# Patient Record
Sex: MALE | Race: WHITE | NOT HISPANIC OR LATINO | Employment: OTHER | URBAN - METROPOLITAN AREA
[De-identification: names, ages, dates, MRNs, and addresses within clinical notes are randomized per-mention and may not be internally consistent; named-entity substitution may affect disease eponyms.]

---

## 2017-06-15 ENCOUNTER — TRANSFERRED RECORDS (OUTPATIENT)
Dept: FAMILY MEDICINE | Facility: CLINIC | Age: 67
End: 2017-06-15

## 2017-06-21 ENCOUNTER — TRANSFERRED RECORDS (OUTPATIENT)
Dept: FAMILY MEDICINE | Facility: CLINIC | Age: 67
End: 2017-06-21

## 2017-06-22 ENCOUNTER — OFFICE VISIT (OUTPATIENT)
Dept: FAMILY MEDICINE | Facility: CLINIC | Age: 67
End: 2017-06-22

## 2017-06-22 VITALS
OXYGEN SATURATION: 95 % | WEIGHT: 258.4 LBS | BODY MASS INDEX: 33.18 KG/M2 | DIASTOLIC BLOOD PRESSURE: 82 MMHG | SYSTOLIC BLOOD PRESSURE: 122 MMHG | HEART RATE: 55 BPM | TEMPERATURE: 97.6 F

## 2017-06-22 DIAGNOSIS — Z12.5 SCREENING FOR PROSTATE CANCER: ICD-10-CM

## 2017-06-22 DIAGNOSIS — M10.00 IDIOPATHIC GOUT, UNSPECIFIED CHRONICITY, UNSPECIFIED SITE: ICD-10-CM

## 2017-06-22 DIAGNOSIS — E78.2 MIXED HYPERLIPIDEMIA: ICD-10-CM

## 2017-06-22 DIAGNOSIS — K21.9 GASTROESOPHAGEAL REFLUX DISEASE WITHOUT ESOPHAGITIS: ICD-10-CM

## 2017-06-22 DIAGNOSIS — G60.9 IDIOPATHIC PERIPHERAL NEUROPATHY: ICD-10-CM

## 2017-06-22 DIAGNOSIS — E03.9 ACQUIRED HYPOTHYROIDISM: ICD-10-CM

## 2017-06-22 DIAGNOSIS — N52.9 IMPOTENCE OF ORGANIC ORIGIN: ICD-10-CM

## 2017-06-22 DIAGNOSIS — J30.9 CHRONIC ALLERGIC RHINITIS, UNSPECIFIED SEASONALITY, UNSPECIFIED TRIGGER: ICD-10-CM

## 2017-06-22 DIAGNOSIS — R73.09 ABNORMAL GLUCOSE: ICD-10-CM

## 2017-06-22 PROCEDURE — 36415 COLL VENOUS BLD VENIPUNCTURE: CPT | Performed by: FAMILY MEDICINE

## 2017-06-22 PROCEDURE — 80061 LIPID PANEL: CPT | Mod: 90 | Performed by: FAMILY MEDICINE

## 2017-06-22 PROCEDURE — 82607 VITAMIN B-12: CPT | Mod: 90 | Performed by: FAMILY MEDICINE

## 2017-06-22 PROCEDURE — 82306 VITAMIN D 25 HYDROXY: CPT | Mod: 90 | Performed by: FAMILY MEDICINE

## 2017-06-22 PROCEDURE — 84439 ASSAY OF FREE THYROXINE: CPT | Mod: 90 | Performed by: FAMILY MEDICINE

## 2017-06-22 PROCEDURE — 84550 ASSAY OF BLOOD/URIC ACID: CPT | Mod: 90 | Performed by: FAMILY MEDICINE

## 2017-06-22 PROCEDURE — 99214 OFFICE O/P EST MOD 30 MIN: CPT | Performed by: FAMILY MEDICINE

## 2017-06-22 PROCEDURE — 80048 BASIC METABOLIC PNL TOTAL CA: CPT | Mod: 90 | Performed by: FAMILY MEDICINE

## 2017-06-22 PROCEDURE — 84443 ASSAY THYROID STIM HORMONE: CPT | Mod: 90 | Performed by: FAMILY MEDICINE

## 2017-06-22 PROCEDURE — 84153 ASSAY OF PSA TOTAL: CPT | Mod: 90 | Performed by: FAMILY MEDICINE

## 2017-06-22 RX ORDER — LEVOTHYROXINE SODIUM 88 UG/1
88 TABLET ORAL DAILY
Qty: 90 TABLET | Refills: 3 | Status: CANCELLED | OUTPATIENT
Start: 2017-06-22

## 2017-06-22 NOTE — PATIENT INSTRUCTIONS
Colonoscopy:      [facility] (Dr Villavicencio, Dr Allen)    [facility]

## 2017-06-22 NOTE — MR AVS SNAPSHOT
After Visit Summary   6/22/2017    Rojelio Mosqueda    MRN: [id number]           Patient Information     Date Of Birth          1950        Visit Information        Provider Department      6/22/2017 8:30 AM Viviane Ann MD [facility]        Today's Diagnoses     Idiopathic peripheral neuropathy        Gastroesophageal reflux disease without esophagitis        Acquired hypothyroidism        Idiopathic gout, unspecified chronicity, unspecified site        Chronic allergic rhinitis, unspecified seasonality, unspecified trigger        Impotence of organic origin        Mixed hyperlipidemia        Screening for prostate cancer        Abnormal glucose          Care Instructions    Colonoscopy:      [facility] (Dr Villavicencio, Dr Allen)    [facility]          Follow-ups after your visit        Who to contact     If you have questions or need follow up information about today's clinic visit or your schedule please contact [facility] directly at [phone].  Normal or non-critical lab and imaging results will be communicated to you by [employer], letter or phone within 4 business days after the clinic has received the results. If you do not hear from us within 7 days, please contact the clinic through [employer] or phone. If you have a critical or abnormal lab result, we will notify you by phone as soon as possible.  Submit refill requests through [employer] or call your pharmacy and they will forward the refill request to us. Please allow 3 business days for your refill to be completed.          Additional Information About Your Visit        MyChart Information     [employer] gives you secure access to your electronic health record. If you see a primary care provider, you can also send messages to your care team and make appointments. If you have questions, please call your primary care clinic.  If you do not have a primary care  provider, please call [phone] and they will assist you.        Care EveryWhere ID     This is your Care EveryWhere ID. This could be used by other organizations to access your [facility] medical records  [id number]        Your Vitals Were     Pulse Temperature Pulse Oximetry BMI (Body Mass Index)          55 97.6  F (36.4  C) (Oral) 95% 33.18 kg/m2         Blood Pressure from Last 3 Encounters:   06/22/17 122/82   09/09/16 128/82   07/06/16 112/74    Weight from Last 3 Encounters:   06/22/17 117.2 kg (258 lb 6.4 oz)   09/09/16 116.7 kg (257 lb 3.2 oz)   07/06/16 116.9 kg (257 lb 12.8 oz)              We Performed the Following     BASIC METABOLIC PANEL ([facility])     HCL PSA, SCREENING ([facility])     Lipid Profile     T4 free ([facility])     TSH ([facility])     URIC ACID ([facility])     VENOUS COLLECTION     VITAMIN B12 ([facility])     Vitamin D Deficiency          Today's Medication Changes          These changes are accurate as of: 6/22/17 11:59 PM.  If you have any questions, ask your nurse or doctor.               These medicines have changed or have updated prescriptions.        Dose/Directions    * levothyroxine 88 MCG tablet   Commonly known as:  SYNTHROID/LEVOTHROID   This may have changed:  Another medication with the same name was added. Make sure you understand how and when to take each.   Used for:  Acquired hypothyroidism   Changed by:  Viviane Ann MD        Dose:  88 mcg   Take 1 tablet (88 mcg) by mouth daily   Quantity:  90 tablet   Refills:  3       * levothyroxine 100 MCG tablet   Commonly known as:  SYNTHROID/LEVOTHROID   This may have changed:  You were already taking a medication with the same name, and this prescription was added. Make sure you understand how and when to take each.   Used for:  Acquired hypothyroidism   Changed by:  Viviane Ann MD        Dose:  100 mcg   Take 1 tablet (100 mcg) by mouth daily   Quantity:  90 tablet   Refills:  1       * Notice:  This list has 2 medication(s)  that are the same as other medications prescribed for you. Read the directions carefully, and ask your doctor or other care provider to review them with you.         Where to get your medicines      These medications were sent to [facility]/pharmacy #[id number] - [place], [place] - [address]  [address], [facility] [address]     Phone:  [phone]     allopurinol 100 MG tablet    levothyroxine 100 MCG tablet    mometasone 50 MCG/ACT spray    omeprazole 20 MG CR capsule    tadalafil 20 MG tablet         Some of these will need a paper prescription and others can be bought over the counter.  Ask your nurse if you have questions.     Bring a paper prescription for each of these medications     pregabalin 75 MG capsule                Primary Care Provider Office Phone # Fax #    Viviane Ann [phone]       [facility] [address]   [facility] [address]        Equal Access to Services     VASHTI PRAKASH : Hadii emily bhandari hadasho Soomaali, waaxda luqadaha, qaybta kaalmada adeegyada, waxay olesyain alberto ramos . So [facility] [phone].    ATENCIÓN: Si habla español, tiene a merida disposición servicios gratuitos de asistencia lingüística. Erika al [phone].    We comply with applicable federal civil rights laws and [place] laws. We do not discriminate on the basis of race, color, national origin, age, disability sex, sexual orientation or gender identity.            Thank you!     Thank you for choosing [facility]  for your care. Our goal is always to provide you with excellent care. Hearing back from our patients is one way we can continue to improve our services. Please take a few minutes to complete the written survey that you may receive in the mail after your visit with us. Thank you!             Your Updated Medication List - Protect others around you: Learn how to safely use, store and throw away your medicines at  www.disposemymeds.org.          This list is accurate as of: 6/22/17 11:59 PM.  Always use your most recent med list.                   Brand Name Dispense Instructions for use Diagnosis    allopurinol 100 MG tablet    ZYLOPRIM    90 tablet    Take 3 tablets (300 mg) by mouth daily    Idiopathic gout, unspecified chronicity, unspecified site       cholecalciferol 43257 UNITS capsule    VITAMIN D3    12 capsule    Take 1 capsule (50,000 Units) by mouth once a week    Vitamin D deficiency       DAILY MULTIVITAMIN PO      1 daily        GLUCOSAMINE CHONDROITIN COMPLX PO      1 daily        * levothyroxine 88 MCG tablet    SYNTHROID/LEVOTHROID    90 tablet    Take 1 tablet (88 mcg) by mouth daily    Acquired hypothyroidism       * levothyroxine 100 MCG tablet    SYNTHROID/LEVOTHROID    90 tablet    Take 1 tablet (100 mcg) by mouth daily    Acquired hypothyroidism       * LYRICA PO      Take 75 mg by mouth daily    Idiopathic peripheral neuropathy       * pregabalin 75 MG capsule    LYRICA    270 capsule    Take 1 capsule (75 mg) by mouth 3 times daily    Idiopathic peripheral neuropathy       MAGNESIUM OXIDE PO       Malaise       mometasone 50 MCG/ACT spray    NASONEX    3 Box    Spray 2 sprays into both nostrils daily    Chronic allergic rhinitis, unspecified seasonality, unspecified trigger       OMEGA 3 PO      1 daily        omeprazole 20 MG CR capsule    priLOSEC    90 capsule    Take 1 capsule (20 mg) by mouth daily    Gastroesophageal reflux disease without esophagitis       tadalafil 20 MG tablet    CIALIS    20 tablet    Take 1 tablet (20 mg) by mouth daily as needed    Impotence of organic origin       VITAMIN B 12 PO       Malaise       VITAMIN B-1 PO       Malaise       * Notice:  This list has 4 medication(s) that are the same as other medications prescribed for you. Read the directions carefully, and ask your doctor or other care provider to review them with you.

## 2017-06-22 NOTE — PROGRESS NOTES
"  SUBJECTIVE:                                                    Rojelio Mosqueda is a 66 year old male who presents to clinic today for the following health issues:    Hypothyroidism Follow-up      Since last visit, patient describes the following symptoms: Weight stable, no hair loss, no skin changes, no constipation, no loose stools   Complains of sweating- better than last year- ongoing problem    Amount of exercise or physical activity: regular activity- hiking, biking    Problems taking medications regularly: No    Medication side effects: none      PROBLEMS TO ADD ON...    Infected olecranon bursa this winter- required drainage- symptoms resolved    Gout: refill allopurinol  No gout flairs this past year.    Idiopathic neuropathy: refill lyrica- manages- symptoms  Recheck vitamin B12  Sees a podiatrist that does  neurogenics- electrodes on legs- - has done two sessions  Night symptoms are worse / feels like a cramping in the toes  Discussed follow up visit with his neurologist    GERD: takes omeprazole 20 mg daily (has been on for years)  No heartburn symptoms- feels \"flem\" in his throat- hiatal hernia- long term treatment advised by GI    taking 10,000 IU vitamin D today- excessive dose: cautioned about fat storage-     Chronic rhinitis- symptoms controlled with nasal steroids    Requests refill of cialis      Problem list and histories reviewed & adjusted, as indicated.  Additional history: as documented    Patient Active Problem List   Diagnosis     ESOPHAGEAL REFLUX/ hiatal hernia     Family history of diabetes mellitus     Depressive disorder, not elsewhere classified     Impotence of organic origin     Neuropathy (H)     Peripheral neuropathy (H)     Elevated blood uric acid level     Hypothyroidism     Health Care Home     ACP (advance care planning)     Past Surgical History:   Procedure Laterality Date     C NONSPECIFIC PROCEDURE  2/2011    upper endos/ mod hiatal hernia/ antireflux tx     " COLONOSCOPY  2007    [place] [facility]     HERNIA REPAIR       lt. foot-surgery       NO HISTORY OF SURGERY       ORTHOPEDIC SURGERY         Social History   Substance Use Topics     Smoking status: Former Smoker     Packs/day: 1.00     Years: 15.00     Types: Cigarettes     Quit date: 1/1/2004     Smokeless tobacco: Never Used      Comment: Smoked off & on     Alcohol use 1.5 oz/week     3 drink(s) per week      Comment: 2-3 days per week 1-2 drinks per time     Family History   Problem Relation Age of Onset     CANCER No family hx of      DIABETES Mother      HEART DISEASE No family hx of          Current Outpatient Prescriptions   Medication Sig Dispense Refill     cholecalciferol (VITAMIN D3) 84103 UNITS capsule Take 1 capsule (50,000 Units) by mouth once a week 12 capsule 0     Cyanocobalamin (VITAMIN B 12 PO)        Thiamine HCl (VITAMIN B-1 PO)        MAGNESIUM OXIDE PO        pregabalin (LYRICA) 75 MG capsule Take 1 capsule (75 mg) by mouth 3 times daily 270 capsule 3     omeprazole (PRILOSEC) 20 MG capsule Take 1 capsule (20 mg) by mouth daily 90 capsule 3     levothyroxine (SYNTHROID, LEVOTHROID) 88 MCG tablet Take 1 tablet (88 mcg) by mouth daily 90 tablet 3     allopurinol (ZYLOPRIM) 100 MG tablet Take 3 tablets (300 mg) by mouth daily 90 tablet 3     mometasone (NASONEX) 50 MCG/ACT nasal spray Spray 2 sprays into both nostrils daily 3 Box 3     tadalafil (CIALIS) 20 MG tablet Take 1 tablet (20 mg) by mouth daily as needed 20 tablet prn     Pregabalin (LYRICA PO) Take 75 mg by mouth daily       GLUCOSAMINE CHONDROITIN COMPLX PO 1 daily       DAILY MULTIVITAMIN OR 1 daily       OMEGA 3 OR 1 daily         Reviewed and updated as needed this visit by clinical staff       Reviewed and updated as needed this visit by Provider         ROS:  Constitutional, HEENT, cardiovascular, pulmonary, gi and gu systems are negative, except as otherwise noted.    OBJECTIVE:     /82 (BP Location: Left arm, Patient  "Position: Chair, Cuff Size: Adult Large)  Pulse 55  Temp 97.6  F (36.4  C) (Oral)  Wt 117.2 kg (258 lb 6.4 oz)  SpO2 95%  BMI 33.18 kg/m2  Body mass index is 33.18 kg/(m^2).   Regular rate and  rhythm. S1 and S2 normal, no murmurs, clicks, gallops or rubs. No edema or JVD. Chest is clear; no wheezes or rales.  No redness, warmth of joints- right olecranon is normal.  Thyroid without palpable masses or enlargement    Diagnostic Test Results:per epic       ASSESSMENT/PLAN:     Problem List Items Addressed This Visit     ESOPHAGEAL REFLUX/ hiatal hernia    Hypothyroidism    Relevant Orders    TSH ([facility]) (Completed)    T4 free ([facility]) (Completed)    VENOUS COLLECTION (Completed)    Impotence of organic origin    Peripheral neuropathy (H)    Relevant Orders    VITAMIN B12 ([facility]) (Completed)    VENOUS COLLECTION (Completed)    Vitamin D Deficiency (Completed)      Other Visit Diagnoses     Screening for prostate cancer    -  Primary    Relevant Orders    HCL PSA, SCREENING ([facility]) (Completed)    Idiopathic gout, unspecified chronicity, unspecified site        Relevant Orders    URIC ACID ([facility]) (Completed)    VENOUS COLLECTION (Completed)    BASIC METABOLIC PANEL ([facility]) (Completed)    Allergic rhinitis due to pollen, unspecified rhinitis seasonality        Mixed hyperlipidemia        Relevant Orders    Lipid Profile (Completed)           BMI:   Estimated body mass index is 33.18 kg/(m^2) as calculated from the following:    Height as of 9/9/16: 1.88 m (6' 2\").    Weight as of this encounter: 117.2 kg (258 lb 6.4 oz).   Weight management plan: Discussed healthy diet and exercise guidelines and patient will follow up in 3 months in clinic to re-evaluate.      MEDICATIONS:        - Increase Levothyroxine to 100 mcg       - Decrease Vitamin D to 1000 IU       - Continue other medications without change  CONSULTATION/REFERRAL to neurology  FUTURE APPOINTMENTS:       - Follow-up visit in 3 months- recheck thyroid and " lipid profile  Work on weight loss  Regular exercise    Viviane Ann MD  [facility]

## 2017-06-22 NOTE — NURSING NOTE
Rojelio is here for a fasting medication recheck.     Pre-Visit Screening :  Immunizations : up to date  Colon Screening : Postponed- Pt is not sure his insurance pays for this- He will be calling them.   Asthma Action Test/Plan : DELFINO  PHQ9/GAD7 :  DELFINO    Pulse - regular  My Chart - accepts    CLASSIFICATION OF OVERWEIGHT AND OBESITY BY BMI                         Obesity Class           BMI(kg/m2)  Underweight                                    < 18.5  Normal                                         18.5-24.9  Overweight                                     25.0-29.9  OBESITY                     I                  30.0-34.9                              II                 35.0-39.9  EXTREME OBESITY             III                >40                             Patient's  BMI Body mass index is 33.18 kg/(m^2).  http://hin.nhlbi.nih.gov/menuplanner/menu.cgi  Questioned patient about current smoking habits.  Pt. Quit smoking sometime ago.     Tomeka.YVAN Palacios ([facility])

## 2017-06-23 LAB
ABBOTT PSA - QUEST: 0.9 NG/ML
BUN SERPL-MCNC: 20 MG/DL (ref 7–25)
BUN/CREATININE RATIO: ABNORMAL (CALC) (ref 6–22)
CALCIUM SERPL-MCNC: 9.9 MG/DL (ref 8.6–10.3)
CHLORIDE SERPLBLD-SCNC: 103 MMOL/L (ref 98–110)
CHOLEST SERPL-MCNC: 210 MG/DL (ref 125–200)
CHOLEST/HDLC SERPL: 6.2 (CALC)
CO2 SERPL-SCNC: 28 MMOL/L (ref 20–31)
CREAT SERPL-MCNC: 0.98 MG/DL (ref 0.7–1.25)
EGFR AFRICAN AMERICAN - QUEST: 94 ML/MIN/1.73M2
GFR SERPL CREATININE-BSD FRML MDRD: 81 ML/MIN/1.73M2
GLUCOSE - QUEST: 103 MG/DL (ref 65–99)
HDLC SERPL-MCNC: 34 MG/DL
LDLC SERPL CALC-MCNC: 134 MG/DL (CALC)
NONHDLC SERPL-MCNC: 176 MG/DL (CALC)
POTASSIUM SERPL-SCNC: 4.9 MMOL/L (ref 3.5–5.3)
SODIUM SERPL-SCNC: 137 MMOL/L (ref 135–146)
T4, FREE, NON-DIALYSIS - QUEST: 1 NG/DL (ref 0.8–1.8)
TRIGL SERPL-MCNC: 211 MG/DL
TSH SERPL-ACNC: 4.14 MIU/L (ref 0.4–4.5)
URATE SERPL-MCNC: 6.8 MG/DL (ref 4–8)
VIT B12 SERPL-MCNC: 608 PG/ML (ref 200–1100)
VITAMIN D, 25-OH, TOTAL - QUEST: 50 NG/ML (ref 30–100)

## 2017-06-26 PROBLEM — R73.09 ABNORMAL GLUCOSE: Status: ACTIVE | Noted: 2017-06-26

## 2017-06-26 RX ORDER — TADALAFIL 20 MG/1
20 TABLET ORAL DAILY PRN
Qty: 20 TABLET | Status: SHIPPED | OUTPATIENT
Start: 2017-06-26 | End: 2020-08-17

## 2017-06-26 RX ORDER — ALLOPURINOL 100 MG/1
300 TABLET ORAL DAILY
Qty: 90 TABLET | Refills: 3 | Status: SHIPPED | OUTPATIENT
Start: 2017-06-26 | End: 2018-07-05

## 2017-06-26 RX ORDER — PREGABALIN 75 MG/1
75 CAPSULE ORAL 3 TIMES DAILY
Qty: 270 CAPSULE | Refills: 3 | Status: SHIPPED | OUTPATIENT
Start: 2017-06-26 | End: 2017-10-18

## 2017-06-26 RX ORDER — LEVOTHYROXINE SODIUM 100 UG/1
100 TABLET ORAL DAILY
Qty: 90 TABLET | Refills: 1 | Status: SHIPPED | OUTPATIENT
Start: 2017-06-26 | End: 2017-10-18

## 2017-06-26 RX ORDER — MOMETASONE FUROATE MONOHYDRATE 50 UG/1
2 SPRAY, METERED NASAL DAILY
Qty: 3 BOX | Refills: 3 | Status: SHIPPED | OUTPATIENT
Start: 2017-06-26 | End: 2020-08-17

## 2017-07-17 ENCOUNTER — TRANSFERRED RECORDS (OUTPATIENT)
Dept: FAMILY MEDICINE | Facility: CLINIC | Age: 67
End: 2017-07-17

## 2017-09-07 ENCOUNTER — MYC MEDICAL ADVICE (OUTPATIENT)
Dept: FAMILY MEDICINE | Facility: CLINIC | Age: 67
End: 2017-09-07

## 2017-09-07 NOTE — TELEPHONE ENCOUNTER
From: Rojelio Mosqueda  To: Viviane Ann MD  Sent: 9/7/2017 9:38 AM CDT  Subject: Do I need an appointment?    Doctor Marissa,  I have been having pain in my elbow area now for 6 months, I guess this is called tennis elbow.  I have been using some ice. And use an elbow band in the area when I do any activity.  Is there anything else I can do? Is any type of lazier useful.  Thanks

## 2017-09-08 ENCOUNTER — MYC MEDICAL ADVICE (OUTPATIENT)
Dept: FAMILY MEDICINE | Facility: CLINIC | Age: 67
End: 2017-09-08

## 2017-09-08 NOTE — TELEPHONE ENCOUNTER
From: Rojelio Mosqueda  To: Viviane Ann MD  Sent: 9/8/2017 7:21 AM CDT  Subject: Do I need an appointment?    Doctor Marissa,  I did speak to my ortho doctor when I was getting my knee injections last June. He indicated he did not  Like to give the injections in the arm because of possible problems, I can't remember exactly what they were.  I was wondering if some kind of lazer therapy helps.  I tried to reply to your last message but could not so I started this one  Thanks for the response.  Miguel

## 2017-10-08 ENCOUNTER — MYC MEDICAL ADVICE (OUTPATIENT)
Dept: FAMILY MEDICINE | Facility: CLINIC | Age: 67
End: 2017-10-08

## 2017-10-08 DIAGNOSIS — G60.9 IDIOPATHIC PERIPHERAL NEUROPATHY: ICD-10-CM

## 2017-10-09 RX ORDER — PREGABALIN 100 MG/1
100 CAPSULE ORAL 3 TIMES DAILY
Qty: 270 CAPSULE | Refills: 2 | Status: SHIPPED | OUTPATIENT
Start: 2017-10-09 | End: 2018-06-19

## 2017-10-09 RX ORDER — PREGABALIN 75 MG/1
75 CAPSULE ORAL 3 TIMES DAILY
Qty: 270 CAPSULE | Status: CANCELLED | OUTPATIENT
Start: 2017-10-09

## 2017-10-09 NOTE — TELEPHONE ENCOUNTER
From: Rojelio Mosqueda  To: Viviane Ann MD  Sent: 10/8/2017 7:34 PM CDT  Subject: Question about medications    Doctor Marissa,  I have been needing at least four tablets of the Lyrica instead of the three that are now in my prescription for my neourophathy. I am not about 13 days short. I can not fill it unless the amount is changed. Could you please call into [facility] in [place] a refill with the four pills a day.  Thanks

## 2017-10-18 ENCOUNTER — OFFICE VISIT (OUTPATIENT)
Dept: FAMILY MEDICINE | Facility: CLINIC | Age: 67
End: 2017-10-18

## 2017-10-18 VITALS
TEMPERATURE: 97.9 F | OXYGEN SATURATION: 96 % | HEART RATE: 57 BPM | BODY MASS INDEX: 33.56 KG/M2 | SYSTOLIC BLOOD PRESSURE: 104 MMHG | DIASTOLIC BLOOD PRESSURE: 70 MMHG | WEIGHT: 261.4 LBS

## 2017-10-18 DIAGNOSIS — N52.9 IMPOTENCE OF ORGANIC ORIGIN: ICD-10-CM

## 2017-10-18 DIAGNOSIS — E79.0 ELEVATED BLOOD URIC ACID LEVEL: ICD-10-CM

## 2017-10-18 DIAGNOSIS — E03.9 ACQUIRED HYPOTHYROIDISM: Primary | ICD-10-CM

## 2017-10-18 PROCEDURE — 36415 COLL VENOUS BLD VENIPUNCTURE: CPT | Performed by: FAMILY MEDICINE

## 2017-10-18 PROCEDURE — 99213 OFFICE O/P EST LOW 20 MIN: CPT | Performed by: FAMILY MEDICINE

## 2017-10-18 PROCEDURE — 84550 ASSAY OF BLOOD/URIC ACID: CPT | Mod: 90 | Performed by: FAMILY MEDICINE

## 2017-10-18 PROCEDURE — 84443 ASSAY THYROID STIM HORMONE: CPT | Mod: 90 | Performed by: FAMILY MEDICINE

## 2017-10-18 PROCEDURE — 84439 ASSAY OF FREE THYROXINE: CPT | Mod: 90 | Performed by: FAMILY MEDICINE

## 2017-10-18 RX ORDER — TADALAFIL 20 MG/1
20 TABLET ORAL DAILY PRN
Qty: 20 TABLET | Status: CANCELLED | OUTPATIENT
Start: 2017-10-18

## 2017-10-18 NOTE — PROGRESS NOTES
SUBJECTIVE:  Rojelio is a 66 year old male here for thyroid check and refills.    Dose was increased to 100 mcg three months ago for TSH above 4- repeat labs.        He denies symptoms such as:  weight changes, appetite changes, sleep alterations, cold/hot intolerances, vision changes, heart symptoms, mental changes, or swelling in legs.  Driving to [place] in about two weeks    Past medical history, family history, medications and social history reviewed today and updated in EPIC.      Exam:    /70 (BP Location: Right arm, Patient Position: Chair, Cuff Size: Adult Large)  Pulse 57  Temp 97.9  F (36.6  C) (Oral)  Wt 118.6 kg (261 lb 6.4 oz)  SpO2 96%  BMI 33.56 kg/m2  General appearance: healthy, alert and no distress  The neck is supple and free of adenopathy or masses, the   thyroid is normal without enlargement or nodules.    Assessment: monitor thyroid function  Discussion of elevated fasting blood sugar- importance of diet and exercise  Taking allopurinol more consistently, wishes recheck of uric acid on 200 mg daily dose    PLAN:  Lab is pending.   Continue current medications if still in good range, and recheck levels in one year.     Takes thyroid with all of his vitamins in the morning- discussion of dosng on empty stomach without other vitamins       More than 50% of visit spent in counseling.

## 2017-10-18 NOTE — NURSING NOTE
Rojelio is here for a fasting medication recheck.     Pre-Visit Screening :  Immunizations : up to date    Colonoscopy : is up to date  Mammogram : NA  Asthma Action Test/Plan : NA  PHQ9/GAD7 :  Pt states he does not have any    Pulse - regular  My Chart - accepts    CLASSIFICATION OF OVERWEIGHT AND OBESITY BY BMI                         Obesity Class           BMI(kg/m2)  Underweight                                    < 18.5  Normal                                         18.5-24.9  Overweight                                     25.0-29.9  OBESITY                     I                  30.0-34.9                              II                 35.0-39.9  EXTREME OBESITY             III                >40                             Patient's  BMI Body mass index is 33.56 kg/(m^2).  http://hin.nhlbi.nih.gov/menuplanner/menu.cgi  Questioned patient about current smoking habits.  Pt.quit sometime ago    Tomeka.YVAN Palacios ([facility])

## 2017-10-18 NOTE — MR AVS SNAPSHOT
After Visit Summary   10/18/2017    Rojelio Mosqueda    MRN: [id number]           Patient Information     Date Of Birth          1950        Visit Information        Provider Department      10/18/2017 7:30 AM Viviane Ann MD [facility]        Today's Diagnoses     Acquired hypothyroidism    -  1    Impotence of organic origin        Elevated blood uric acid level           Follow-ups after your visit        Who to contact     If you have questions or need follow up information about today's clinic visit or your schedule please contact [facility] directly at [phone].  Normal or non-critical lab and imaging results will be communicated to you by Motion Dispatchhart, letter or phone within 4 business days after the clinic has received the results. If you do not hear from us within 7 days, please contact the clinic through Motion Dispatchhart or phone. If you have a critical or abnormal lab result, we will notify you by phone as soon as possible.  Submit refill requests through Smartio or call your pharmacy and they will forward the refill request to us. Please allow 3 business days for your refill to be completed.          Additional Information About Your Visit        MyChart Information     Smartio gives you secure access to your electronic health record. If you see a primary care provider, you can also send messages to your care team and make appointments. If you have questions, please call your primary care clinic.  If you do not have a primary care provider, please call [phone] and they will assist you.        Care EveryWhere ID     This is your Care EveryWhere ID. This could be used by other organizations to access your [facility] medical records  [id number]        Your Vitals Were     Pulse Temperature Pulse Oximetry BMI (Body Mass Index)          57 97.9  F (36.6  C) (Oral) 96% 33.56 kg/m2         Blood Pressure from Last 3 Encounters:   10/18/17  104/70   06/22/17 122/82   09/09/16 128/82    Weight from Last 3 Encounters:   10/18/17 118.6 kg (261 lb 6.4 oz)   06/22/17 117.2 kg (258 lb 6.4 oz)   09/09/16 116.7 kg (257 lb 3.2 oz)              We Performed the Following     T4 free     TSH (QUEST)     URIC ACID (QUEST)     VENOUS COLLECTION          Today's Medication Changes          These changes are accurate as of: 10/18/17 11:59 PM.  If you have any questions, ask your nurse or doctor.               These medicines have changed or have updated prescriptions.        Dose/Directions    levothyroxine 100 MCG tablet   Commonly known as:  SYNTHROID/LEVOTHROID   This may have changed:  Another medication with the same name was removed. Continue taking this medication, and follow the directions you see here.   Used for:  Acquired hypothyroidism   Changed by:  Viviane Ann MD        Dose:  100 mcg   Take 1 tablet (100 mcg) by mouth daily   Quantity:  90 tablet   Refills:  1            Where to get your medicines      These medications were sent to [facility]/pharmacy #[id number] - [place], [place] - [address]  [address] [facility] [address]     Phone:  [phone]     levothyroxine 100 MCG tablet                Primary Care Provider Office Phone # Fax #    Viviane Ann -[phone] [address] [place] [address]        Equal Access to Services     [facility]: Hadii emily bhandari hadasho Sofarhana, waaxda luqadaha, qaybta kaalmada adeashutoshyada, lyly ramos . So [facility] [phone].    ATENCIÓN: Si habla español, tiene a merida disposición servicios gratuitos de asistencia lingüística. Llame al [phone].    We comply with applicable federal civil rights laws and [place] laws. We do not discriminate on the basis of race, color, national origin, age, disability, sex, sexual orientation, or gender identity.            Thank you!     Thank you for choosing [facility]   for your care. Our goal is always to provide you with excellent care. Hearing back from our patients is one way we can continue to improve our services. Please take a few minutes to complete the written survey that you may receive in the mail after your visit with us. Thank you!             Your Updated Medication List - Protect others around you: Learn how to safely use, store and throw away your medicines at www.disposemymeds.org.          This list is accurate as of: 10/18/17 11:59 PM.  Always use your most recent med list.                   Brand Name Dispense Instructions for use Diagnosis    allopurinol 100 MG tablet    ZYLOPRIM    90 tablet    Take 3 tablets (300 mg) by mouth daily    Idiopathic gout, unspecified chronicity, unspecified site       cholecalciferol 52072 UNITS capsule    VITAMIN D3    12 capsule    Take 1 capsule (50,000 Units) by mouth once a week    Vitamin D deficiency       DAILY MULTIVITAMIN PO      1 daily        levothyroxine 100 MCG tablet    SYNTHROID/LEVOTHROID    90 tablet    Take 1 tablet (100 mcg) by mouth daily    Acquired hypothyroidism       MAGNESIUM OXIDE PO       Malaise       mometasone 50 MCG/ACT spray    NASONEX    3 Box    Spray 2 sprays into both nostrils daily    Chronic allergic rhinitis, unspecified seasonality, unspecified trigger       omeprazole 20 MG CR capsule    priLOSEC    90 capsule    Take 1 capsule (20 mg) by mouth daily    Gastroesophageal reflux disease without esophagitis       pregabalin 100 MG capsule    LYRICA    270 capsule    Take 1 capsule (100 mg) by mouth 3 times daily    Idiopathic peripheral neuropathy       tadalafil 20 MG tablet    CIALIS    20 tablet    Take 1 tablet (20 mg) by mouth daily as needed    Impotence of organic origin       VITAMIN B 12 PO       Malaise       VITAMIN B-1 PO       Malaise

## 2017-10-19 LAB
T4, FREE, NON-DIALYSIS - QUEST: 1 NG/DL (ref 0.8–1.8)
TSH SERPL-ACNC: 3.97 MIU/L (ref 0.4–4.5)
URATE SERPL-MCNC: 6.3 MG/DL (ref 4–8)

## 2017-10-20 RX ORDER — LEVOTHYROXINE SODIUM 100 UG/1
100 TABLET ORAL DAILY
Qty: 90 TABLET | Refills: 1 | Status: SHIPPED | OUTPATIENT
Start: 2017-10-20 | End: 2018-06-17

## 2018-01-03 ENCOUNTER — TRANSFERRED RECORDS (OUTPATIENT)
Dept: FAMILY MEDICINE | Facility: CLINIC | Age: 68
End: 2018-01-03

## 2018-06-17 DIAGNOSIS — E03.9 ACQUIRED HYPOTHYROIDISM: ICD-10-CM

## 2018-06-18 ENCOUNTER — MYC REFILL (OUTPATIENT)
Dept: FAMILY MEDICINE | Facility: CLINIC | Age: 68
End: 2018-06-18

## 2018-06-18 DIAGNOSIS — G60.9 IDIOPATHIC PERIPHERAL NEUROPATHY: ICD-10-CM

## 2018-06-18 RX ORDER — LEVOTHYROXINE SODIUM 100 UG/1
TABLET ORAL
Qty: 90 TABLET | Refills: 1 | Status: SHIPPED | OUTPATIENT
Start: 2018-06-18 | End: 2018-11-12

## 2018-06-18 RX ORDER — PREGABALIN 100 MG/1
100 CAPSULE ORAL 3 TIMES DAILY
Qty: 270 CAPSULE | Refills: 2 | Status: CANCELLED | OUTPATIENT
Start: 2018-06-18

## 2018-06-18 NOTE — TELEPHONE ENCOUNTER
Rojelio Mosqueda is requesting a refill of:    Pending Prescriptions:                       Disp   Refills    levothyroxine (SYNTHROID/LEVOTHROID) 100 *90 tab*0            Sig: TAKE 1 TABLET (100 MCG) BY MOUTH DAILY    TSH   Date Value Ref Range Status   10/18/2017 3.97 0.40 - 4.50 mIU/L Final

## 2018-06-19 DIAGNOSIS — G60.9 IDIOPATHIC PERIPHERAL NEUROPATHY: ICD-10-CM

## 2018-06-19 RX ORDER — PREGABALIN 100 MG
CAPSULE ORAL
Qty: 270 CAPSULE | Refills: 0 | Status: ON HOLD | OUTPATIENT
Start: 2018-06-19 | End: 2018-10-08

## 2018-06-19 NOTE — TELEPHONE ENCOUNTER
Pending Prescriptions:                       Disp   Refills    LYRICA 100 MG capsule [Pharmacy Med Name:*270 ca*2            Sig: TAKE 1 CAPSULE BY MOUTH 3 TIMES DAILY    Per my chart notes, from yesterday   You wanted to see pt, he said he would make an appt  No future appt as of now  Fax or deny  Lisa  [phone] (home)

## 2018-06-22 ENCOUNTER — OFFICE VISIT (OUTPATIENT)
Dept: FAMILY MEDICINE | Facility: CLINIC | Age: 68
End: 2018-06-22

## 2018-06-22 VITALS
WEIGHT: 258 LBS | TEMPERATURE: 98.2 F | BODY MASS INDEX: 33.13 KG/M2 | HEART RATE: 56 BPM | SYSTOLIC BLOOD PRESSURE: 112 MMHG | OXYGEN SATURATION: 95 % | DIASTOLIC BLOOD PRESSURE: 78 MMHG

## 2018-06-22 DIAGNOSIS — Z79.899 MEDICATION MANAGEMENT: ICD-10-CM

## 2018-06-22 DIAGNOSIS — G62.9 NEUROPATHY: Primary | ICD-10-CM

## 2018-06-22 DIAGNOSIS — E78.2 MIXED HYPERLIPIDEMIA: ICD-10-CM

## 2018-06-22 PROCEDURE — 99213 OFFICE O/P EST LOW 20 MIN: CPT | Performed by: FAMILY MEDICINE

## 2018-06-22 PROCEDURE — 36415 COLL VENOUS BLD VENIPUNCTURE: CPT | Performed by: FAMILY MEDICINE

## 2018-06-22 RX ORDER — PREGABALIN 150 MG/1
300 CAPSULE ORAL 2 TIMES DAILY
Qty: 360 CAPSULE | Refills: 3 | Status: SHIPPED | OUTPATIENT
Start: 2018-06-22 | End: 2019-06-21

## 2018-06-22 NOTE — NURSING NOTE
Miguel is here for medication check today.    Pre-visit Screening:  Immunizations:  not up to date - due for pneumo  Colonoscopy:  is up to date  Mammogram: is up to date  Asthma Action Test/Plan:  No concerns  PHQ9:  NA  GAD7:  NA  Questioned patient about current smoking habits Pt. quit smoking some time ago.  Ok to leave detailed message on voice mail for today's visit only Yes, phone # [phone]

## 2018-06-22 NOTE — PATIENT INSTRUCTIONS
New Recombinant shingles vaccine (Shingrix): call your insurance for information on cost    Recheck thyroid and other labs in October

## 2018-06-22 NOTE — MR AVS SNAPSHOT
After Visit Summary   6/22/2018    Rojelio Mosqueda    MRN: [id number]           Patient Information     Date Of Birth          1950        Visit Information        Provider Department      6/22/2018 7:20 AM Viviane Ann MD [facility]        Today's Diagnoses     Neuropathy    -  1    Medication management        Mixed hyperlipidemia          Care Instructions    New Recombinant shingles vaccine (Shingrix): call your insurance for information on cost    Recheck thyroid and other labs in October            Follow-ups after your visit        Who to contact     If you have questions or need follow up information about today's clinic visit or your schedule please contact [facility] directly at [phone].  Normal or non-critical lab and imaging results will be communicated to you by 60mohart, letter or phone within 4 business days after the clinic has received the results. If you do not hear from us within 7 days, please contact the clinic through 60mohart or phone. If you have a critical or abnormal lab result, we will notify you by phone as soon as possible.  Submit refill requests through [employer] or call your pharmacy and they will forward the refill request to us. Please allow 3 business days for your refill to be completed.          Additional Information About Your Visit        MyChart Information     [employer] gives you secure access to your electronic health record. If you see a primary care provider, you can also send messages to your care team and make appointments. If you have questions, please call your primary care clinic.  If you do not have a primary care provider, please call [phone] and they will assist you.        Care EveryWhere ID     This is your Care EveryWhere ID. This could be used by other organizations to access your [facility] medical records  [id number]        Your Vitals Were     Pulse Temperature Pulse Oximetry  BMI (Body Mass Index)          56 98.2  F (36.8  C) (Oral) 95% 33.13 kg/m2         Blood Pressure from Last 3 Encounters:   06/22/18 112/78   10/18/17 104/70   06/22/17 122/82    Weight from Last 3 Encounters:   06/22/18 117 kg (258 lb)   10/18/17 118.6 kg (261 lb 6.4 oz)   06/22/17 117.2 kg (258 lb 6.4 oz)              We Performed the Following     BASIC METABOLIC PANEL (QUEST)     Lipid Profile     VENOUS COLLECTION     Vitamin D Deficiency          Today's Medication Changes          These changes are accurate as of 6/22/18  8:05 AM.  If you have any questions, ask your nurse or doctor.               These medicines have changed or have updated prescriptions.        Dose/Directions    * LYRICA 100 MG capsule   This may have changed:  Another medication with the same name was added. Make sure you understand how and when to take each.   Used for:  Idiopathic peripheral neuropathy   Generic drug:  pregabalin   Changed by:  Viviane Ann MD        TAKE 1 CAPSULE BY MOUTH 3 TIMES DAILY   Quantity:  270 capsule   Refills:  0       * pregabalin 150 MG capsule   Commonly known as:  LYRICA   This may have changed:  You were already taking a medication with the same name, and this prescription was added. Make sure you understand how and when to take each.   Used for:  Neuropathy   Changed by:  Viviane Ann MD        Dose:  300 mg   Take 2 capsules (300 mg) by mouth 2 times daily   Quantity:  360 capsule   Refills:  3       * Notice:  This list has 2 medication(s) that are the same as other medications prescribed for you. Read the directions carefully, and ask your doctor or other care provider to review them with you.         Where to get your medicines      Some of these will need a paper prescription and others can be bought over the counter.  Ask your nurse if you have questions.     Bring a paper prescription for each of these medications     pregabalin 150 MG capsule                Primary Care Provider  Office Phone # Fax #    Viviane Ann [phone] [address] [address]        Equal Access to Services     [facility] : Hadii emily bhandari hadlawrenceo Soomaali, waaxda luqadaha, qaybta kaalmada adesujit, lyly donnellypraful mejia. So [facility] [phone].    ATENCIÓN: Si habla español, tiene a merida disposición servicios gratuitos de asistencia lingüística. Llame al [phone].    We comply with applicable federal civil rights laws and [place] laws. We do not discriminate on the basis of race, color, national origin, age, disability, sex, sexual orientation, or gender identity.            Thank you!     Thank you for choosing [facility]  for your care. Our goal is always to provide you with excellent care. Hearing back from our patients is one way we can continue to improve our services. Please take a few minutes to complete the written survey that you may receive in the mail after your visit with us. Thank you!             Your Updated Medication List - Protect others around you: Learn how to safely use, store and throw away your medicines at www.disposemymeds.org.          This list is accurate as of 6/22/18  8:05 AM.  Always use your most recent med list.                   Brand Name Dispense Instructions for use Diagnosis    allopurinol 100 MG tablet    ZYLOPRIM    90 tablet    Take 3 tablets (300 mg) by mouth daily    Idiopathic gout, unspecified chronicity, unspecified site       cholecalciferol 73265 units capsule    VITAMIN D3    12 capsule    Take 1 capsule (50,000 Units) by mouth once a week    Vitamin D deficiency       DAILY MULTIVITAMIN PO      1 daily        levothyroxine 100 MCG tablet    SYNTHROID/LEVOTHROID    90 tablet    TAKE 1 TABLET (100 MCG) BY MOUTH DAILY    Acquired hypothyroidism       * LYRICA 100 MG capsule   Generic drug:  pregabalin     270 capsule    TAKE 1 CAPSULE BY MOUTH 3 TIMES DAILY     Idiopathic peripheral neuropathy       * pregabalin 150 MG capsule    LYRICA    360 capsule    Take 2 capsules (300 mg) by mouth 2 times daily    Neuropathy       MAGNESIUM OXIDE PO       Malaise       mometasone 50 MCG/ACT spray    NASONEX    3 Box    Spray 2 sprays into both nostrils daily    Chronic allergic rhinitis, unspecified seasonality, unspecified trigger       omeprazole 20 MG CR capsule    priLOSEC    90 capsule    Take 1 capsule (20 mg) by mouth daily    Gastroesophageal reflux disease without esophagitis       tadalafil 20 MG tablet    CIALIS    20 tablet    Take 1 tablet (20 mg) by mouth daily as needed    Impotence of organic origin       VITAMIN B 12 PO       Malaise       VITAMIN B-1 PO       Malaise       * Notice:  This list has 2 medication(s) that are the same as other medications prescribed for you. Read the directions carefully, and ask your doctor or other care provider to review them with you.

## 2018-06-22 NOTE — PROGRESS NOTES
"Refill lyrica    Side effects: sometimes feels fuzzy    Weight gain: no  Xerostomia: no  Dizziness: no  Peripheral edema: no    Having more pain particularly at night     Renal clearance- kidney function tests recommended    Biking and pickle ball in [place]  No longer able to hike because of knee pain (regular knee injections)    When working , took 300 mg twice a day  More pain when in shoes  More pain when puts feet up-  Now mostly- in sandals    No claudication symptoms    Other treatments  He has failed cold lazer on feet  He is interested in \"nerve block:    Medication: often takes 400 mg in a single dose  Requests 500 mg daily RX  Advised 300 mg twice a day SPLIT DOSE    Requests Vitamin D : takes 5000 or 10,000 IU daily    PSA in October  Thyroid labs in October  Uric acid in October     Other concerns:  Mixed hyperlipidemia  Good diet  Fasting today, wishes recheck  No exertional symptoms- active lifestyle    OBJECTIVE:  /78 (BP Location: Left arm, Patient Position: Sitting, Cuff Size: Adult Large)  Pulse 56  Temp 98.2  F (36.8  C) (Oral)  Wt 117 kg (258 lb)  SpO2 95%  BMI 33.13 kg/m2   Regular rate and  rhythm.  No edema or JVD. Chest is clear; no wheezes or rales.  Full peripheral pulses  Filament testing deferred- several evaluations previously for neuropathy    Assessment    (G62.9) Neuropathy  (primary encounter diagnosis)  Comment: talked about vitamin D - stored vitamin- can take too high of a dose  Plan: BASIC METABOLIC PANEL (QUEST), VENOUS         COLLECTION, pregabalin (LYRICA) 150 MG capsule,        Vitamin D Deficiency            (Z79.899) Medication management  Comment:   Plan: BASIC METABOLIC PANEL (QUEST), VENOUS         COLLECTION            (E78.2) Mixed hyperlipidemia  Comment:   Plan: Lipid Profile    More than 50% of visit spent in counseling.                  "

## 2018-06-23 LAB
BUN SERPL-MCNC: 21 MG/DL (ref 7–25)
BUN/CREATININE RATIO: NORMAL (CALC) (ref 6–22)
CALCIUM SERPL-MCNC: 9.2 MG/DL (ref 8.6–10.3)
CHLORIDE SERPLBLD-SCNC: 107 MMOL/L (ref 98–110)
CHOLEST SERPL-MCNC: 186 MG/DL
CHOLEST/HDLC SERPL: 6 (CALC)
CO2 SERPL-SCNC: 22 MMOL/L (ref 20–31)
CREAT SERPL-MCNC: 0.94 MG/DL (ref 0.7–1.25)
EGFR AFRICAN AMERICAN - QUEST: 98 ML/MIN/1.73M2
GFR SERPL CREATININE-BSD FRML MDRD: 85 ML/MIN/1.73M2
GLUCOSE - QUEST: 97 MG/DL (ref 65–99)
HDLC SERPL-MCNC: 31 MG/DL
LDLC SERPL CALC-MCNC: 125 MG/DL (CALC)
NONHDLC SERPL-MCNC: 155 MG/DL (CALC)
POTASSIUM SERPL-SCNC: 4.2 MMOL/L (ref 3.5–5.3)
SODIUM SERPL-SCNC: 139 MMOL/L (ref 135–146)
TRIGL SERPL-MCNC: 187 MG/DL
VITAMIN D, 25-OH, TOTAL - QUEST: 56 NG/ML (ref 30–100)

## 2018-07-11 ENCOUNTER — PATIENT OUTREACH (OUTPATIENT)
Dept: CARE COORDINATION | Facility: CLINIC | Age: 68
End: 2018-07-11

## 2018-07-11 DIAGNOSIS — Z76.89 HEALTH CARE HOME: Primary | ICD-10-CM

## 2018-07-11 NOTE — PROGRESS NOTES
Clinic Care Coordination Contact    Clinic Care Coordination Contact  OUTREACH    Referral Information:  Referral Source: PCP         Chief Complaint   Patient presents with     Clinic Care Coordination - Initial        Universal Utilization:   Clinic Utilization  Difficulty keeping appointments:: No  Utilization    Last refreshed: 7/6/2018  4:05 PM:  No Show Count (past year) 0       Last refreshed: 7/6/2018  4:05 PM:  ED visits 0       Last refreshed: 7/6/2018  4:05 PM:  Hospital admissions 0          Current as of: 7/6/2018  4:05 PM         RN CC outreach post PCP visit to review medication doing and discuss patient concerns.      Medication Management:  Patient statues that he had been altering dose of Allopurinol from the dose of 300 to 100 mg as he felt that he did not gout present and was concern about possible side effect of decrease urination while he was on the 300 mg dose.     RN CC reviewed PCP recommendation of maintaining a minimum therapeutic dose of 200 mg  to decrease likelihood of flair up.   Patient agree to dosing at PCP recommendation of 200 mg daily and will follow up with PCP in October for uric acid labs.      Vitamin D   Patient also noted that he would like to review Vitamin D supplementation need and recommendations for dosing at next visit.   He did decrease is intake to 5000 IU daily as discussed at PCP visit but had received some varied recommendations from spouse nutritionist.   Of note patient does spend pa in [place] and is aware of benefits to natural sunlight for Vitamin D levels.       Patient/Caregiver understanding: Patient verbalized understanding, engaged in AIDET communication behavior during encounter.            Plan: Patient will follow recommendations for dosing medications as directed by PCP.   Patient will notify PCP if any concern for side effects noted,and follow up in October with PCP for lab work and review of dosing.   RN CC will be available in future if  needed, contact information given.    Christi Palencia RN  Clinic Care Coordinator  Mobile: [phone]  [email or web address]

## 2018-07-11 NOTE — LETTER
[facility]  [facility] [address]  [place]  [address]      July 11, 2018    Rojelio Mosqueda  [address] [facility] [address]      Dear Rojelio,    I am a clinic care coordinator who works with Viviane Ann MD at [facility]. I wanted to thank you for spending the time to talk with me.  I wanted to introduce myself and provide you with my contact information so that you can call me with questions or concerns about your health care. Below is a description of clinic care coordination and how I can further assist you.     The clinic care coordinator is a registered nurse and/or  who understand the health care system. The goal of clinic care coordination is to help you manage your health and improve access to the [facility] system in the most efficient manner. The registered nurse can assist you in meeting your health care goals by providing education, coordinating services, and strengthening the communication among your providers. The  can assist you with financial, behavioral, psychosocial, chemical dependency, counseling, and/or psychiatric resources.    Please feel free to contact me at [phone], with any questions or concerns. We at [facility] are focused on providing you with the highest-quality healthcare experience possible and that all starts with you.     Sincerely,     Christi Palencia    Enclosed: I have enclosed a copy of a 24 Hour Access Plan. This has helpful phone numbers for you to call when needed. Please keep this in an easy to access place to use as needed.

## 2018-07-11 NOTE — LETTER
Health Care Home - Access Care Plan    About Me  Patient Name:  Rojelio Mosqueda    YOB: 1950  Age:                             67 year old   [place] MRN:            [id number] Telephone Information:     Home Phone [phone]   Mobile [phone]       Address:    [address]  [facility] [address] Email address:  [email or web address]      Emergency Contact(s)  Name Relationship Lgl Grd Work Phone Home Phone Mobile Phone   1. PREETI,PAIGE* Spouse   [phone]             Health Maintenance: Routine Health maintenance Reviewed: Up to date    My Access Plan  Medical Emergency 911   Questions or concerns during clinic hours Primary Clinic Line, I will call the clinic directly:   - [phone]   24 Hour Appointment Line [phone] or  [phone]) (toll free)   24 Hour Nurse Line [phone] (toll free)   Questions or concerns outside clinic hours 24 Hour Appointment Line, I will call the after-hours on-call line:   [facility] [phone]   Preferred Urgent Care [facility], [phone]   Preferred Hospital [facility]  [phone]   Preferred Pharmacy [id number] - [place], [place] - [address]     Behavioral Health Crisis Line The National Suicide Prevention Lifeline at 1-635.795.3675 or 911     My Care Team Members  Patient Care Team       Relationship Specialty Notifications Start End    Viviane Ann MD PCP - General   5/31/01     Phone: [phone] Fax: [phone] [address] [address]           My Medical and Care Information  Problem List   Patient Active Problem List   Diagnosis     ESOPHAGEAL REFLUX/ hiatal hernia     Family history of diabetes mellitus     Depressive disorder, not elsewhere classified     Impotence of organic origin     Neuropathy     Peripheral neuropathy     Elevated blood uric acid level      Hypothyroidism     Health Care Home     ACP (advance care planning)     Abnormal glucose      Current Medications and Allergies:  See printed Medication Report

## 2018-07-12 ENCOUNTER — TELEPHONE (OUTPATIENT)
Dept: FAMILY MEDICINE | Facility: CLINIC | Age: 68
End: 2018-07-12

## 2018-07-12 ENCOUNTER — TRANSFERRED RECORDS (OUTPATIENT)
Dept: FAMILY MEDICINE | Facility: CLINIC | Age: 68
End: 2018-07-12

## 2018-09-19 ENCOUNTER — TRANSFERRED RECORDS (OUTPATIENT)
Dept: FAMILY MEDICINE | Facility: CLINIC | Age: 68
End: 2018-09-19

## 2018-09-27 ENCOUNTER — HOSPITAL ENCOUNTER (OUTPATIENT)
Dept: LAB | Facility: CLINIC | Age: 68
Discharge: HOME | End: 2018-09-27
Attending: ORTHOPAEDIC SURGERY | Admitting: ORTHOPAEDIC SURGERY
Payer: MEDICARE

## 2018-09-27 DIAGNOSIS — Z01.812 PRE-OPERATIVE LABORATORY EXAMINATION: ICD-10-CM

## 2018-09-27 LAB
MRSA DNA SPEC QL NAA+PROBE: NEGATIVE
SPECIMEN SOURCE: NORMAL

## 2018-09-27 PROCEDURE — 40000829 ZZHCL STATISTIC STAPH AUREUS SUSCEPT SCREEN PCR: Performed by: ORTHOPAEDIC SURGERY

## 2018-09-27 PROCEDURE — 40000830 ZZHCL STATISTIC STAPH AUREUS METH RESIST SCREEN PCR: Performed by: ORTHOPAEDIC SURGERY

## 2018-09-27 PROCEDURE — 87641 MR-STAPH DNA AMP PROBE: CPT | Performed by: ORTHOPAEDIC SURGERY

## 2018-09-27 PROCEDURE — 87640 STAPH A DNA AMP PROBE: CPT | Performed by: ORTHOPAEDIC SURGERY

## 2018-09-28 NOTE — PLAN OF CARE
Patient and surgeon notified of MSSA positive nasal screen. Discussed instructions for mupirocin ointment and showering with CHG, answered questions, patient verbalized understanding.

## 2018-10-01 ENCOUNTER — OFFICE VISIT (OUTPATIENT)
Dept: FAMILY MEDICINE | Facility: CLINIC | Age: 68
End: 2018-10-01

## 2018-10-01 VITALS
TEMPERATURE: 98.2 F | DIASTOLIC BLOOD PRESSURE: 68 MMHG | OXYGEN SATURATION: 98 % | WEIGHT: 259 LBS | BODY MASS INDEX: 32.2 KG/M2 | HEART RATE: 62 BPM | SYSTOLIC BLOOD PRESSURE: 114 MMHG | HEIGHT: 75 IN

## 2018-10-01 DIAGNOSIS — H60.391 INFECTIVE OTITIS EXTERNA, RIGHT: ICD-10-CM

## 2018-10-01 DIAGNOSIS — E03.9 ACQUIRED HYPOTHYROIDISM: ICD-10-CM

## 2018-10-01 DIAGNOSIS — M17.10 ARTHRITIS OF KNEE: ICD-10-CM

## 2018-10-01 DIAGNOSIS — Z01.818 PREOPERATIVE EXAMINATION: Primary | ICD-10-CM

## 2018-10-01 PROBLEM — R73.09 ABNORMAL GLUCOSE: Status: RESOLVED | Noted: 2017-06-26 | Resolved: 2018-10-01

## 2018-10-01 LAB
ERYTHROCYTE [DISTWIDTH] IN BLOOD BY AUTOMATED COUNT: 13.6 %
HCT VFR BLD AUTO: 43 % (ref 40–53)
HEMOGLOBIN: 14.2 G/DL (ref 13.3–17.7)
MCH RBC QN AUTO: 31.5 PG (ref 26–33)
MCHC RBC AUTO-ENTMCNC: 33 G/DL (ref 31–36)
MCV RBC AUTO: 95.3 FL (ref 78–100)
PLATELET COUNT - QUEST: 180 10^9/L (ref 150–375)
RBC # BLD AUTO: 4.51 10*12/L (ref 4.4–5.9)
WBC # BLD AUTO: 3.9 10*9/L (ref 4–11)

## 2018-10-01 PROCEDURE — 85027 COMPLETE CBC AUTOMATED: CPT | Performed by: FAMILY MEDICINE

## 2018-10-01 PROCEDURE — 36415 COLL VENOUS BLD VENIPUNCTURE: CPT | Performed by: FAMILY MEDICINE

## 2018-10-01 PROCEDURE — 99214 OFFICE O/P EST MOD 30 MIN: CPT | Performed by: FAMILY MEDICINE

## 2018-10-01 PROCEDURE — 93000 ELECTROCARDIOGRAM COMPLETE: CPT | Performed by: FAMILY MEDICINE

## 2018-10-01 RX ORDER — HYDROCORTISONE AND ACETIC ACID 20.75; 10.375 MG/ML; MG/ML
4 SOLUTION AURICULAR (OTIC) 2 TIMES DAILY
Qty: 10 ML | Refills: 0 | Status: SHIPPED | OUTPATIENT
Start: 2018-10-01 | End: 2019-08-01

## 2018-10-01 RX ORDER — HYDROCORTISONE AND ACETIC ACID 20.75; 10.375 MG/ML; MG/ML
4 SOLUTION AURICULAR (OTIC) 2 TIMES DAILY
Qty: 10 ML | Refills: 0 | Status: SHIPPED | OUTPATIENT
Start: 2018-10-01 | End: 2018-10-01

## 2018-10-01 NOTE — MR AVS SNAPSHOT
After Visit Summary   10/1/2018    Rojelio Mosqueda    MRN: [id number]           Patient Information     Date Of Birth          1950        Visit Information        Provider Department      10/1/2018 3:30 PM Viviane Ann MD [facility]        Today's Diagnoses     Preoperative examination    -  1    Arthritis of knee        Acquired hypothyroidism        Infective otitis externa, right          Care Instructions    Stop vitamins and nutrition supplements a week before surgery    No ibuprofen starting   Can take tylenol for pain   No aspirin    OK to take thyroid morning of surgery  Omeprazole before dinner the night before          Follow-ups after your visit        Your next 10 appointments already scheduled     Oct 08, 2018   Procedure with Javi Torres MD   [facility] (--)    [address] [facility] [address] [phone]              Who to contact     If you have questions or need follow up information about today's clinic visit or your schedule please contact [facility] directly at [phone].  Normal or non-critical lab and imaging results will be communicated to you by Rosumhart, letter or phone within 4 business days after the clinic has received the results. If you do not hear from us within 7 days, please contact the clinic through Rosumhart or phone. If you have a critical or abnormal lab result, we will notify you by phone as soon as possible.  Submit refill requests through Asurvest or call your pharmacy and they will forward the refill request to us. Please allow 3 business days for your refill to be completed.          Additional Information About Your Visit        Rosumhart Information     Asurvest gives you secure access to your electronic health record. If you see a primary care provider, you can also send messages to your care team and make appointments. If you have questions, please  "call your primary care clinic.  If you do not have a primary care provider, please call [phone] and they will assist you.        Care EveryWhere ID     This is your Care EveryWhere ID. This could be used by other organizations to access your [place] medical records  [id number]        Your Vitals Were     Pulse Temperature Height Pulse Oximetry BMI (Body Mass Index)       62 98.2  F (36.8  C) (Oral) 1.892 m (6' 2.5\") 98% 32.81 kg/m2        Blood Pressure from Last 3 Encounters:   10/01/18 114/68   06/22/18 112/78   10/18/17 104/70    Weight from Last 3 Encounters:   10/01/18 117.5 kg (259 lb)   06/22/18 117 kg (258 lb)   10/18/17 118.6 kg (261 lb 6.4 oz)              We Performed the Following     BASIC METABOLIC PANEL (QUEST)     EKG 12-lead complete w/read - Clinics     FERRITIN (QUEST)     HEMOGRAM/PLATELET (BFP)     Iron and iron binding capacity (QUEST)     VENOUS COLLECTION          Today's Medication Changes          These changes are accurate as of 10/1/18  4:15 PM.  If you have any questions, ask your nurse or doctor.               Start taking these medicines.        Dose/Directions    acetic acid-hydrocortisone otic solution   Commonly known as:  VOSOL-HC   Used for:  Infective otitis externa, right   Started by:  Viviane Ann MD        Dose:  4 drop   Place 4 drops into both ears 2 times daily   Quantity:  10 mL   Refills:  0            Where to get your medicines      These medications were sent to [facility]/pharmacy #[id number] - [facility] [address]  [address] [facility] [address] [address]     Phone:  [phone]     acetic acid-hydrocortisone otic solution                Primary Care Provider Office Phone # Fax #    Viviane Ann [phone] [address] [address]        Equal Access to Services     CATRINA PRAKASH AH: Hadii emily bhandari hadasho Soomaali, waaxda luqadaha, qaybta kaalmada adeegyada, lyly ramos " ah. So [facility] [phone].    ATENCIÓN: Si byron moreno, tiene a merida disposición servicios gratuitos de asistencia lingüística. Erika al [phone].    We comply with applicable federal civil rights laws and [place] laws. We do not discriminate on the basis of race, color, national origin, age, disability, sex, sexual orientation, or gender identity.            Thank you!     Thank you for choosing [facility]  for your care. Our goal is always to provide you with excellent care. Hearing back from our patients is one way we can continue to improve our services. Please take a few minutes to complete the written survey that you may receive in the mail after your visit with us. Thank you!             Your Updated Medication List - Protect others around you: Learn how to safely use, store and throw away your medicines at www.disposemymeds.org.          This list is accurate as of 10/1/18  4:15 PM.  Always use your most recent med list.                   Brand Name Dispense Instructions for use Diagnosis    acetic acid-hydrocortisone otic solution    VOSOL-HC    10 mL    Place 4 drops into both ears 2 times daily    Infective otitis externa, right       ACIDOPHILUS LACTOBACILLUS PO      Take by mouth daily Takes two tabs        ADULT NUTRITIONAL SUPPLEMENT OR           allopurinol 100 MG tablet    ZYLOPRIM    180 tablet    Take 2 tablets (200 mg) by mouth daily    Idiopathic gout, unspecified chronicity, unspecified site       cholecalciferol 28117 units capsule    VITAMIN D3    12 capsule    Take 1 capsule (50,000 Units) by mouth once a week    Vitamin D deficiency       levothyroxine 100 MCG tablet    SYNTHROID/LEVOTHROID    90 tablet    TAKE 1 TABLET (100 MCG) BY MOUTH DAILY    Acquired hypothyroidism       * LYRICA 100 MG capsule   Generic drug:  pregabalin     270 capsule    TAKE 1 CAPSULE BY MOUTH 3 TIMES DAILY    Idiopathic peripheral neuropathy       * pregabalin 150 MG capsule    LYRICA     360 capsule    Take 2 capsules (300 mg) by mouth 2 times daily    Neuropathy       mometasone 50 MCG/ACT spray    NASONEX    3 Box    Spray 2 sprays into both nostrils daily    Chronic allergic rhinitis, unspecified seasonality, unspecified trigger       mupirocin 2 % nasal ointment    BACTROBAN     Apply 1 g into each nare 2 times daily BID for 7 days prior to surgery        NONFORMULARY      Yang-remidy one daily        NONFORMULARY      A-reds2 one daily        NONFORMULARY      L-Glutamine 1 tab at bedtime        omeprazole 20 MG CR capsule    priLOSEC    90 capsule    Take 1 capsule (20 mg) by mouth daily    Gastroesophageal reflux disease without esophagitis       tadalafil 20 MG tablet    CIALIS    20 tablet    Take 1 tablet (20 mg) by mouth daily as needed    Impotence of organic origin       * Notice:  This list has 2 medication(s) that are the same as other medications prescribed for you. Read the directions carefully, and ask your doctor or other care provider to review them with you.

## 2018-10-01 NOTE — PATIENT INSTRUCTIONS
Stop vitamins and nutrition supplements a week before surgery    No ibuprofen starting   Can take tylenol for pain   No aspirin    OK to take thyroid morning of surgery  Omeprazole before dinner the night before

## 2018-10-02 LAB
% SATURATION - QUEST: 30 % (CALC) (ref 15–60)
BUN SERPL-MCNC: 24 MG/DL (ref 7–25)
BUN/CREATININE RATIO: ABNORMAL (CALC) (ref 6–22)
CALCIUM SERPL-MCNC: 9.6 MG/DL (ref 8.6–10.3)
CHLORIDE SERPLBLD-SCNC: 104 MMOL/L (ref 98–110)
CO2 SERPL-SCNC: 25 MMOL/L (ref 20–32)
CREAT SERPL-MCNC: 0.96 MG/DL (ref 0.7–1.25)
EGFR AFRICAN AMERICAN - QUEST: 96 ML/MIN/1.73M2
FERRITIN SERPL-MCNC: 66 NG/ML (ref 20–380)
GFR SERPL CREATININE-BSD FRML MDRD: 83 ML/MIN/1.73M2
GLUCOSE - QUEST: 103 MG/DL (ref 65–99)
IRON: 96 MCG/DL (ref 50–180)
POTASSIUM SERPL-SCNC: 4.2 MMOL/L (ref 3.5–5.3)
SODIUM SERPL-SCNC: 138 MMOL/L (ref 135–146)
TIBC - QUEST: 324 MCG/DL (CALC) (ref 250–425)

## 2018-10-02 NOTE — PHARMACY-ADMISSION MEDICATION HISTORY
Admission medication history interview status for this patient is complete. See [facility] admission navigator for allergy information, prior to admission medications and immunization status.     Med rec completed by pre admitting Neris Petit, RN u Sep 20, 2018  1:50 PM         Prior to Admission medications    Medication Sig Last Dose Taking? Auth Provider   ACIDOPHILUS LACTOBACILLUS PO Take by mouth daily Takes two tabs  Yes Reported, Patient   allopurinol (ZYLOPRIM) 100 MG tablet Take 2 tablets (200 mg) by mouth daily  Yes Viviane Ann MD   cholecalciferol (VITAMIN D3) 50695 UNITS capsule Take 1 capsule (50,000 Units) by mouth once a week  Yes Viviane Ann MD   levothyroxine (SYNTHROID/LEVOTHROID) 100 MCG tablet TAKE 1 TABLET (100 MCG) BY MOUTH DAILY  Yes Viviane Ann MD   LYRICA 100 MG capsule TAKE 1 CAPSULE BY MOUTH 3 TIMES DAILY  Yes Viviane Ann MD   mometasone (NASONEX) 50 MCG/ACT spray Spray 2 sprays into both nostrils daily  Yes Viviane Ann MD   mupirocin (BACTROBAN) 2 % nasal ointment Apply 1 g into each nare 2 times daily BID for 7 days prior to surgery  Yes Reported, Patient   NONFORMULARY Yang-remidy one daily  Yes Reported, Patient   NONFORMULARY A-reds2 one daily  Yes Reported, Patient   NONFORMULARY L-Glutamine 1 tab at bedtime  Yes Reported, Patient   omeprazole (PRILOSEC) 20 MG CR capsule Take 1 capsule (20 mg) by mouth daily  Yes Viviane Ann MD   pregabalin (LYRICA) 150 MG capsule Take 2 capsules (300 mg) by mouth 2 times daily  Yes Viviane Ann MD   tadalafil (CIALIS) 20 MG tablet Take 1 tablet (20 mg) by mouth daily as needed  Yes Viviane Ann MD   acetic acid-hydrocortisone (VOSOL-HC) otic solution Place 4 drops into both ears 2 times daily   Viviane Ann MD   Nutritional Supplements (ADULT NUTRITIONAL SUPPLEMENT OR)    Reported, Patient

## 2018-10-03 NOTE — H&P (VIEW-ONLY)
[facility]  [address]  [address]  [facility] [address]  [phone]  Dept: [phone]    PRE-OP EVALUATION:  Today's date: 10/1/2018    Rojelio Mosqueda (: 1950) presents for pre-operative evaluation assessment as requested by Dr. Torres.  He requires evaluation and anesthesia risk assessment prior to undergoing surgery/procedure for treatment of knee cartilage detioration .    Proposed Surgery/ Procedure: Right knee replacement  Date of Surgery/ Procedure: 10/8/18  Time of Surgery/ Procedure: 7:20 am  Hospital/Surgical Facility: [facility]  Can see in [facility]   Primary Physician: Viviane Ann  Type of Anesthesia Anticipated: Spinal    Patient has a Health Care Directive or Living Will:  NO    1. NO - Do you have a history of heart attack, stroke, stent, bypass or surgery on an artery in the head, neck, heart or legs?  2. NO - Do you ever have any pain or discomfort in your chest?  3. NO - Do you have a history of  Heart Failure?  4. NO - Are you troubled by shortness of breath when: walking on the level, up a slight hill or at night?  5. NO - Do you currently have a cold, bronchitis or other respiratory infection?  6. YES - DO YOU HAVE A COUGH, SHORTNESS OF BREATH OR WHEEZING? Has a cough, that he relates to allergies  7. NO - Do you sometimes get pains in the calves of your legs when you walk?  8. NO - Do you or anyone in your family have previous history of blood clots?  9. NO - Do you or does anyone in your family have a serious bleeding problem such as prolonged bleeding following surgeries or cuts?  10. NO - Have you ever had problems with anemia or been told to take iron pills?  11. NO - Have you had any abnormal blood loss such as black, tarry or bloody stools, or abnormal vaginal bleeding?  12. NO - Have you ever had a blood transfusion?  13. NO - Have you or any of your relatives ever had problems with anesthesia?  14. NO - Do you have sleep  apnea, excessive snoring or daytime drowsiness?  15. NO - Do you have any prosthetic heart valves?  16. NO - Do you have prosthetic joints?  17. NO - Is there any chance that you may be pregnant?      HPI:     HPI related to upcoming procedure: right knee pain- worse in the last year-       MEDICAL HISTORY:     Patient Active Problem List    Diagnosis Date Noted     ACP (advance care planning) 07/06/2016     Priority: Medium     Advance Care Planning 7/6/2016: ACP Review of Chart / Resources Provided:  Reviewed chart for advance care plan.  Rojelio Mosqueda has no plan or code status on file. Discussed available resources and provided with information. Confirmed code status reflects current choices pending further ACP discussions.  Confirmed/documented legally designated decision makers.  Added by Babita Turcios             Hypothyroidism 05/04/2012     Priority: Medium     Problem list name updated by automated process. Provider to review       Elevated blood uric acid level 10/03/2011     Priority: Medium     (Problem list name updated by automated process. Provider to review and confirm.)       Peripheral neuropathy      Priority: Medium     Impotence of organic origin 01/15/2004     Priority: Medium     ESOPHAGEAL REFLUX/ hiatal hernia      Priority: Medium     Family history of diabetes mellitus      Priority: Medium     Health Care Home 11/29/2012     Priority: Low     State Tier Level:  Tier 1  Status:  NA  Care Coordinator:    See Letters for [facility] Care Plan            Past Medical History:   Diagnosis Date     Arthritis      Depressive disorder, not elsewhere classified 7/21/2002     Esophageal reflux      Family history of diabetes mellitus      GERD (gastroesophageal reflux disease)      Idiopathic neuropathy      Peripheral neuropathy      Thyroid disease      Past Surgical History:   Procedure Laterality Date     C NONSPECIFIC PROCEDURE  2/2011    upper endos/ mod hiatal hernia/ antireflux tx      COLONOSCOPY  2007    [facility]     HERNIA REPAIR      under the age of [age]     lt. foot-surgery       Current Outpatient Prescriptions   Medication Sig Dispense Refill     ACIDOPHILUS LACTOBACILLUS PO Take by mouth daily Takes two tabs       allopurinol (ZYLOPRIM) 100 MG tablet Take 2 tablets (200 mg) by mouth daily 180 tablet 3     cholecalciferol (VITAMIN D3) 85527 UNITS capsule Take 1 capsule (50,000 Units) by mouth once a week 12 capsule 0     levothyroxine (SYNTHROID/LEVOTHROID) 100 MCG tablet TAKE 1 TABLET (100 MCG) BY MOUTH DAILY 90 tablet 1     LYRICA 100 MG capsule TAKE 1 CAPSULE BY MOUTH 3 TIMES DAILY 270 capsule 0     mometasone (NASONEX) 50 MCG/ACT spray Spray 2 sprays into both nostrils daily 3 Box 3     mupirocin (BACTROBAN) 2 % nasal ointment Apply 1 g into each nare 2 times daily BID for 7 days prior to surgery       NONFORMULARY Yang-remidy one daily       NONFORMULARY A-reds2 one daily       Nutritional Supplements (ADULT NUTRITIONAL SUPPLEMENT OR)        omeprazole (PRILOSEC) 20 MG CR capsule Take 1 capsule (20 mg) by mouth daily 90 capsule 3     pregabalin (LYRICA) 150 MG capsule Take 2 capsules (300 mg) by mouth 2 times daily 360 capsule 3     tadalafil (CIALIS) 20 MG tablet Take 1 tablet (20 mg) by mouth daily as needed 20 tablet prn     NONFORMULARY L-Glutamine 1 tab at bedtime       OTC products: no recent use of OTC ASA, NSAIDS or Steroids    Allergies   Allergen Reactions     Cat Hair Extract Shortness Of Breath     Dust Mites Itching     No Known Drug Allergies       Latex Allergy: NO    Social History   Substance Use Topics     Smoking status: Former Smoker     Packs/day: 1.00     Years: 15.00     Types: Cigarettes     Quit date: 1/1/2004     Smokeless tobacco: Never Used      Comment: Smoked off & on     Alcohol use 1.5 oz/week     3 Standard drinks or equivalent per week      Comment: 2-3 days per week 1-2 drinks per time     History   Drug Use No       REVIEW OF SYSTEMS:  "  CONSTITUTIONAL: NEGATIVE for fever, chills, change in weight  ENT/MOUTH: NEGATIVE for ear, mouth and throat problems  RESP: NEGATIVE for significant cough or SOB  CV: NEGATIVE for chest pain, palpitations or peripheral edema  GI: NEGATIVE for nausea, abdominal pain, heartburn, or change in bowel habits (looser stools- more stools)    EXAM:   /68 (BP Location: Left arm, Patient Position: Sitting, Cuff Size: Adult Large)  Pulse 62  Temp 98.2  F (36.8  C) (Oral)  Ht 1.892 m (6' 2.5\")  Wt 117.5 kg (259 lb)  SpO2 98%  BMI 32.81 kg/m2  GENERAL APPEARANCE: healthy, alert and no distress  HENT: ear canals and TM's normal and nose and mouth without ulcers or lesions  RESP: lungs clear to auscultation - no rales, rhonchi or wheezes  CV: regular rate and rhythm, normal S1 S2, no S3 or S4 and no murmur, click or rub   ABDOMEN: soft, nontender, no HSM or masses and bowel sounds normal  NEURO: Normal strength and tone, sensory exam grossly normal, mentation intact and speech normal    DIAGNOSTICS:   EKG: Normal Sinus Rhythm, nonspecific ST-T changes    Hemoglobin   Date Value Ref Range Status   10/01/2018 14.2 13.3 - 17.7 g/dL Final   ]    Potassium   Date Value Ref Range Status   10/01/2018 4.2 3.5 - 5.3 mmol/L Final     Lab Results   Component Value Date    CR 0.96 10/01/2018         Recent Labs   Lab Test  06/22/18   0805  06/22/17   0921  09/09/16   1200   08/20/10   1201   HGB   --    --   14.6   --   13.7*   PLT   --    --   208   --   328   NA  139  137   --    < >   --    POTASSIUM  4.2  4.9   --    < >   --    CR  0.94  0.98   --    < >   --     < > = values in this interval not displayed.        IMPRESSION:   Reason for surgery/procedure: total knee replacement    The proposed surgical procedure is considered INTERMEDIATE risk.    REVISED CARDIAC RISK INDEX  The patient has the following serious cardiovascular risks for perioperative complications such as (MI, PE, VFib and 3  AV Block):  No serious cardiac " risks  INTERPRETATION: 0 risks: Class I (very low risk - 0.4% complication rate)    The patient has the following additional risks for perioperative complications:  No identified additional risks      ICD-10-CM    1. Preoperative examination Z01.818 BASIC METABOLIC PANEL (QUEST)     HEMOGRAM/PLATELET (BFP)     Iron and iron binding capacity (QUEST)     FERRITIN (QUEST)     EKG 12-lead complete w/read - Clinics   2. Arthritis of knee M17.10 BASIC METABOLIC PANEL (QUEST)     HEMOGRAM/PLATELET (BFP)     Iron and iron binding capacity (QUEST)     FERRITIN (QUEST)     EKG 12-lead complete w/read - Clinics   3. Acquired hypothyroidism E03.9 EKG 12-lead complete w/read - Clinics       RECOMMENDATIONS:     Using nasal bactroban- MRSA culture positive     --Patient is to take all scheduled medications on the day of surgery -  No vitamins or nutritional supplements    APPROVAL GIVEN to proceed with proposed procedure, without further diagnostic evaluation       Signed Electronically by: Viviane Ann MD    Copy of this evaluation report is provided to requesting physician.    [facility] Preop Guidelines    Revised Cardiac Risk Index

## 2018-10-06 ENCOUNTER — MYC REFILL (OUTPATIENT)
Dept: FAMILY MEDICINE | Facility: CLINIC | Age: 68
End: 2018-10-06

## 2018-10-06 DIAGNOSIS — K21.9 GASTROESOPHAGEAL REFLUX DISEASE WITHOUT ESOPHAGITIS: ICD-10-CM

## 2018-10-08 ENCOUNTER — APPOINTMENT (OUTPATIENT)
Dept: GENERAL RADIOLOGY | Facility: CLINIC | Age: 68
DRG: 470 | End: 2018-10-08
Attending: ORTHOPAEDIC SURGERY
Payer: MEDICARE

## 2018-10-08 ENCOUNTER — ANESTHESIA EVENT (OUTPATIENT)
Dept: SURGERY | Facility: CLINIC | Age: 68
DRG: 470 | End: 2018-10-08
Payer: MEDICARE

## 2018-10-08 ENCOUNTER — APPOINTMENT (OUTPATIENT)
Dept: PHYSICAL THERAPY | Facility: CLINIC | Age: 68
DRG: 470 | End: 2018-10-08
Attending: ORTHOPAEDIC SURGERY
Payer: MEDICARE

## 2018-10-08 ENCOUNTER — HOSPITAL ENCOUNTER (INPATIENT)
Facility: CLINIC | Age: 68
LOS: 2 days | Discharge: HOME | DRG: 470 | End: 2018-10-10
Attending: ORTHOPAEDIC SURGERY | Admitting: ORTHOPAEDIC SURGERY
Payer: MEDICARE

## 2018-10-08 ENCOUNTER — ANESTHESIA (OUTPATIENT)
Dept: SURGERY | Facility: CLINIC | Age: 68
DRG: 470 | End: 2018-10-08
Payer: MEDICARE

## 2018-10-08 DIAGNOSIS — Z96.651 STATUS POST TOTAL RIGHT KNEE REPLACEMENT: Primary | ICD-10-CM

## 2018-10-08 LAB — GLUCOSE BLDC GLUCOMTR-MCNC: 123 MG/DL (ref 70–99)

## 2018-10-08 PROCEDURE — 40000986 XR KNEE PORT RT 1/2 VW: Mod: RT

## 2018-10-08 PROCEDURE — 25000132 ZZH RX MED GY IP 250 OP 250 PS 637: Mod: GY | Performed by: ORTHOPAEDIC SURGERY

## 2018-10-08 PROCEDURE — 97110 THERAPEUTIC EXERCISES: CPT | Mod: GP

## 2018-10-08 PROCEDURE — 00000146 ZZHCL STATISTIC GLUCOSE BY METER IP

## 2018-10-08 PROCEDURE — 71000012 ZZH RECOVERY PHASE 1 LEVEL 1 FIRST HR: Performed by: ORTHOPAEDIC SURGERY

## 2018-10-08 PROCEDURE — 25000125 ZZHC RX 250: Performed by: PHYSICIAN ASSISTANT

## 2018-10-08 PROCEDURE — A9270 NON-COVERED ITEM OR SERVICE: HCPCS | Mod: GY | Performed by: PHYSICIAN ASSISTANT

## 2018-10-08 PROCEDURE — 25800025 ZZH RX 258: Performed by: ORTHOPAEDIC SURGERY

## 2018-10-08 PROCEDURE — C1713 ANCHOR/SCREW BN/BN,TIS/BN: HCPCS | Performed by: ORTHOPAEDIC SURGERY

## 2018-10-08 PROCEDURE — 25000132 ZZH RX MED GY IP 250 OP 250 PS 637: Mod: GY | Performed by: PHYSICIAN ASSISTANT

## 2018-10-08 PROCEDURE — 37000008 ZZH ANESTHESIA TECHNICAL FEE, 1ST 30 MIN: Performed by: ORTHOPAEDIC SURGERY

## 2018-10-08 PROCEDURE — 25000125 ZZHC RX 250: Performed by: ANESTHESIOLOGY

## 2018-10-08 PROCEDURE — 36000093 ZZH SURGERY LEVEL 4 1ST 30 MIN: Performed by: ORTHOPAEDIC SURGERY

## 2018-10-08 PROCEDURE — 40000306 ZZH STATISTIC PRE PROC ASSESS II: Performed by: ORTHOPAEDIC SURGERY

## 2018-10-08 PROCEDURE — 40000193 ZZH STATISTIC PT WARD VISIT

## 2018-10-08 PROCEDURE — 37000009 ZZH ANESTHESIA TECHNICAL FEE, EACH ADDTL 15 MIN: Performed by: ORTHOPAEDIC SURGERY

## 2018-10-08 PROCEDURE — 25000128 H RX IP 250 OP 636: Performed by: PHYSICIAN ASSISTANT

## 2018-10-08 PROCEDURE — 12000007 ZZH R&B INTERMEDIATE

## 2018-10-08 PROCEDURE — 97161 PT EVAL LOW COMPLEX 20 MIN: CPT | Mod: GP

## 2018-10-08 PROCEDURE — 25000125 ZZHC RX 250: Performed by: ORTHOPAEDIC SURGERY

## 2018-10-08 PROCEDURE — 25000566 ZZH SEVOFLURANE, EA 15 MIN: Performed by: ORTHOPAEDIC SURGERY

## 2018-10-08 PROCEDURE — 25000128 H RX IP 250 OP 636: Performed by: ORTHOPAEDIC SURGERY

## 2018-10-08 PROCEDURE — 97116 GAIT TRAINING THERAPY: CPT | Mod: GP

## 2018-10-08 PROCEDURE — A9270 NON-COVERED ITEM OR SERVICE: HCPCS | Mod: GY | Performed by: ORTHOPAEDIC SURGERY

## 2018-10-08 PROCEDURE — 97530 THERAPEUTIC ACTIVITIES: CPT | Mod: GP

## 2018-10-08 PROCEDURE — 25000128 H RX IP 250 OP 636: Performed by: ANESTHESIOLOGY

## 2018-10-08 PROCEDURE — 27810169 ZZH OR IMPLANT GENERAL: Performed by: ORTHOPAEDIC SURGERY

## 2018-10-08 PROCEDURE — 27210794 ZZH OR GENERAL SUPPLY STERILE: Performed by: ORTHOPAEDIC SURGERY

## 2018-10-08 PROCEDURE — 3E0U33Z INTRODUCTION OF ANTI-INFLAMMATORY INTO JOINTS, PERCUTANEOUS APPROACH: ICD-10-PCS | Performed by: ORTHOPAEDIC SURGERY

## 2018-10-08 PROCEDURE — 25000128 H RX IP 250 OP 636: Performed by: NURSE ANESTHETIST, CERTIFIED REGISTERED

## 2018-10-08 PROCEDURE — 25000125 ZZHC RX 250: Performed by: NURSE ANESTHETIST, CERTIFIED REGISTERED

## 2018-10-08 PROCEDURE — 36000063 ZZH SURGERY LEVEL 4 EA 15 ADDTL MIN: Performed by: ORTHOPAEDIC SURGERY

## 2018-10-08 PROCEDURE — 0SRC0J9 REPLACEMENT OF RIGHT KNEE JOINT WITH SYNTHETIC SUBSTITUTE, CEMENTED, OPEN APPROACH: ICD-10-PCS | Performed by: ORTHOPAEDIC SURGERY

## 2018-10-08 DEVICE — BONE CEMENT SIMPLEX FULL DOSE 6191-1-001: Type: IMPLANTABLE DEVICE | Site: KNEE | Status: FUNCTIONAL

## 2018-10-08 DEVICE — IMPLANTABLE DEVICE: Type: IMPLANTABLE DEVICE | Site: KNEE | Status: FUNCTIONAL

## 2018-10-08 RX ORDER — ONDANSETRON 4 MG/1
4 TABLET, ORALLY DISINTEGRATING ORAL EVERY 6 HOURS PRN
Status: DISCONTINUED | OUTPATIENT
Start: 2018-10-08 | End: 2018-10-10 | Stop reason: HOSPADM

## 2018-10-08 RX ORDER — ONDANSETRON 2 MG/ML
INJECTION INTRAMUSCULAR; INTRAVENOUS PRN
Status: DISCONTINUED | OUTPATIENT
Start: 2018-10-08 | End: 2018-10-08

## 2018-10-08 RX ORDER — FENTANYL CITRATE 50 UG/ML
25-50 INJECTION, SOLUTION INTRAMUSCULAR; INTRAVENOUS
Status: DISCONTINUED | OUTPATIENT
Start: 2018-10-08 | End: 2018-10-08 | Stop reason: HOSPADM

## 2018-10-08 RX ORDER — LIDOCAINE HYDROCHLORIDE 10 MG/ML
INJECTION, SOLUTION INFILTRATION; PERINEURAL PRN
Status: DISCONTINUED | OUTPATIENT
Start: 2018-10-08 | End: 2018-10-08 | Stop reason: HOSPADM

## 2018-10-08 RX ORDER — OXYCODONE HYDROCHLORIDE 5 MG/1
5-10 TABLET ORAL EVERY 4 HOURS PRN
Status: DISCONTINUED | OUTPATIENT
Start: 2018-10-08 | End: 2018-10-10 | Stop reason: HOSPADM

## 2018-10-08 RX ORDER — CELECOXIB 100 MG/1
100 CAPSULE ORAL 2 TIMES DAILY
Status: COMPLETED | OUTPATIENT
Start: 2018-10-08 | End: 2018-10-10

## 2018-10-08 RX ORDER — SODIUM CHLORIDE, SODIUM LACTATE, POTASSIUM CHLORIDE, CALCIUM CHLORIDE 600; 310; 30; 20 MG/100ML; MG/100ML; MG/100ML; MG/100ML
INJECTION, SOLUTION INTRAVENOUS CONTINUOUS
Status: DISCONTINUED | OUTPATIENT
Start: 2018-10-08 | End: 2018-10-08 | Stop reason: CLARIF

## 2018-10-08 RX ORDER — LANOLIN ALCOHOL/MO/W.PET/CERES
3-6 CREAM (GRAM) TOPICAL
Status: DISCONTINUED | OUTPATIENT
Start: 2018-10-09 | End: 2018-10-10 | Stop reason: HOSPADM

## 2018-10-08 RX ORDER — LIDOCAINE 40 MG/G
CREAM TOPICAL
Status: DISCONTINUED | OUTPATIENT
Start: 2018-10-08 | End: 2018-10-08

## 2018-10-08 RX ORDER — HYDROMORPHONE HYDROCHLORIDE 1 MG/ML
.3-.5 INJECTION, SOLUTION INTRAMUSCULAR; INTRAVENOUS; SUBCUTANEOUS
Status: DISCONTINUED | OUTPATIENT
Start: 2018-10-08 | End: 2018-10-10 | Stop reason: HOSPADM

## 2018-10-08 RX ORDER — OXYCODONE HCL 10 MG/1
10 TABLET, FILM COATED, EXTENDED RELEASE ORAL ONCE
Status: COMPLETED | OUTPATIENT
Start: 2018-10-08 | End: 2018-10-08

## 2018-10-08 RX ORDER — CEFAZOLIN SODIUM 2 G/100ML
2 INJECTION, SOLUTION INTRAVENOUS
Status: COMPLETED | OUTPATIENT
Start: 2018-10-08 | End: 2018-10-08

## 2018-10-08 RX ORDER — GLYCOPYRROLATE 0.2 MG/ML
INJECTION, SOLUTION INTRAMUSCULAR; INTRAVENOUS PRN
Status: DISCONTINUED | OUTPATIENT
Start: 2018-10-08 | End: 2018-10-08

## 2018-10-08 RX ORDER — DIPHENHYDRAMINE HYDROCHLORIDE 50 MG/ML
12.5 INJECTION INTRAMUSCULAR; INTRAVENOUS EVERY 6 HOURS PRN
Status: DISCONTINUED | OUTPATIENT
Start: 2018-10-08 | End: 2018-10-10 | Stop reason: HOSPADM

## 2018-10-08 RX ORDER — AMOXICILLIN 250 MG
2 CAPSULE ORAL 2 TIMES DAILY
Status: DISCONTINUED | OUTPATIENT
Start: 2018-10-08 | End: 2018-10-10 | Stop reason: HOSPADM

## 2018-10-08 RX ORDER — NALOXONE HYDROCHLORIDE 0.4 MG/ML
.1-.4 INJECTION, SOLUTION INTRAMUSCULAR; INTRAVENOUS; SUBCUTANEOUS
Status: DISCONTINUED | OUTPATIENT
Start: 2018-10-08 | End: 2018-10-10 | Stop reason: HOSPADM

## 2018-10-08 RX ORDER — FERROUS GLUCONATE 324(38)MG
324 TABLET ORAL DAILY
Status: DISCONTINUED | OUTPATIENT
Start: 2018-10-08 | End: 2018-10-10 | Stop reason: HOSPADM

## 2018-10-08 RX ORDER — CELECOXIB 200 MG/1
400 CAPSULE ORAL ONCE
Status: COMPLETED | OUTPATIENT
Start: 2018-10-08 | End: 2018-10-08

## 2018-10-08 RX ORDER — TRIAMCINOLONE ACETONIDE 40 MG/ML
INJECTION, SUSPENSION INTRA-ARTICULAR; INTRAMUSCULAR PRN
Status: DISCONTINUED | OUTPATIENT
Start: 2018-10-08 | End: 2018-10-08 | Stop reason: HOSPADM

## 2018-10-08 RX ORDER — ONDANSETRON 2 MG/ML
4 INJECTION INTRAMUSCULAR; INTRAVENOUS EVERY 30 MIN PRN
Status: DISCONTINUED | OUTPATIENT
Start: 2018-10-08 | End: 2018-10-08 | Stop reason: HOSPADM

## 2018-10-08 RX ORDER — NALOXONE HYDROCHLORIDE 0.4 MG/ML
.1-.4 INJECTION, SOLUTION INTRAMUSCULAR; INTRAVENOUS; SUBCUTANEOUS
Status: DISCONTINUED | OUTPATIENT
Start: 2018-10-08 | End: 2018-10-08

## 2018-10-08 RX ORDER — HYDROMORPHONE HYDROCHLORIDE 1 MG/ML
.3-.5 INJECTION, SOLUTION INTRAMUSCULAR; INTRAVENOUS; SUBCUTANEOUS EVERY 5 MIN PRN
Status: DISCONTINUED | OUTPATIENT
Start: 2018-10-08 | End: 2018-10-08 | Stop reason: HOSPADM

## 2018-10-08 RX ORDER — PREGABALIN 100 MG/1
300 CAPSULE ORAL 2 TIMES DAILY
Status: DISCONTINUED | OUTPATIENT
Start: 2018-10-08 | End: 2018-10-10 | Stop reason: HOSPADM

## 2018-10-08 RX ORDER — LABETALOL HYDROCHLORIDE 5 MG/ML
10 INJECTION, SOLUTION INTRAVENOUS
Status: DISCONTINUED | OUTPATIENT
Start: 2018-10-08 | End: 2018-10-08 | Stop reason: HOSPADM

## 2018-10-08 RX ORDER — SODIUM CHLORIDE, SODIUM LACTATE, POTASSIUM CHLORIDE, CALCIUM CHLORIDE 600; 310; 30; 20 MG/100ML; MG/100ML; MG/100ML; MG/100ML
INJECTION, SOLUTION INTRAVENOUS CONTINUOUS
Status: DISCONTINUED | OUTPATIENT
Start: 2018-10-08 | End: 2018-10-08 | Stop reason: HOSPADM

## 2018-10-08 RX ORDER — CEFAZOLIN SODIUM 2 G/100ML
2 INJECTION, SOLUTION INTRAVENOUS EVERY 8 HOURS
Status: COMPLETED | OUTPATIENT
Start: 2018-10-08 | End: 2018-10-08

## 2018-10-08 RX ORDER — DIPHENHYDRAMINE HCL 12.5MG/5ML
12.5 LIQUID (ML) ORAL EVERY 6 HOURS PRN
Status: DISCONTINUED | OUTPATIENT
Start: 2018-10-08 | End: 2018-10-10 | Stop reason: HOSPADM

## 2018-10-08 RX ORDER — LIDOCAINE 40 MG/G
CREAM TOPICAL
Status: DISCONTINUED | OUTPATIENT
Start: 2018-10-08 | End: 2018-10-08 | Stop reason: HOSPADM

## 2018-10-08 RX ORDER — PROPOFOL 10 MG/ML
INJECTION, EMULSION INTRAVENOUS PRN
Status: DISCONTINUED | OUTPATIENT
Start: 2018-10-08 | End: 2018-10-08

## 2018-10-08 RX ORDER — AMOXICILLIN 250 MG
1 CAPSULE ORAL 2 TIMES DAILY
Status: DISCONTINUED | OUTPATIENT
Start: 2018-10-08 | End: 2018-10-10 | Stop reason: HOSPADM

## 2018-10-08 RX ORDER — ACETAMINOPHEN 325 MG/1
975 TABLET ORAL EVERY 8 HOURS
Status: DISCONTINUED | OUTPATIENT
Start: 2018-10-08 | End: 2018-10-10 | Stop reason: HOSPADM

## 2018-10-08 RX ORDER — CEFAZOLIN SODIUM 1 G/3ML
1 INJECTION, POWDER, FOR SOLUTION INTRAMUSCULAR; INTRAVENOUS SEE ADMIN INSTRUCTIONS
Status: DISCONTINUED | OUTPATIENT
Start: 2018-10-08 | End: 2018-10-08 | Stop reason: HOSPADM

## 2018-10-08 RX ORDER — DEXAMETHASONE SODIUM PHOSPHATE 4 MG/ML
INJECTION, SOLUTION INTRA-ARTICULAR; INTRALESIONAL; INTRAMUSCULAR; INTRAVENOUS; SOFT TISSUE PRN
Status: DISCONTINUED | OUTPATIENT
Start: 2018-10-08 | End: 2018-10-08

## 2018-10-08 RX ORDER — FENTANYL CITRATE 50 UG/ML
INJECTION, SOLUTION INTRAMUSCULAR; INTRAVENOUS PRN
Status: DISCONTINUED | OUTPATIENT
Start: 2018-10-08 | End: 2018-10-08

## 2018-10-08 RX ORDER — ACETAMINOPHEN 325 MG/1
650 TABLET ORAL EVERY 4 HOURS PRN
Status: DISCONTINUED | OUTPATIENT
Start: 2018-10-11 | End: 2018-10-10 | Stop reason: HOSPADM

## 2018-10-08 RX ORDER — PROCHLORPERAZINE MALEATE 5 MG
5 TABLET ORAL EVERY 6 HOURS PRN
Status: DISCONTINUED | OUTPATIENT
Start: 2018-10-08 | End: 2018-10-10 | Stop reason: HOSPADM

## 2018-10-08 RX ORDER — ONDANSETRON 2 MG/ML
4 INJECTION INTRAMUSCULAR; INTRAVENOUS EVERY 6 HOURS PRN
Status: DISCONTINUED | OUTPATIENT
Start: 2018-10-08 | End: 2018-10-10 | Stop reason: HOSPADM

## 2018-10-08 RX ORDER — ONDANSETRON 4 MG/1
4 TABLET, ORALLY DISINTEGRATING ORAL EVERY 30 MIN PRN
Status: DISCONTINUED | OUTPATIENT
Start: 2018-10-08 | End: 2018-10-08 | Stop reason: HOSPADM

## 2018-10-08 RX ORDER — BUPIVACAINE HYDROCHLORIDE AND EPINEPHRINE 5; 5 MG/ML; UG/ML
INJECTION, SOLUTION PERINEURAL PRN
Status: DISCONTINUED | OUTPATIENT
Start: 2018-10-08 | End: 2018-10-08

## 2018-10-08 RX ADMIN — CEFAZOLIN SODIUM 2 G: 2 INJECTION, SOLUTION INTRAVENOUS at 22:44

## 2018-10-08 RX ADMIN — FENTANYL CITRATE 100 MCG: 50 INJECTION, SOLUTION INTRAMUSCULAR; INTRAVENOUS at 07:27

## 2018-10-08 RX ADMIN — SODIUM CHLORIDE, POTASSIUM CHLORIDE, SODIUM LACTATE AND CALCIUM CHLORIDE: 600; 310; 30; 20 INJECTION, SOLUTION INTRAVENOUS at 07:21

## 2018-10-08 RX ADMIN — CELECOXIB 400 MG: 200 CAPSULE ORAL at 06:40

## 2018-10-08 RX ADMIN — SODIUM CHLORIDE, POTASSIUM CHLORIDE, SODIUM LACTATE AND CALCIUM CHLORIDE: 600; 310; 30; 20 INJECTION, SOLUTION INTRAVENOUS at 08:26

## 2018-10-08 RX ADMIN — ONDANSETRON 4 MG: 2 INJECTION INTRAMUSCULAR; INTRAVENOUS at 08:41

## 2018-10-08 RX ADMIN — CEFAZOLIN SODIUM 2 G: 2 INJECTION, SOLUTION INTRAVENOUS at 14:48

## 2018-10-08 RX ADMIN — ACETAMINOPHEN 975 MG: 325 TABLET, FILM COATED ORAL at 14:38

## 2018-10-08 RX ADMIN — OXYCODONE HYDROCHLORIDE 5 MG: 5 TABLET ORAL at 11:26

## 2018-10-08 RX ADMIN — FENTANYL CITRATE 50 MCG: 50 INJECTION INTRAMUSCULAR; INTRAVENOUS at 09:07

## 2018-10-08 RX ADMIN — FERROUS GLUCONATE 324 MG: 324 TABLET ORAL at 11:21

## 2018-10-08 RX ADMIN — MIDAZOLAM 2 MG: 1 INJECTION INTRAMUSCULAR; INTRAVENOUS at 07:10

## 2018-10-08 RX ADMIN — PROPOFOL 200 MG: 10 INJECTION, EMULSION INTRAVENOUS at 07:27

## 2018-10-08 RX ADMIN — ASPIRIN 325 MG: 325 TABLET, DELAYED RELEASE ORAL at 19:21

## 2018-10-08 RX ADMIN — SODIUM CHLORIDE, POTASSIUM CHLORIDE, SODIUM LACTATE AND CALCIUM CHLORIDE: 600; 310; 30; 20 INJECTION, SOLUTION INTRAVENOUS at 11:23

## 2018-10-08 RX ADMIN — OXYCODONE HYDROCHLORIDE 5 MG: 5 TABLET ORAL at 15:59

## 2018-10-08 RX ADMIN — LIDOCAINE HYDROCHLORIDE 50 MG: 10 INJECTION, SOLUTION EPIDURAL; INFILTRATION; INTRACAUDAL; PERINEURAL at 07:27

## 2018-10-08 RX ADMIN — HYDROMORPHONE HYDROCHLORIDE 1 MG: 1 INJECTION, SOLUTION INTRAMUSCULAR; INTRAVENOUS; SUBCUTANEOUS at 07:42

## 2018-10-08 RX ADMIN — OMEPRAZOLE 20 MG: 20 CAPSULE, DELAYED RELEASE ORAL at 19:21

## 2018-10-08 RX ADMIN — FENTANYL CITRATE 100 MCG: 50 INJECTION, SOLUTION INTRAMUSCULAR; INTRAVENOUS at 07:10

## 2018-10-08 RX ADMIN — BUPIVACAINE HYDROCHLORIDE AND EPINEPHRINE BITARTRATE 30 ML: 5; .005 INJECTION, SOLUTION EPIDURAL; INTRACAUDAL; PERINEURAL at 07:15

## 2018-10-08 RX ADMIN — ASPIRIN 325 MG: 325 TABLET, DELAYED RELEASE ORAL at 11:21

## 2018-10-08 RX ADMIN — CELECOXIB 100 MG: 100 CAPSULE ORAL at 19:21

## 2018-10-08 RX ADMIN — OXYCODONE HYDROCHLORIDE 10 MG: 10 TABLET, FILM COATED, EXTENDED RELEASE ORAL at 06:40

## 2018-10-08 RX ADMIN — SENNOSIDES AND DOCUSATE SODIUM 1 TABLET: 8.6; 5 TABLET ORAL at 11:21

## 2018-10-08 RX ADMIN — GLYCOPYRROLATE 0.2 MG: 0.2 INJECTION, SOLUTION INTRAMUSCULAR; INTRAVENOUS at 07:27

## 2018-10-08 RX ADMIN — FENTANYL CITRATE 50 MCG: 50 INJECTION INTRAMUSCULAR; INTRAVENOUS at 09:46

## 2018-10-08 RX ADMIN — CEFAZOLIN SODIUM 2 G: 2 INJECTION, SOLUTION INTRAVENOUS at 07:21

## 2018-10-08 RX ADMIN — Medication 1 G: at 07:32

## 2018-10-08 RX ADMIN — OXYCODONE HYDROCHLORIDE 5 MG: 5 TABLET ORAL at 20:17

## 2018-10-08 RX ADMIN — DEXAMETHASONE SODIUM PHOSPHATE 4 MG: 4 INJECTION, SOLUTION INTRA-ARTICULAR; INTRALESIONAL; INTRAMUSCULAR; INTRAVENOUS; SOFT TISSUE at 07:27

## 2018-10-08 RX ADMIN — SENNOSIDES AND DOCUSATE SODIUM 1 TABLET: 8.6; 5 TABLET ORAL at 19:21

## 2018-10-08 RX ADMIN — ACETAMINOPHEN 975 MG: 325 TABLET, FILM COATED ORAL at 22:43

## 2018-10-08 ASSESSMENT — ENCOUNTER SYMPTOMS: DYSRHYTHMIAS: 0

## 2018-10-08 ASSESSMENT — ACTIVITIES OF DAILY LIVING (ADL)
ADLS_ACUITY_SCORE: 10

## 2018-10-08 NOTE — IP AVS SNAPSHOT
MRN:[id number]                      After Visit Summary   10/8/2018    Rojelio Mosqueda    MRN: [id number]           Thank you!     Thank you for choosing [facility] for your care. Our goal is always to provide you with excellent care. Hearing back from our patients is one way we can continue to improve our services. Please take a few minutes to complete the written survey that you may receive in the mail after you visit. If you would like to speak to someone directly about your visit please contact Patient Relations at [phone]. Thank you!          Patient Information     Date Of Birth          1950        Designated Caregiver       Most Recent Value    Caregiver    Will someone help with your care after discharge? yes    Name of designated caregiver Eunice ARRIETA (spouse)    Phone number of caregiver [phone]    Caregiver address same as pt      About your hospital stay     You were admitted on:  October 8, 2018 You last received care in the:  [facility]    You were discharged on:  October 10, 2018        Reason for your hospital stay       Diagnosis: Right DJD knee  Procedure: Right Cemented total knee replacement  Surgeon: Javi Torres MD  Patient underwent total knee replacement without complication. Patient had typical transient post-op anemia. Patient's hospital stay included physical therapy and DVT prophylaxis. After discussion with patient regarding no history of DVT and current high mobility, patient is discharged with ASA for home DVT pphx. Patient will follow -up in the office 10-14days post-op for wound check. Please refer to chart for any other specifics of this hospital stay.  Lynda Patel PA-C                  Who to Call     For medical emergencies, please call [phone].  For non-urgent questions about your medical care, please call your primary care provider or clinic, [phone]  For questions related to your surgery, please call  "your surgery clinic        Attending Provider     Provider Specialty    Javi Torres MD Orthopedics       Primary Care Provider Office Phone # Fax #    Viviane Ann [phone]      After Care Instructions     Activity       Your activity upon discharge: activity as tolerated            Diet       Follow this diet upon discharge: Orders Placed This Encounter      Advance Diet as Tolerated: Regular Diet Adult            Supplies       List the supplies the pt needs to go home: Sharan stockings measured and applied for home use. May remove QHS but should be word during day.            Wound care and dressings       Instructions to care for your wound at home: Keep waterproof dressing in place until post-op visit with Dr Torres. (10-14 days from surgery)                  Follow-up Appointments     Follow-up and recommended labs and tests        Follow-up with Dr Torres team for wound check in 10-14 days. Call for appointment [phone].                  Additional Services     Physical Therapy Referral       *This therapy referral will be filtered to a centralized scheduling office at [facility] and the patient will receive a call to schedule an appointment at a [place] location most convenient for them. *     For [facility] scheduling, Call [phone]  Treatment: Evaluation & Treatment  Special Instructions/Modalities: Physical therapy to be done at [facility]  Call to set up appointment if not already scheduled; [phone]  Special Programs:     Please be aware that coverage of these services is subject to the terms and limitations of your health insurance plan.  Call member services at your health plan with any benefit or coverage questions.      **Note to Provider:  If you are referring outside of [place] for the therapy appointment, please list the name of the location in the \"special instructions\" above, print the referral and " give to the patient to schedule the appointment.                  Further instructions from your care team       Return to clinic in 10-14 days.  Call [phone] to schedule or if you experience any problems before your scheduled appointment.      See general discharge instruction sheet for knees  1. Do exercises at home as instructed by therapist twice a day. Continue outpatient therapy as ordered by your doctor.  2. Ice knee after exercises and therapy.  3. Examine dressing daily for problems.  4. May shower, can get dressing wet, no soaking (no bathtubs, pools or hot tubs)  5. Notify your dentist or physician of your implant so you can get antibiotics before any dental work or before any invasive procedure (ie: colonoscopy)  6. Remove anti-embolism stockings (Sharan hose) for 30 minutes twice daily.      Aquacel dressing:  DO NOT CHANGE DRESSING DAILY.  Leave this dressing on until follow-up appointment with Orthopedic Surgeon.  Dressing is waterproof, can shower with it on, pat dry when done.  No soaking such as in tub baths, pools or hot tubs        While on narcotic pain medication, to prevent constipation:  1. Drink plenty of water to keep well hydrated   2. May take over the counter Colace or Senna (follow instructions on label)        Call your physician if: ([phone])   1. Persistent fever greater than 100 degrees with body chills or excessive sweating.  2. Increased/persistent redness, localized warmth, tenderness, drainage or swelling at incision site. Greater than 50% drainage on dressing.   3. Persistent pain not controlled with oral pain medications, ice and rest.   4. No bowel movement in 3 days (may use Milk of Magnesia or other over the counter remedy).  5. Chest pain, shortness of breath, and/or calf pain with excessive swelling.  6. Generalized feeling of illness.  7. Any other questions or concerns related to your surgery/recovery.    Thank you for allowing [facility] to  "participate in your cares!!          Pending Results     No orders found from 10/6/2018 to 10/9/2018.            Statement of Approval     Ordered          10/10/18 0617  I have reviewed and agree with all the recommendations and orders detailed in this document.  EFFECTIVE NOW     Approved and electronically signed by:  Lynda Patel PA-C             Admission Information     Date & Time Provider Department Dept. Phone    10/8/2018 Javi Torres MD [facility] [phone]      Your Vitals Were     Blood Pressure Pulse Temperature Respirations Height Weight    119/72 (BP Location: Left arm) 54 98.1  F (36.7  C) (Oral) 16 1.88 m (6' 2\") 116.6 kg (257 lb)    Pulse Oximetry BMI (Body Mass Index)                94% 33 kg/m2          MyChart Information     [email or web address] gives you secure access to your electronic health record. If you see a primary care provider, you can also send messages to your care team and make appointments. If you have questions, please call your primary care clinic.  If you do not have a primary care provider, please call [phone] and they will assist you.        Care EveryWhere ID     This is your Care EveryWhere ID. This could be used by other organizations to access your [facility] medical records  [id number]        Equal Access to Services     CATRINA PRAKASH : Renee sanchezo Sojaydaali, waaxda luqadaha, qaybta kaalmada adeegyada, lyly mejia. So [facility] [phone].    ATENCIÓN: Si habla español, tiene a merida disposición servicios gratuitos de asistencia lingüística. Llame al [phone].    We comply with applicable federal civil rights laws and [place] laws. We do not discriminate on the basis of race, color, national origin, age, disability, sex, sexual orientation, or gender identity.               Review of your medicines      UNREVIEWED medicines. Ask your doctor about these medicines        Dose / Directions    acetic " acid-hydrocortisone otic solution   Commonly known as:  VOSOL-HC   Used for:  Infective otitis externa, right        Dose:  4 drop   Place 4 drops into both ears 2 times daily   Quantity:  10 mL   Refills:  0       ACIDOPHILUS LACTOBACILLUS PO        Take by mouth daily Takes two tabs   Refills:  0       allopurinol 100 MG tablet   Commonly known as:  ZYLOPRIM   Used for:  Idiopathic gout, unspecified chronicity, unspecified site        Dose:  200 mg   Take 2 tablets (200 mg) by mouth daily   Quantity:  180 tablet   Refills:  3       dextromethorphan 15 MG/5ML syrup        Dose:  10 mL   Take 10 mLs by mouth as needed for cough   Refills:  0       levothyroxine 100 MCG tablet   Commonly known as:  SYNTHROID/LEVOTHROID   Used for:  Acquired hypothyroidism        TAKE 1 TABLET (100 MCG) BY MOUTH DAILY   Quantity:  90 tablet   Refills:  1       mometasone 50 MCG/ACT spray   Commonly known as:  NASONEX   Used for:  Chronic allergic rhinitis, unspecified seasonality, unspecified trigger        Dose:  2 spray   Spray 2 sprays into both nostrils daily   Quantity:  3 Box   Refills:  3       mupirocin 2 % nasal ointment   Commonly known as:  BACTROBAN        Dose:  1 g   Apply 1 g into each nare 2 times daily BID for 7 days prior to surgery   Refills:  0       NONFORMULARY        Yang-remidy one daily   Refills:  0       NONFORMULARY        A-reds2 one daily   Refills:  0       NONFORMULARY        L-Glutamine 1 tab at bedtime   Refills:  0       NONFORMULARY   Indication:  alpha lipoid acid        Dose:  2 tablet   Take 2 tablets by mouth daily   Refills:  0       NONFORMULARY   Indication:  benvotiamine        Dose:  2 tablet   Take 2 tablets by mouth daily   Refills:  0       omeprazole 20 MG CR capsule   Commonly known as:  priLOSEC   This may have changed:  how much to take   Used for:  Gastroesophageal reflux disease without esophagitis        Dose:  20 mg   Take 1 capsule (20 mg) by mouth daily   Quantity:  90 capsule    Refills:  3       pregabalin 150 MG capsule   Commonly known as:  LYRICA   Used for:  Neuropathy        Dose:  300 mg   Take 2 capsules (300 mg) by mouth 2 times daily   Quantity:  360 capsule   Refills:  3       tadalafil 20 MG tablet   Commonly known as:  CIALIS   Used for:  Impotence of organic origin        Dose:  20 mg   Take 1 tablet (20 mg) by mouth daily as needed   Quantity:  20 tablet   Refills:  prn       Vitamin D-3 5000 units Tabs        Dose:  01735 Units   Take 10,000 Units by mouth 2 times daily   Refills:  0         START taking        Dose / Directions    acetaminophen 325 MG tablet   Commonly known as:  TYLENOL        Dose:  975 mg   Take 3 tablets (975 mg) by mouth every 8 hours   Quantity:  200 tablet   Refills:  0       aspirin 325 MG EC tablet        Dose:  325 mg   Take 1 tablet (325 mg) by mouth 2 times daily   Quantity:  60 tablet   Refills:  0       order for DME        Equipment being ordered: Walker Wheels () and Walker () Treatment Diagnosis: Impaired gait   Quantity:  1 each   Refills:  0       oxyCODONE IR 5 MG tablet   Commonly known as:  ROXICODONE        1 tab po q 4 hrs prn pain scale 3-6,  2 tabs po q 4hrs prn pain scale 7-10   Quantity:  55 tablet   Refills:  0       senna-docusate 8.6-50 MG per tablet   Commonly known as:  SENOKOT-S;PERICOLACE        Dose:  1 tablet   Take 1 tablet by mouth 2 times daily as needed for constipation   Quantity:  100 tablet   Refills:  0            Where to get your medicines      These medications were sent to [facility]/pharmacy [facility][id number] - [place], [place] [facility] [address]  [address] [address]     Phone:  [phone]     omeprazole 20 MG CR capsule         These medications were sent to [place], [place] - [address] [facility]  [address] [address] [address]     Phone:  [phone]     acetaminophen 325 MG tablet    aspirin 325 MG EC tablet    senna-docusate 8.6-50 MG per  tablet         Some of these will need a paper prescription and others can be bought over the counter. Ask your nurse if you have questions.     Bring a paper prescription for each of these medications     order for DME    oxyCODONE IR 5 MG tablet                Protect others around you: Learn how to safely use, store and throw away your medicines at www.disposemymeds.org.        Information about OPIOIDS     PRESCRIPTION OPIOIDS: WHAT YOU NEED TO KNOW   We gave you an opioid (narcotic) pain medicine. It is important to manage your pain, but opioids are not always the best choice. You should first try all the other options your care team gave you. Take this medicine for as short a time (and as few doses) as possible.    Some activities can increase your pain, such as bandage changes or therapy sessions. It may help to take your pain medicine 30 to 60 minutes before these activities. Reduce your stress by getting enough sleep, working on hobbies you enjoy and practicing relaxation or meditation. Talk to your care team about ways to manage your pain beyond prescription opioids.    These medicines have risks:    DO NOT drive when on new or higher doses of pain medicine. These medicines can affect your alertness and reaction times, and you could be arrested for driving under the influence (DUI). If you need to use opioids long-term, talk to your care team about driving.    DO NOT operate heavy machinery    DO NOT do any other dangerous activities while taking these medicines.    DO NOT drink any alcohol while taking these medicines.     If the opioid prescribed includes acetaminophen, DO NOT take with any other medicines that contain acetaminophen. Read all labels carefully. Look for the word  acetaminophen  or  Tylenol.  Ask your pharmacist if you have questions or are unsure.    You can get addicted to pain medicines, especially if you have a history of addiction (chemical, alcohol or substance dependence). Talk to  your care team about ways to reduce this risk.    All opioids tend to cause constipation. Drink plenty of water and eat foods that have a lot of fiber, such as fruits, vegetables, prune juice, apple juice and high-fiber cereal. Take a laxative (Miralax, milk of magnesia, Colace, Senna) if you don t move your bowels at least every other day. Other side effects include upset stomach, sleepiness, dizziness, throwing up, tolerance (needing more of the medicine to have the same effect), physical dependence and slowed breathing.    Store your pills in a secure place, locked if possible. We will not replace any lost or stolen medicine. If you don t finish your medicine, please throw away (dispose) as directed by your pharmacist. The [place] Pollution Control Agency has more information about safe disposal: https://www.pca.[facility].[place].us/living-green/managing-unwanted-medications             Medication List: This is a list of all your medications and when to take them. Check marks below indicate your daily home schedule. Keep this list as a reference.      Medications           Morning Afternoon Evening Bedtime As Needed    acetaminophen 325 MG tablet   Commonly known as:  TYLENOL   Take 3 tablets (975 mg) by mouth every 8 hours   Last time this was given:  975 mg on 10/10/2018  5:18 AM                                acetic acid-hydrocortisone otic solution   Commonly known as:  VOSOL-HC   Place 4 drops into both ears 2 times daily                                ACIDOPHILUS LACTOBACILLUS PO   Take by mouth daily Takes two tabs                                allopurinol 100 MG tablet   Commonly known as:  ZYLOPRIM   Take 2 tablets (200 mg) by mouth daily   Last time this was given:  200 mg on 10/10/2018  8:11 AM                                aspirin 325 MG EC tablet   Take 1 tablet (325 mg) by mouth 2 times daily   Last time this was given:  325 mg on 10/10/2018  8:11 AM                                dextromethorphan 15  MG/5ML syrup   Take 10 mLs by mouth as needed for cough                                levothyroxine 100 MCG tablet   Commonly known as:  SYNTHROID/LEVOTHROID   TAKE 1 TABLET (100 MCG) BY MOUTH DAILY   Last time this was given:  100 mcg on 10/10/2018  8:12 AM                                mometasone 50 MCG/ACT spray   Commonly known as:  NASONEX   Spray 2 sprays into both nostrils daily                                mupirocin 2 % nasal ointment   Commonly known as:  BACTROBAN   Apply 1 g into each nare 2 times daily BID for 7 days prior to surgery                                NONFORMULARY   Yang-remidy one daily                                NONFORMULARY   A-reds2 one daily                                NONFORMULARY   L-Glutamine 1 tab at bedtime                                NONFORMULARY   Take 2 tablets by mouth daily                                NONFORMULARY   Take 2 tablets by mouth daily                                omeprazole 20 MG CR capsule   Commonly known as:  priLOSEC   Take 1 capsule (20 mg) by mouth daily   Last time this was given:  20 mg on 10/10/2018  8:11 AM                                order for DME   Equipment being ordered: Walker Wheels () and Walker () Treatment Diagnosis: Impaired gait                                oxyCODONE IR 5 MG tablet   Commonly known as:  ROXICODONE   1 tab po q 4 hrs prn pain scale 3-6,  2 tabs po q 4hrs prn pain scale 7-10   Last time this was given:  10 mg on 10/10/2018  9:11 AM                                pregabalin 150 MG capsule   Commonly known as:  LYRICA   Take 2 capsules (300 mg) by mouth 2 times daily   Last time this was given:  300 mg on 10/10/2018  8:10 AM                                senna-docusate 8.6-50 MG per tablet   Commonly known as:  SENOKOT-S;PERICOLACE   Take 1 tablet by mouth 2 times daily as needed for constipation   Last time this was given:  2 tablets on 10/10/2018  8:12 AM                                tadalafil  20 MG tablet   Commonly known as:  CIALIS   Take 1 tablet (20 mg) by mouth daily as needed                                Vitamin D-3 5000 units Tabs   Take 10,000 Units by mouth 2 times daily

## 2018-10-08 NOTE — ANESTHESIA PREPROCEDURE EVALUATION
PAC NOTE:       ANESTHESIA PRE EVALUATION:  Anesthesia Evaluation     .             ROS/MED HX    ENT/Pulmonary:      (-) asthma, sleep apnea and Other pulmonary disease   Neurologic:     (+)neuropathy - Bilateral Lower Ext Stable,    (-) Other neuro hx and Dementia   Cardiovascular:        (-) hypertension, CAD, CHF, arrhythmias, pulmonary hypertension and dyslipidemia   METS/Exercise Tolerance:     Hematologic:        (-) anemia   Musculoskeletal:   (+) arthritis, , , -       GI/Hepatic:     (+) GERD       Renal/Genitourinary:  - ROS Renal section negative    (-) renal disease   Endo:     (+) thyroid problem .   (-) Type I DM, Type II DM, chronic steroid usage, other endocrine disorder and obesity   Psychiatric:     (+) psychiatric history depression      Infectious Disease:  - neg infectious disease ROS       Malignancy:      - no malignancy   Other:                     Physical Exam      Airway   Mallampati: II  TM distance: >3 FB  Neck ROM: full    Dental     Cardiovascular   Rhythm and rate: regular and normal  (-) no murmur    Pulmonary    breath sounds clear to auscultation    Other findings: Lab Test        10/01/18     09/09/16     08/20/10                       1552          1200          1201          WBC          3.9*         4.9          7.6           HGB          14.2         14.6         13.7*         MCV          95.3         98.8         91.7          PLT          180          208          328            Lab Test        10/01/18     06/22/18     06/22/17                       1623          0805          0921          NA           138          139          137           POTASSIUM    4.2          4.2          4.9           CHLORIDE     104          107          103           CO2          25           22           28            BUN          24  NOT AP* 21  NOT AP* 20  NOT AP*  CR           0.96         0.94         0.98          ABEBE          9.6          9.2          9.9           GLC          103*          97           103*                 Anesthesia Plan      History & Physical Review  History and physical reviewed and following examination; no interval change.    ASA Status:  2 .    NPO Status:  > 8 hours    Plan for General and Periph. Nerve Block for postop pain with Propofol induction. Maintenance will be Balanced.    PONV prophylaxis:  Ondansetron (or other 5HT-3) and Dexamethasone or Solumedrol       Postoperative Care  Postoperative pain management:  IV analgesics, Oral pain medications and Peripheral nerve block (Single Shot).      Consents  Anesthetic plan, risks, benefits and alternatives discussed with:  Patient.  Use of blood products discussed: Yes.   Use of blood products discussed with Patient.  Consented to blood products.  .                            .

## 2018-10-08 NOTE — ANESTHESIA PROCEDURE NOTES
Peripheral nerve/Neuraxial procedure note : Saphenous  Pre-Procedure  Performed by LULU YARBROUGH  Referred by SULAIMAN  Location: pre-op      Pre-Anesthestic Checklist: patient identified, IV checked, site marked, risks and benefits discussed, informed consent, monitors and equipment checked, pre-op evaluation, at physician/surgeon's request and post-op pain management    Timeout  Correct Patient: Yes   Correct Procedure: Yes   Correct Site: Yes   Correct Laterality: Yes   Correct Position: Yes   Site Marked: Yes   .   Procedure Documentation    .    Procedure:  right  Saphenous.     Ultrasound used to identify targeted nerve, plexus, or vascular marker and placed a needle adjacent to it., Ultrasound was used to visualize the spread of the anesthetic in close proximity to the above stated nerve.   Patient Prep;mask, sterile gloves, povidone-iodine 7.5% surgical scrub.  .  Needle: short bevel, insulated Needle Gauge: 20.    Needle Length (Inches) 2  Insertion Method: Single Shot.       Assessment/Narrative  Paresthesias: No.  Injection made incrementally with aspirations every 5 mL..  The placement was negative for: blood aspirated, painful injection and site bleeding.  Bolus given via needle..   Secured via.   Complications: none. Test dose of mL at. Test dose negative for signs of intravascular, subdural or intrathecal injection. Comments:  The surgeon has given a verbal order transferring care of this patient to me for the performance of a regional analgesia block for post-op pain control. It is requested of me because I am uniquely trained and qualified to perform this block and the surgeon is neither trained nor qualified to perform this procedure.

## 2018-10-08 NOTE — TELEPHONE ENCOUNTER
Message from [employer]:  Miguel YAYO Mosqueda would like a refill of the following medications:  omeprazole (PRILOSEC) 20 MG CR capsule [Viviane Ann MD]    Preferred pharmacy: [facility]/PHARMACY #[id number] - [place], [place] - [address]    Comment:  Please fill my omeprazole prescription, I am having surgery Monday at [facility], and my wife is going to stay in the [place] area until Tuesday so could you please fill this at the [facility] in [place] , on [address] ASAP Thank you Miguel

## 2018-10-08 NOTE — PLAN OF CARE
Problem: Patient Care Overview  Goal: Plan of Care/Patient Progress Review  Pt arrived to the floor at 10:00.  Pt is alert and oriented.  Pt denies pain.  Pt has a abraham and hemovac.  Pt was settled ice applied to knee . Spouse is at bedside.  Pt is taking po meds for pain and he is tolerating reg diet.  pts abraham cath was removed at 14:30. Pt is due to void.

## 2018-10-08 NOTE — TELEPHONE ENCOUNTER
Pending Prescriptions:                       Disp   Refills    omeprazole (PRILOSEC) 20 MG CR capsule    90 cap*             Sig: Take 1 capsule (20 mg) by mouth daily    BJS please review my chart message below    Change qty, Fax and send my chart message back to pt.  Last refill for this med was 6-  Eves  [phone] (home) none (work)

## 2018-10-08 NOTE — PROVIDER NOTIFICATION
Web based paged: Hospitalist not yet assigned. Pt. would like Lyrica and Omeprazole restarted for tonight and then AM. Thanks!

## 2018-10-08 NOTE — ANESTHESIA POSTPROCEDURE EVALUATION
Patient: Rojelio Mosqueda    Procedure(s):  Right total knee arthroplasty  Spinal with adductor - Wound Class: I-Clean    Diagnosis:DJD  Diagnosis Additional Information: PREOPERATIVE DIAGNOSIS:  Osteoarthritis, bilateral knees.       POSTOPERATIVE DIAGNOSIS:  Osteoarthritis, bilateral knees.       PROCEDURES:   1.  Right total knee arthroplasty.   2.  Intra-articular corticosteroid injection, left knee.         Anesthesia Type:  General, Periph. Nerve Block for postop pain    Note:  Anesthesia Post Evaluation    Patient location during evaluation: Phase 2  Patient participation: Able to fully participate in evaluation  Level of consciousness: awake  Pain management: adequate  Airway patency: patent  Cardiovascular status: acceptable  Respiratory status: acceptable  Hydration status: euvolemic  PONV: controlled     Anesthetic complications: None          Last vitals:  Vitals:    10/08/18 0930 10/08/18 1019 10/08/18 1042   BP:  120/83 135/79   Pulse:      Resp:  18 18   Temp: 97.1  F (36.2  C) 96.1  F (35.6  C)    SpO2: 98%           Electronically Signed By: Elliott Patterson MD  October 8, 2018  2:12 PM

## 2018-10-08 NOTE — PROGRESS NOTES
10/08/18 1500   Quick Adds   Type of Visit Initial PT Evaluation   Living Environment   Lives With spouse   Living Arrangements condominium   Home Accessibility (Has a tub shower and walk in shower)   Number of Stairs to Enter Home 0   Number of Stairs Within Home 0   Transportation Available car;family or friend will provide   Living Environment Comment [place] in good health and planning to assist as needed on discharge.    Self-Care   Usual Activity Tolerance good   Current Activity Tolerance fair   Equipment Currently Used at Home shower chair   Functional Level Prior   Ambulation 0-->independent   Transferring 0-->independent   Toileting 0-->independent   Bathing 0-->independent   Fall history within last six months no   General Information   Onset of Illness/Injury or Date of Surgery - Date 10/08/18   Referring Physician Brian KUMARI   Patient/Family Goals Statement Return home with OP PT   Pertinent History of Current Problem (include personal factors and/or comorbidities that impact the POC) 67 year old male s/p R TKA.    Precautions/Limitations fall precautions   Weight-Bearing Status - RLE weight-bearing as tolerated   General Info Comments KI until SLR   Cognitive Status Examination   Orientation orientation to person, place and time   Level of Consciousness alert   Follows Commands and Answers Questions 100% of the time   Pain Assessment   Patient Currently in Pain Yes, see Vital Sign flowsheet  (4/10 R knee)   Integumentary/Edema   Integumentary/Edema Comments Dressing to R LE clean, dry and intact   Range of Motion (ROM)   ROM Comment Decreased R knee AROM secondary to pain, weakness.    Strength   Strength Comments Able to progress to mod I with SLR in the R LE.    Bed Mobility   Bed Mobility Comments Supine to sit with SBA   Transfer Skills   Transfer Comments Sit to/from stand with CGA   Gait   Gait Comments Ambulates 5' with FWW and CGA   Balance   Balance Comments Requires bilat UE support on FWW  "  Sensory Examination   Sensory Perception Comments Reports baseline bilat foot burning (due to neuropathy).    Modality Interventions   Planned Modality Interventions Cryotherapy   Planned Modality Interventions Comments PRN   General Therapy Interventions   Planned Therapy Interventions bed mobility training;gait training;ROM;strengthening;transfer training;home program guidelines;progressive activity/exercise   Clinical Impression   Criteria for Skilled Therapeutic Intervention yes, treatment indicated   PT Diagnosis Impaired gait   Influenced by the following impairments Pain, decreased R LE AROM/strength, impaired balance   Functional limitations due to impairments Decreased IND with functional mobility   Clinical Presentation Stable/Uncomplicated   Clinical Presentation Rationale Stable   Clinical Decision Making (Complexity) Low complexity   Therapy Frequency` 2 times/day   Predicted Duration of Therapy Intervention (days/wks) 3 days   Anticipated Equipment Needs at Discharge front wheeled walker  (DME order entered)   Anticipated Discharge Disposition Home with Outpatient Therapy   Risk & Benefits of therapy have been explained Yes   Patient, Family & other staff in agreement with plan of care Yes   [facility] TM \"6 Clicks\"   2016, Trustees of [employer], under license to [employer].  All rights reserved.   6 Clicks Short Forms Basic Mobility Inpatient Short Form   [facility]  \"6 Clicks\" V.2 Basic Mobility Inpatient Short Form   1. Turning from your back to your side while in a flat bed without using bedrails? 4 - None   2. Moving from lying on your back to sitting on the side of a flat bed without using bedrails? 3 - A Little   3. Moving to and from a bed to a chair (including a wheelchair)? 3 - A Little   4. Standing up from a chair using your arms (e.g., wheelchair, or bedside chair)? 3 - A Little   5. To walk in hospital room? 3 - A Little   6. Climbing 3-5 steps with " a railing? 2 - A Lot   Basic Mobility Raw Score (Score out of 24.Lower scores equate to lower levels of function) 18   Total Evaluation Time   Total Evaluation Time (Minutes) 9

## 2018-10-08 NOTE — PLAN OF CARE
Problem: Patient Care Overview  Goal: Plan of Care/Patient Progress Review  PT: Orders received, evaluation completed and treatment initiated. 67 year old male s/p R TKA. Patient reports IND with mobility at baseline. Lives in a condo with no stairs to manage, but he does perform typically. Spouse in good health and can assist. DME order entered for FWW.     Discharge Planner PT   Patient plan for discharge: Per BPCI plan, home with OP PT  Current status: Supine to sit with SBA. Sit to/from stand with CGA. Ambulates 20' with FWW and CGA. Min A for first R LE SLR, able to progress to IND. R knee AROM ~10-60 degrees.   Barriers to return to prior living situation: None anticipated  Recommendations for discharge: Agree with BPCI plan. Anticipate home with OP PT.   Rationale for recommendations: Patient moving well. Anticipate continued improvement. Will continue IP PT to address R LE AROM/strength deficits to maximize independence with mobility prior to discharge.        Entered by: Heriberto Terry 10/08/2018 4:45 PM

## 2018-10-08 NOTE — OP NOTE
Procedure Date: 10/08/2018      DATE OF SERVICE: 10/08/2018.      PREOPERATIVE DIAGNOSIS:  Osteoarthritis, bilateral knees.      POSTOPERATIVE DIAGNOSIS:  Osteoarthritis, bilateral knees.      PROCEDURES:   1.  Right total knee arthroplasty.   2.  Intra-articular corticosteroid injection, left knee.      SURGEON:  Javi Torres MD      ASSISTANT:  Lynda Patel PA-C      ANESTHESIA:  General with adductor canal block.      ESTIMATED BLOOD LOSS:  Was 25 mL.      TOURNIQUET TIME:  Approximately 60 minutes.      COMPLICATIONS:  None.      DESCRIPTION OF PROCEDURE:  The patient was taken to the operating room where after administration of antibiotic prophylaxis, tranexamic acid, sterile prep and drape, an intraarticular injection was performed to the left knee and the right knee was prepped and draped in sterile fashion.  An anterior incision was made followed by a medial arthrotomy with a moderate posteromedial soft tissue release and direct medial release.  An intramedullary 5 degree guide was used with anterior referencing at 3 degrees of external rotation which did appear to match the epicondylar axis.  A box cut was made for PCL substitution.  Retractors were carefully placed about the proximal tibia and a cut was made perpendicular to the mechanical axis of the tibia, removing 2-3 mm of medial bone.  Tibial rotation was matched to the junction of the medial and middle thirds of the tubercle.  Under direct vision and protecting the neurovascular structures, posterior osteophytes were removed and a capsular injection was performed.  10 mm was then resected from the undersurface of a 28 mm patella and sized appropriately.      Flexion and extension gaps were rectangular and symmetric.  After copious lavage and drying, Simplex cementing was performed for Hardeep Persona size 10 femur, size G tibia, 12 mm polyethylene insert and a 38 mm patellar button.  Range of motion, balance and tracking were all excellent.   Copious antibiotic and Betadine irrigation was performed followed by layered closure over a deep drain.  There were no complications.         NAVNEET HILARIO MD             D: 10/08/2018   T: 10/08/2018   MT: TONG      Name:     ESTEPHANIA ÁLVAREZ   MRN:      [id number]        Account:        [id number]   :      1950           Procedure Date: 10/08/2018      Document: [id number]

## 2018-10-08 NOTE — IP AVS SNAPSHOT
[facility]    [address]    [facility] [address]    Phone:  [phone]    Fax:  [phone]                                       After Visit Summary   10/8/2018    Rojelio Mosqueda    MRN: [id number]           After Visit Summary Signature Page     I have received my discharge instructions, and my questions have been answered. I have discussed any challenges I see with this plan with the nurse or doctor.    ..........................................................................................................................................  Patient/Patient Representative Signature      ..........................................................................................................................................  Patient Representative Print Name and Relationship to Patient    ..................................................               ................................................  Date                                   Time    ..........................................................................................................................................  Reviewed by Signature/Title    ...................................................              ..............................................  Date                                               Time          22EPIC Rev 08/18

## 2018-10-08 NOTE — ANESTHESIA CARE TRANSFER NOTE
Patient: Rojelio Mosqueda    Procedure(s):  Right total knee arthroplasty  Spinal with adductor - Wound Class: I-Clean    Diagnosis: DJD  Diagnosis Additional Information: No value filed.    Anesthesia Type:   General, Periph. Nerve Block for postop pain     Note:  Airway :Face Mask  Patient transferred to:PACU  Handoff Report: Identifed the Patient, Identified the Reponsible Provider, Reviewed the pertinent medical history, Discussed the surgical course, Reviewed Intra-OP anesthesia mangement and issues during anesthesia, Set expectations for post-procedure period and Allowed opportunity for questions and acknowledgement of understanding      Vitals: (Last set prior to Anesthesia Care Transfer)    CRNA VITALS  10/8/2018 0823 - 10/8/2018 0857      10/8/2018             NIBP: 107/64    Pulse: 89    NIBP Mean: 80    SpO2: 98 %    Resp Rate (observed): 12                Electronically Signed By: KEN Maharaj CRNA  October 8, 2018  8:57 AM

## 2018-10-09 ENCOUNTER — APPOINTMENT (OUTPATIENT)
Dept: PHYSICAL THERAPY | Facility: CLINIC | Age: 68
DRG: 470 | End: 2018-10-09
Attending: ORTHOPAEDIC SURGERY
Payer: MEDICARE

## 2018-10-09 ENCOUNTER — APPOINTMENT (OUTPATIENT)
Dept: OCCUPATIONAL THERAPY | Facility: CLINIC | Age: 68
DRG: 470 | End: 2018-10-09
Attending: ORTHOPAEDIC SURGERY
Payer: MEDICARE

## 2018-10-09 LAB
GLUCOSE SERPL-MCNC: 127 MG/DL (ref 70–99)
HGB BLD-MCNC: 12.2 G/DL (ref 13.3–17.7)

## 2018-10-09 PROCEDURE — 40000133 ZZH STATISTIC OT WARD VISIT

## 2018-10-09 PROCEDURE — A9270 NON-COVERED ITEM OR SERVICE: HCPCS | Mod: GY | Performed by: INTERNAL MEDICINE

## 2018-10-09 PROCEDURE — 25000132 ZZH RX MED GY IP 250 OP 250 PS 637: Mod: GY | Performed by: ORTHOPAEDIC SURGERY

## 2018-10-09 PROCEDURE — 85018 HEMOGLOBIN: CPT | Performed by: ORTHOPAEDIC SURGERY

## 2018-10-09 PROCEDURE — 36415 COLL VENOUS BLD VENIPUNCTURE: CPT | Performed by: ORTHOPAEDIC SURGERY

## 2018-10-09 PROCEDURE — 25000132 ZZH RX MED GY IP 250 OP 250 PS 637: Mod: GY | Performed by: INTERNAL MEDICINE

## 2018-10-09 PROCEDURE — 97165 OT EVAL LOW COMPLEX 30 MIN: CPT | Mod: GO

## 2018-10-09 PROCEDURE — 40000193 ZZH STATISTIC PT WARD VISIT: Performed by: PHYSICAL THERAPY ASSISTANT

## 2018-10-09 PROCEDURE — 12000000 ZZH R&B MED SURG/OB

## 2018-10-09 PROCEDURE — 99207 ZZC CONSULT E&M CHANGED TO INITIAL LEVEL: CPT | Performed by: INTERNAL MEDICINE

## 2018-10-09 PROCEDURE — 97116 GAIT TRAINING THERAPY: CPT | Mod: GP | Performed by: PHYSICAL THERAPY ASSISTANT

## 2018-10-09 PROCEDURE — 99221 1ST HOSP IP/OBS SF/LOW 40: CPT | Performed by: INTERNAL MEDICINE

## 2018-10-09 PROCEDURE — A9270 NON-COVERED ITEM OR SERVICE: HCPCS | Mod: GY | Performed by: PHYSICIAN ASSISTANT

## 2018-10-09 PROCEDURE — 97110 THERAPEUTIC EXERCISES: CPT | Mod: GP | Performed by: PHYSICAL THERAPY ASSISTANT

## 2018-10-09 PROCEDURE — 25000132 ZZH RX MED GY IP 250 OP 250 PS 637: Mod: GY | Performed by: PHYSICIAN ASSISTANT

## 2018-10-09 PROCEDURE — 97530 THERAPEUTIC ACTIVITIES: CPT | Mod: GP | Performed by: PHYSICAL THERAPY ASSISTANT

## 2018-10-09 PROCEDURE — 97530 THERAPEUTIC ACTIVITIES: CPT | Mod: GO

## 2018-10-09 PROCEDURE — A9270 NON-COVERED ITEM OR SERVICE: HCPCS | Mod: GY | Performed by: ORTHOPAEDIC SURGERY

## 2018-10-09 PROCEDURE — 82947 ASSAY GLUCOSE BLOOD QUANT: CPT | Performed by: ORTHOPAEDIC SURGERY

## 2018-10-09 PROCEDURE — 97535 SELF CARE MNGMENT TRAINING: CPT | Mod: GO

## 2018-10-09 RX ORDER — OXYCODONE HYDROCHLORIDE 5 MG/1
TABLET ORAL
Qty: 55 TABLET | Refills: 0 | Status: SHIPPED | OUTPATIENT
Start: 2018-10-09 | End: 2018-11-12

## 2018-10-09 RX ORDER — AMOXICILLIN 250 MG
1 CAPSULE ORAL 2 TIMES DAILY PRN
Qty: 100 TABLET | Refills: 0 | Status: SHIPPED | OUTPATIENT
Start: 2018-10-09 | End: 2018-11-12

## 2018-10-09 RX ORDER — FLUTICASONE PROPIONATE 50 MCG
2 SPRAY, SUSPENSION (ML) NASAL DAILY
Status: DISCONTINUED | OUTPATIENT
Start: 2018-10-09 | End: 2018-10-10 | Stop reason: HOSPADM

## 2018-10-09 RX ORDER — ASPIRIN 325 MG
325 TABLET, DELAYED RELEASE (ENTERIC COATED) ORAL 2 TIMES DAILY
Qty: 60 TABLET | Refills: 0 | Status: SHIPPED | OUTPATIENT
Start: 2018-10-10 | End: 2018-11-09

## 2018-10-09 RX ORDER — ALLOPURINOL 100 MG/1
200 TABLET ORAL DAILY
Status: DISCONTINUED | OUTPATIENT
Start: 2018-10-09 | End: 2018-10-10 | Stop reason: HOSPADM

## 2018-10-09 RX ORDER — LEVOTHYROXINE SODIUM 100 UG/1
100 TABLET ORAL DAILY
Status: DISCONTINUED | OUTPATIENT
Start: 2018-10-09 | End: 2018-10-10 | Stop reason: HOSPADM

## 2018-10-09 RX ORDER — ACETAMINOPHEN 325 MG/1
975 TABLET ORAL EVERY 8 HOURS
Qty: 200 TABLET | Refills: 0 | Status: SHIPPED | OUTPATIENT
Start: 2018-10-09 | End: 2019-08-20

## 2018-10-09 RX ADMIN — ACETAMINOPHEN 975 MG: 325 TABLET, FILM COATED ORAL at 06:38

## 2018-10-09 RX ADMIN — ACETAMINOPHEN 975 MG: 325 TABLET, FILM COATED ORAL at 14:43

## 2018-10-09 RX ADMIN — LEVOTHYROXINE SODIUM 100 MCG: 100 TABLET ORAL at 08:44

## 2018-10-09 RX ADMIN — ACETAMINOPHEN 975 MG: 325 TABLET, FILM COATED ORAL at 21:32

## 2018-10-09 RX ADMIN — ASPIRIN 325 MG: 325 TABLET, DELAYED RELEASE ORAL at 20:09

## 2018-10-09 RX ADMIN — OXYCODONE HYDROCHLORIDE 5 MG: 5 TABLET ORAL at 14:44

## 2018-10-09 RX ADMIN — OXYCODONE HYDROCHLORIDE 5 MG: 5 TABLET ORAL at 10:51

## 2018-10-09 RX ADMIN — OXYCODONE HYDROCHLORIDE 5 MG: 5 TABLET ORAL at 00:20

## 2018-10-09 RX ADMIN — SENNOSIDES AND DOCUSATE SODIUM 2 TABLET: 8.6; 5 TABLET ORAL at 20:09

## 2018-10-09 RX ADMIN — OXYCODONE HYDROCHLORIDE 5 MG: 5 TABLET ORAL at 06:38

## 2018-10-09 RX ADMIN — FERROUS GLUCONATE 324 MG: 324 TABLET ORAL at 08:44

## 2018-10-09 RX ADMIN — PREGABALIN 300 MG: 100 CAPSULE ORAL at 21:32

## 2018-10-09 RX ADMIN — OMEPRAZOLE 20 MG: 20 CAPSULE, DELAYED RELEASE ORAL at 08:44

## 2018-10-09 RX ADMIN — OXYCODONE HYDROCHLORIDE 10 MG: 5 TABLET ORAL at 18:33

## 2018-10-09 RX ADMIN — CELECOXIB 100 MG: 100 CAPSULE ORAL at 20:09

## 2018-10-09 RX ADMIN — ASPIRIN 325 MG: 325 TABLET, DELAYED RELEASE ORAL at 08:44

## 2018-10-09 RX ADMIN — CELECOXIB 100 MG: 100 CAPSULE ORAL at 08:45

## 2018-10-09 RX ADMIN — SENNOSIDES AND DOCUSATE SODIUM 1 TABLET: 8.6; 5 TABLET ORAL at 08:45

## 2018-10-09 ASSESSMENT — ACTIVITIES OF DAILY LIVING (ADL)
ADLS_ACUITY_SCORE: 10
PREVIOUS_RESPONSIBILITIES: HOUSEKEEPING;LAUNDRY;SHOPPING;MEDICATION MANAGEMENT;FINANCES;DRIVING
ADLS_ACUITY_SCORE: 10

## 2018-10-09 NOTE — CONSULTS
Consult Date:  10/09/2018      REQUESTING PHYSICIAN:  Javi Torres MD, for postop medical management from the hospitalist team.      HISTORY OF PRESENT ILLNESS:  The patient is postop day #1 status post right total knee arthroplasty and left knee intraarticular corticosteroid injection under general with adductor canal block with 25 mL estimated blood loss.  Postoperatively, the patient is doing quite well with tolerating an oral diet, off IV fluids.  His indwelling Perez catheter was discontinued yesterday and is voiding without any problems.      PAST MEDICAL HISTORY:   1.  Hypothyroid.   2.  Peripheral neuropathy, idiopathic.   3.  Gastroesophageal reflux disease.   4.  Depression.   5.  Osteoarthritis.      PAST SURGERIES:     1.  Left foot reconstructive surgery.   2.  Hernia repair.      SOCIAL HISTORY:  He is , is retired.  Does not smoke, but drinks 1 alcoholic drink 4 times a week.  He is a FULL CODE.      HOME MEDICATIONS:  Include levothyroxine 100 mcg daily, allopurinol 200 mg daily, acidophilus daily, vitamin D3, Nasonex, Lyrica 300 mg twice a day, Cialis and omeprazole 20 mg daily.      ALLERGIES:  HE IS ALLERGIC TO NO KNOWN MEDICATIONS.      MEDICATIONS:  Here in the hospital include aspirin 325 mg 2 tablets daily, iron gluconate 324 mg daily, Senokot Yadira-Colace 1 tablet 2 times a day.  The rest as in past home medications.      PHYSICAL EXAMINATION:   GENERAL:  Well-built, well-nourished pleasant gentleman in no acute respiratory distress, sitting up in bed having just finished eating his breakfast this morning.   VITAL SIGNS:  Temperature 96.9, pulse 65, respirations 18, blood pressure 104/57, O2 saturation on room air 98%.  He is awake, alert, oriented x 3, in no acute respiratory distress.   LUNGS:  Clear to auscultation with no rhonchi, rales or wheezing heard.   HEART:  Regular S1, S2, with no murmurs, gallops or rubs.   ABDOMEN:  Nondistended, nontender, soft.  Bowel sounds present  with no organomegaly or masses felt.  Bowel sounds present.   EXTREMITIES:  His right leg is wrapped in an Ace to the foot above the knee.  Left has PCDs on.  No pitting edema noted.        DIAGNOSTIC:  Blood sugar 127.  Hemoglobin 12.2.      IMPRESSION AND PLAN:  Mr. Rojelio Álvarez is a 67-year-old gentleman who is postoperative day #1 status post right total knee arthroplasty, left intra-articular steroid injection by Dr. Javi Torres.  Postop care for pain management, activity, DVT prophylaxis as per the primary team.  The patient is on regular full-strength aspirin b.i.d. for DVT prophylaxis.  He is on Colace to prevent constipation from narcotics.  He is encouraged incentive spirometry.   1.  Acute blood loss anemia due to knee surgery:  Hemoglobin preop was 14.2, down to 12.2 this morning with no other obvious bleeding.  He has been started on oral iron, which I agree and will continue to monitor hemoglobin.     2.  Hypothyroid:  Is on levothyroxine, continue prior to admission 100 mcg levothyroxine per day.   3.  Gastroesophageal reflux disease, stable symptoms:  Continue PPI.   4.  History of peripheral neuropathy:  Stable symptoms on Lyrica, continue.   5.  Osteoarthritis:  Status post right total knee arthroplasty and left knee intraarticular steroid injection.   6.  For DVT prophylaxis, the patient is on a regular strength aspirin 325 mg twice a day.      CODE STATUS:  FULL CODE.      DISPOSITION:  Per primary team.      Thank you for the kind consult.  The hospitalist team will continue following the patient in the hospital.         LEILA WHITE MD             D: 10/09/2018   T: 10/09/2018   MT: NTS      Name:     ROJELIO ÁLVAREZ   MRN:      [id number]        Account:       [id number]   :      1950           Consult Date:  10/09/2018      Document: [id number]       cc: Javi Torres MD

## 2018-10-09 NOTE — PROGRESS NOTES
"Orthopedic Surgery  10/9/2018  POD 1    S: Patient voices no unexpected ortho complaints today. Denies chest pain or shortness of breath. Did well overnight.    O: Blood pressure 104/57, pulse 65, temperature 96.9  F (36.1  C), temperature source Oral, resp. rate 18, height 1.88 m (6' 2\"), weight 116.6 kg (257 lb), SpO2 98 %.  Lab Results   Component Value Date    HGB 14.2 10/01/2018     No results found for: INR  I/O last 3 completed shifts:  In: 2150 [P.O.:550; I.V.:1600]  Out: 950 [Urine:875; Drains:50; Blood:25]  Distal extremity CMSI bilaterally.  Calves are negative bilaterally, both soft and nontender.  The dressing is C/D/I.      A: Mr. Mosqueda is doing well status post Procedure(s):  ARTHROPLASTY KNEE.    P: Continue physical therapy. Continue DVT pphx with ASA due to high mobility and low risk factors for DVT. Anticipate discharge to home on POD 2 afternoon/miguel.    Lynda Patel PA-C  [phone]  "

## 2018-10-09 NOTE — PLAN OF CARE
Problem: Patient Care Overview  Goal: Plan of Care/Patient Progress Review  Outcome: Improving  A&O x4. Up w/Ax1 w/walker. RA, sats around 96%. BS+, denies passing flatus. Tolerating regular diet well. Voiding well. CMS: baseline neuropathy both feet. SL. Hemovac removed this morning. Right knee ace wrapped, C/D/I. Pain managed w/PRN oxycodone and scheduled Tylenol.

## 2018-10-09 NOTE — PLAN OF CARE
Problem: Patient Care Overview  Goal: Plan of Care/Patient Progress Review  OT: Order received, chart reviewed, treatment initiated. Pt is a 67 year old male s/p R TKA. Pt lives with spouse in condo, no stairs, elevator access, walk in shower with shower chair, standard height toilet (purchasing a RTS). Pt reports indep in all ADLs, IADLs and mobility tasks with no AD at baseline.     Discharge Planner OT   Patient plan for discharge: Per BPCI plan, home with spouse assist  Current status: Pt completed sit > stand from w/c to FWW with CGA, ambulated to/from BR FWW level with CGA/SBA. Pt educated on safe toilet transfer technique, able to transfer on/off RTS with SBA. Pt completed grooming/hygiene tasks of oral cares in stance with SBA, no LOB noted. Pt educated on compensatory technique for LB dressing, required mod A for donning R sock. Pt and spouse educated on home safety modifications/recommendations, DME/AE (raised toilet seat), safe activities post TKA.   Barriers to return to prior living situation: None anticipated  Recommendations for discharge: Agree with BPCI plan. Anticipate home with spouse assist.  Rationale for recommendations: Anticipate pt will progress to meet goals with skilled OT and return to indep PLOF. Pt has strong support of spouse as needed upon return home.        Entered by: Anne Escobar 10/09/2018 2:43 PM

## 2018-10-09 NOTE — PLAN OF CARE
Problem: Patient Care Overview  Goal: Plan of Care/Patient Progress Review  Outcome: Improving  Pt. up with assist of one using gait belt and walker. Pain managed with PRN oxy 5, scheduled meds, and ice. Hemovac draining. Voiding appropriately. Ace wrap is clean, dry, and intact. Tolerating a regular diet.

## 2018-10-09 NOTE — PLAN OF CARE
Problem: Patient Care Overview  Goal: Plan of Care/Patient Progress Review  Discharge Planner PT   Patient plan for discharge: home  Current status: 1 assist for gait to 75 feet and basic tranfers   Barriers to return to prior living situation: none anticiapted  Recommendations for discharge: Agree with BPCI plan. Anticipate home with OP PT  Rationale for recommendations: anticipate will met goals and return home per PA notes tomorrow       Entered by: Olesya Diaz 10/09/2018 10:36 AM

## 2018-10-09 NOTE — PROGRESS NOTES
10/09/18 1333   Quick Adds   Type of Visit Initial Occupational Therapy Evaluation   Living Environment   Lives With spouse   Living Arrangements condominium   Number of Stairs to Enter Home 0   Number of Stairs Within Home 0   Transportation Available car;family or friend will provide   Living Environment Comment Pt lives with spouse in condo, no stairs, elevator access, walk in shower with shower chair, standard height toilet (purchasing a RTS).    Self-Care   Usual Activity Tolerance good   Current Activity Tolerance moderate   Regular Exercise no   Equipment Currently Used at Home shower chair   Functional Level Prior   Ambulation 0-->independent   Transferring 0-->independent   Toileting 0-->independent   Bathing 0-->independent   Dressing 0-->independent   Eating 0-->independent   Communication 0-->understands/communicates without difficulty   Swallowing 0-->swallows foods/liquids without difficulty   Cognition 0 - no cognition issues reported   Fall history within last six months no   Which of the above functional risks had a recent onset or change? transferring;toileting;bathing;dressing   Prior Functional Level Comment Pt reports indep in all ADLs, IADLs and mobility tasks with no AD at baseline.    General Information   Onset of Illness/Injury or Date of Surgery - Date 10/08/18   Referring Physician Javi Torres MD   Patient/Family Goals Statement Pt's goal is to d/c home   Additional Occupational Profile Info/Pertinent History of Current Problem Per chart: Pt is a 67 year old male s/p R TKA   Precautions/Limitations fall precautions   Weight-Bearing Status - RLE weight-bearing as tolerated   Cognitive Status Examination   Orientation orientation to person, place and time   Level of Consciousness alert   Able to Follow Commands WNL/WFL   Personal Safety (Cognitive) WNL/WFL   Memory intact   Visual Perception   Visual Perception Wears glasses  (driving)   Sensory Examination   Sensory Comments Pt  denies numbness/tingling in BUEs   Pain Assessment   Patient Currently in Pain Yes, see Vital Sign flowsheet  (5/10 in R knee)   Range of Motion (ROM)   ROM Comment WFL   Strength   Strength Comments WFL   Hand Strength   Hand Strength Comments WFL   Coordination   Upper Extremity Coordination No deficits were identified   Bed Mobility Skill: Sit to Supine   Level of [place]: Sit/Supine stand-by assist   Physical Assist/Nonphysical Assist: Sit/Supine 1 person assist   Bed Mobility Skill: Supine to Sit   Level of [place]: Supine/Sit stand-by assist   Physical Assist/Nonphysical Assist: Supine/Sit 1 person assist   Transfer Skills   Transfer Comments Initiated - refer to OT daily notes for details   Transfer Skill: Bed to Chair/Chair to Bed   Level of [place]: Bed to Chair contact guard   Physical Assist/Nonphysical Assist: Bed to Chair 1 person assist   Weight-Bearing Restrictions weight-bearing as tolerated   Assistive Device - Transfer Skill Bed to Chair Chair to Bed Rehab Eval rolling walker   Transfer Skill: Sit to Stand   Level of [place]: Sit/Stand contact guard   Physical Assist/Nonphysical Assist: Sit/Stand 1 person assist   Transfer Skill: Sit to Stand weight-bearing as tolerated   Assistive Device for Transfer: Sit/Stand rolling walker   Toilet Transfer   Toilet Transfer Comments Initiated - refer to OT daily note for details   Transfer Skill: Toilet Transfer   Level of [place]: Toilet stand-by assist   Physical Assist/Nonphysical Assist: Toilet 1 person assist   Weight-Bearing Restrictions: Toilet weight-bearing as tolerated   Assistive Device rolling walker   Balance   Balance Comments CGA/SBA while in stance FWW level   Upper Body Dressing   Level of [place]: Dress Upper Body stand-by assist   Physical Assist/Nonphysical Assist: Dress Upper Body 1 person assist   Lower Body Dressing   Level of [place]: Dress Lower Body minimum assist (75% patients effort)   Physical  "Assist/Nonphysical Assist: Dress Lower Body 1 person assist   Toileting   Level of [place]: Toilet contact guard   Physical Assist/Nonphysical Assist: Toilet 1 person assist   Grooming   Level of [place]: Grooming stand-by assist   Physical Assist/Nonphysical Assist: Grooming 1 person assist   Instrumental Activities of Daily Living (IADL)   Previous Responsibilities housekeeping;laundry;shopping;medication management;finances;driving   IADL Comments Pt has support from spouse as needed upon return home   Activities of Daily Living Analysis   Impairments Contributing to Impaired Activities of Daily Living balance impaired;pain;post surgical precautions;strength decreased   ADL Comments Pt presents to OT below baseline level of functioning in all areas of ADLs, IADLs and mobility tasks   General Therapy Interventions   Planned Therapy Interventions ADL retraining;IADL retraining;transfer training   Clinical Impression   Criteria for Skilled Therapeutic Interventions Met yes, treatment indicated   OT Diagnosis Impaired ADLs, IADLs and mobility tasks   Influenced by the following impairments Decreased activity tolerance, pain, impaired balance, post-surgical precautions   Assessment of Occupational Performance 5 or more Performance Deficits   Identified Performance Deficits Bathing, dressing, grooming, toileting, transfers   Clinical Decision Making (Complexity) Low complexity   Therapy Frequency daily   Predicted Duration of Therapy Intervention (days/wks) 2 days   Anticipated Discharge Disposition Home with Assist   Risks and Benefits of Treatment have been explained. Yes   Patient, Family & other staff in agreement with plan of care Yes   Clinical Impression Comments Pt would benefit from skilled OT to maximize safety and indep in all ADLs, IADLs and mobility tasks due to current deficits impacting function.    [facility] AM-PAC  \"6 Clicks\" Daily Activity Inpatient Short Form   1. Putting on and " taking off regular lower body clothing? 3 - A Little   2. Bathing (including washing, rinsing, drying)? 3 - A Little   3. Toileting, which includes using toilet, bedpan or urinal? 3 - A Little   4. Putting on and taking off regular upper body clothing? 3 - A Little   5. Taking care of personal grooming such as brushing teeth? 3 - A Little   6. Eating meals? 4 - None   Daily Activity Raw Score (Score out of 24.Lower scores equate to lower levels of function) 19   Total Evaluation Time   Total Evaluation Time (Minutes) 10

## 2018-10-10 ENCOUNTER — APPOINTMENT (OUTPATIENT)
Dept: PHYSICAL THERAPY | Facility: CLINIC | Age: 68
DRG: 470 | End: 2018-10-10
Attending: ORTHOPAEDIC SURGERY
Payer: MEDICARE

## 2018-10-10 ENCOUNTER — APPOINTMENT (OUTPATIENT)
Dept: OCCUPATIONAL THERAPY | Facility: CLINIC | Age: 68
DRG: 470 | End: 2018-10-10
Attending: ORTHOPAEDIC SURGERY
Payer: MEDICARE

## 2018-10-10 VITALS
SYSTOLIC BLOOD PRESSURE: 119 MMHG | HEART RATE: 54 BPM | BODY MASS INDEX: 32.98 KG/M2 | RESPIRATION RATE: 16 BRPM | TEMPERATURE: 98.1 F | WEIGHT: 257 LBS | DIASTOLIC BLOOD PRESSURE: 72 MMHG | OXYGEN SATURATION: 94 % | HEIGHT: 74 IN

## 2018-10-10 LAB
GLUCOSE SERPL-MCNC: 104 MG/DL (ref 70–99)
HGB BLD-MCNC: 11.8 G/DL (ref 13.3–17.7)

## 2018-10-10 PROCEDURE — 25000132 ZZH RX MED GY IP 250 OP 250 PS 637: Mod: GY | Performed by: ORTHOPAEDIC SURGERY

## 2018-10-10 PROCEDURE — A9270 NON-COVERED ITEM OR SERVICE: HCPCS | Mod: GY | Performed by: INTERNAL MEDICINE

## 2018-10-10 PROCEDURE — 85018 HEMOGLOBIN: CPT | Performed by: ORTHOPAEDIC SURGERY

## 2018-10-10 PROCEDURE — 40000133 ZZH STATISTIC OT WARD VISIT

## 2018-10-10 PROCEDURE — 40000193 ZZH STATISTIC PT WARD VISIT: Performed by: PHYSICAL THERAPY ASSISTANT

## 2018-10-10 PROCEDURE — A9270 NON-COVERED ITEM OR SERVICE: HCPCS | Mod: GY | Performed by: PHYSICIAN ASSISTANT

## 2018-10-10 PROCEDURE — 25000132 ZZH RX MED GY IP 250 OP 250 PS 637: Mod: GY | Performed by: INTERNAL MEDICINE

## 2018-10-10 PROCEDURE — 82947 ASSAY GLUCOSE BLOOD QUANT: CPT | Performed by: ORTHOPAEDIC SURGERY

## 2018-10-10 PROCEDURE — 97116 GAIT TRAINING THERAPY: CPT | Mod: GP | Performed by: PHYSICAL THERAPY ASSISTANT

## 2018-10-10 PROCEDURE — 97110 THERAPEUTIC EXERCISES: CPT | Mod: GP | Performed by: PHYSICAL THERAPY ASSISTANT

## 2018-10-10 PROCEDURE — A9270 NON-COVERED ITEM OR SERVICE: HCPCS | Mod: GY | Performed by: ORTHOPAEDIC SURGERY

## 2018-10-10 PROCEDURE — 97535 SELF CARE MNGMENT TRAINING: CPT | Mod: GO

## 2018-10-10 PROCEDURE — 97530 THERAPEUTIC ACTIVITIES: CPT | Mod: GO

## 2018-10-10 PROCEDURE — 36415 COLL VENOUS BLD VENIPUNCTURE: CPT | Performed by: ORTHOPAEDIC SURGERY

## 2018-10-10 PROCEDURE — 25000132 ZZH RX MED GY IP 250 OP 250 PS 637: Mod: GY | Performed by: PHYSICIAN ASSISTANT

## 2018-10-10 RX ADMIN — ACETAMINOPHEN 975 MG: 325 TABLET, FILM COATED ORAL at 13:36

## 2018-10-10 RX ADMIN — OMEPRAZOLE 20 MG: 20 CAPSULE, DELAYED RELEASE ORAL at 08:11

## 2018-10-10 RX ADMIN — OXYCODONE HYDROCHLORIDE 10 MG: 5 TABLET ORAL at 13:37

## 2018-10-10 RX ADMIN — SENNOSIDES AND DOCUSATE SODIUM 2 TABLET: 8.6; 5 TABLET ORAL at 08:12

## 2018-10-10 RX ADMIN — ASPIRIN 325 MG: 325 TABLET, DELAYED RELEASE ORAL at 08:11

## 2018-10-10 RX ADMIN — OXYCODONE HYDROCHLORIDE 10 MG: 5 TABLET ORAL at 09:11

## 2018-10-10 RX ADMIN — ACETAMINOPHEN 975 MG: 325 TABLET, FILM COATED ORAL at 05:18

## 2018-10-10 RX ADMIN — FERROUS GLUCONATE 324 MG: 324 TABLET ORAL at 08:12

## 2018-10-10 RX ADMIN — OXYCODONE HYDROCHLORIDE 5 MG: 5 TABLET ORAL at 05:05

## 2018-10-10 RX ADMIN — OXYCODONE HYDROCHLORIDE 10 MG: 5 TABLET ORAL at 00:13

## 2018-10-10 RX ADMIN — CELECOXIB 100 MG: 100 CAPSULE ORAL at 08:11

## 2018-10-10 RX ADMIN — ALLOPURINOL 200 MG: 100 TABLET ORAL at 08:11

## 2018-10-10 RX ADMIN — PREGABALIN 300 MG: 100 CAPSULE ORAL at 08:10

## 2018-10-10 RX ADMIN — LEVOTHYROXINE SODIUM 100 MCG: 100 TABLET ORAL at 08:12

## 2018-10-10 ASSESSMENT — ACTIVITIES OF DAILY LIVING (ADL)
ADLS_ACUITY_SCORE: 10

## 2018-10-10 NOTE — PLAN OF CARE
Problem: Patient Care Overview  Goal: Plan of Care/Patient Progress Review  Discharge Planner PT   Patient plan for discharge: home in about an hour  Current status: ROM 6-94 Mod I bed mobility and basic transfers gait with RW ( issued for home) up and down 4 stairs  Barriers to return to prior living situation: none  Recommendations for discharge:  Agree with BPCI plan. Anticipate home with OP PT per plan established by the PT.    Rationale for recommendations: goals are met recommend to PT for discharge to home later today OP PT starting tomorrow per Miguel.  Issued RW for home use       Entered by: Olesya Diaz 10/10/2018 10:13 AM

## 2018-10-10 NOTE — PLAN OF CARE
Problem: Patient Care Overview  Goal: Plan of Care/Patient Progress Review  Outcome: Improving  A&O x4. Up w/Ax1 w/walker. BS/flatus+. Tolerating regular diet well. Voiding well. CMS: baseline neuropathy burning sensation in both feet. SL. Right knee ace wrapped, C/D/I. Pain managed w/PRN oxycodone and scheduled Tylenol. Plans to discharge home today.

## 2018-10-10 NOTE — PROGRESS NOTES
Reviewed discharge instructions and meds with pt and spouse, no further questions. Pt will discharge  at  11:30.

## 2018-10-10 NOTE — PROGRESS NOTES
"Orthopedic Surgery  10/10/2018  POD 2    S: Patient voices no unexpected ortho complaints today. Denies chest pain or shortness of breath. Did well overnight. Wants to go home by 3pm.    O: Blood pressure 125/75, pulse 54, temperature 96.8  F (36  C), temperature source Oral, resp. rate 16, height 1.88 m (6' 2\"), weight 116.6 kg (257 lb), SpO2 95 %.  Lab Results   Component Value Date    HGB 12.2 10/09/2018     No results found for: INR  I/O last 3 completed shifts:  In: 1475 [P.O.:1475]  Out: 1925 [Urine:1525; Drains:400]  Distal extremity CMSI bilaterally.  Calves are negative bilaterally, both soft and nontender.  The dressing is C/D/I.      A: Mr. Mosqueda is doing well status post Procedure(s):  ARTHROPLASTY KNEE  INJECT STEROID (LOCATION).    P: Continue physical therapy. Continue DVT pphx with ASA due to high mobility and low risk factors for DVT. Anticipate discharge to home today mid afternoon.    Lynda Patel PA-C  [phone]  "

## 2018-10-10 NOTE — PLAN OF CARE
Problem: Patient Care Overview  Goal: Plan of Care/Patient Progress Review  Discharge Planner OT   Patient plan for discharge: Per BPCI plan, home with spouse assist  Current status: Pt completed bed mobility supine > sit EOB with mod I. Pt completed sit > stand from EOB to FWW with SBA, ambulated to/from BR FWW level with SBA. Pt completed grooming/hygiene tasks including oral cares, face washing and light bathing in stance with SBA, no LOB with intermittent UE support. Pt able to thread and don LB dressing items with set up/supervision while seated/standing, independent UB dressing. Ongoing education provided regarding DME/AE recommendations as pt with questions regarding RTS, no further questions/concerns.   Barriers to return to prior living situation: None anticipated  Recommendations for discharge: Agree with [facility] plan - home with spouse assist.  Rationale for recommendations: Pt has progressed to meet all OT goals. Pt with no further questions/concerns from OT standpoint. Planning to discharge home later today with spouse.        Entered by: Anne Escobar 10/10/2018 9:49 AM       Occupational Therapy Discharge Summary    Reason for therapy discharge:    All goals and outcomes met, no further needs identified.    Progress towards therapy goal(s). See goals on Care Plan in [facility] electronic health record for goal details.  Goals met    Therapy recommendation(s):    No further skilled OT needs

## 2018-10-10 NOTE — DISCHARGE INSTRUCTIONS
Return to clinic in 10-14 days.  Call [phone] to schedule or if you experience any problems before your scheduled appointment.      See general discharge instruction sheet for knees  1. Do exercises at home as instructed by therapist twice a day. Continue outpatient therapy as ordered by your doctor.  2. Ice knee after exercises and therapy.  3. Examine dressing daily for problems.  4. May shower, can get dressing wet, no soaking (no bathtubs, pools or hot tubs)  5. Notify your dentist or physician of your implant so you can get antibiotics before any dental work or before any invasive procedure (ie: colonoscopy)  6. Remove anti-embolism stockings (Sharan hose) for 30 minutes twice daily.      Aquacel dressing:  DO NOT CHANGE DRESSING DAILY.  Leave this dressing on until follow-up appointment with Orthopedic Surgeon.  Dressing is waterproof, can shower with it on, pat dry when done.  No soaking such as in tub baths, pools or hot tubs        While on narcotic pain medication, to prevent constipation:  1. Drink plenty of water to keep well hydrated   2. May take over the counter Colace or Senna (follow instructions on label)        Call your physician if: ([phone])   1. Persistent fever greater than 100 degrees with body chills or excessive sweating.  2. Increased/persistent redness, localized warmth, tenderness, drainage or swelling at incision site. Greater than 50% drainage on dressing.   3. Persistent pain not controlled with oral pain medications, ice and rest.   4. No bowel movement in 3 days (may use Milk of Magnesia or other over the counter remedy).  5. Chest pain, shortness of breath, and/or calf pain with excessive swelling.  6. Generalized feeling of illness.  7. Any other questions or concerns related to your surgery/recovery.    Thank you for allowing [facility] to participate in your cares!!

## 2018-10-10 NOTE — PLAN OF CARE
Problem: Patient Care Overview  Goal: Plan of Care/Patient Progress Review  Outcome: Improving  Vital signs stable.  Lungs clear, encouraged inspirometer use.  Bowel sounds hypoactive, voiding.  Dressing intact.  CMS, baseline neuropathy in feet.  Hgb 12.2.  Pain controlled with ice pack application, tylenol and oxycodone.  Ambulated in hallway using walker and sba of 1.  Plan of care reviewed with pt and spouse.

## 2018-10-11 ENCOUNTER — MYC MEDICAL ADVICE (OUTPATIENT)
Dept: FAMILY MEDICINE | Facility: CLINIC | Age: 68
End: 2018-10-11

## 2018-10-11 ENCOUNTER — CARE COORDINATION (OUTPATIENT)
Dept: FAMILY MEDICINE | Facility: CLINIC | Age: 68
End: 2018-10-11

## 2018-10-11 NOTE — PROGRESS NOTES
Care Coordination Assessment    PCP: Viviane Ann    Referral Source:  ED/IP List      Clinical Data: Patient discharged from inpatient at [facility] Total knee replacement 10/10/2018.  Patient discharged home with instructions to follow up with surgeon in 10-12 days    Plan: I will forward to Marissa in clinical support to assess and assist with appropriate follow up

## 2018-10-11 NOTE — TELEPHONE ENCOUNTER
From: Rojelio Mosqueda  To: Viviane Ann MD  Sent: 10/11/2018 8:31 AM CDT  Subject: Question about a normal test result    Should I be concerned about my WBC being a little out of range?

## 2018-10-18 ENCOUNTER — TRANSFERRED RECORDS (OUTPATIENT)
Dept: FAMILY MEDICINE | Facility: CLINIC | Age: 68
End: 2018-10-18

## 2018-10-25 NOTE — DISCHARGE SUMMARY
Diagnosis: right DJD knee  Procedure: right Cemented total knee replacement  Surgeon: Javi Torres MD  Patient underwent total knee replacement without complication. Patient had typical transient post-op anemia. Patient's hospital stay included physical therapy and DVT prophylaxis. After discussion with patient regarding no history of DVT and current high mobility, patient is discharged with ASA for home DVT pphx. Patient will follow -up in the office 10-14days post-op for wound check. Please refer to chart for any other specifics of this hospital stay.  Lynda Patel PA-C

## 2018-10-31 ENCOUNTER — MYC MEDICAL ADVICE (OUTPATIENT)
Dept: FAMILY MEDICINE | Facility: CLINIC | Age: 68
End: 2018-10-31

## 2018-10-31 NOTE — TELEPHONE ENCOUNTER
From: Rojelio Mosqueda  To: Viviane Ann MD  Sent: 10/31/2018 11:49 AM CDT  Subject: Do I need an appointment?    Doctor Marissa,  I am having issues with my back. I know it s probably do to being inactive because of my knee operation.  The knee is progressing better than I had hoped and is not giving me much trouble. I am almost off the pain medication taking one at night. But I am feeling tired all the time and having trouble finding a comfortable position to sit or lay. I have gone to a chiropractor four times, the first time having my back out and she fixed that. She says she thinks the problem is my Psoas muscle being tight but I stretch etc and nothing is permanent. Is there anything that you can think of to help? I think I am tired because of lack of sleep and healing.

## 2018-11-12 ENCOUNTER — OFFICE VISIT (OUTPATIENT)
Dept: FAMILY MEDICINE | Facility: CLINIC | Age: 68
End: 2018-11-12

## 2018-11-12 VITALS
OXYGEN SATURATION: 96 % | WEIGHT: 253 LBS | HEART RATE: 57 BPM | SYSTOLIC BLOOD PRESSURE: 137 MMHG | DIASTOLIC BLOOD PRESSURE: 82 MMHG | BODY MASS INDEX: 32.48 KG/M2 | TEMPERATURE: 97.4 F

## 2018-11-12 DIAGNOSIS — M54.50 ACUTE RIGHT-SIDED LOW BACK PAIN WITHOUT SCIATICA: ICD-10-CM

## 2018-11-12 DIAGNOSIS — E03.9 ACQUIRED HYPOTHYROIDISM: ICD-10-CM

## 2018-11-12 DIAGNOSIS — K21.9 GASTROESOPHAGEAL REFLUX DISEASE, ESOPHAGITIS PRESENCE NOT SPECIFIED: ICD-10-CM

## 2018-11-12 DIAGNOSIS — M1A.9XX0 CHRONIC GOUT WITHOUT TOPHUS, UNSPECIFIED CAUSE, UNSPECIFIED SITE: ICD-10-CM

## 2018-11-12 PROCEDURE — 99214 OFFICE O/P EST MOD 30 MIN: CPT | Performed by: FAMILY MEDICINE

## 2018-11-12 PROCEDURE — 36415 COLL VENOUS BLD VENIPUNCTURE: CPT | Performed by: FAMILY MEDICINE

## 2018-11-12 RX ORDER — CYCLOBENZAPRINE HCL 10 MG
5-10 TABLET ORAL AT BEDTIME
Qty: 15 TABLET | Refills: 1 | Status: SHIPPED | OUTPATIENT
Start: 2018-11-12 | End: 2019-08-01

## 2018-11-12 RX ORDER — OMEPRAZOLE 40 MG/1
40 CAPSULE, DELAYED RELEASE ORAL DAILY
Qty: 90 CAPSULE | Refills: 3 | Status: SHIPPED | OUTPATIENT
Start: 2018-11-12 | End: 2019-10-03

## 2018-11-12 NOTE — PROGRESS NOTES
SUBJECTIVE:   Rojelio Mosqueda is a 67 year old male who presents to clinic today for the following health issues:    Bothered with right sided low back pain since his right knee replacement four weeks ago  He finds that exercise helps his back pain- rides recumbant bike-   Applying heat and ice- (gets in hot tub- feels like it helps)   Seeing PT for knee rehab- she has advised some stretching-    He has been seeing chiro for back adjustment-  Pain is positional- Standing does not hurt- sitting is the worse  History of peripheral neuropathy       Hypothyroidism Follow-up      Since last visit, patient describes the following symptoms: Weight stable, no hair loss, no skin changes, no constipation, no loose stools    Tired before knee surgery      Amount of exercise or physical activity: active since knee surgery    Problems taking medications regularly: No pain medication- taking medication regularly    Medication side effects: none      PROBLEMS TO ADD ON...  Omeprazole- breakthrough symptoms on 20 mg- change back to forty  New RX sent    Problem list and histories reviewed & adjusted, as indicated.  Additional history: as documented    Patient Active Problem List   Diagnosis     ESOPHAGEAL REFLUX/ hiatal hernia     Family history of diabetes mellitus     Impotence of organic origin     Peripheral neuropathy     Elevated blood uric acid level     Hypothyroidism     Health Care Home     Arthritis of knee     S/P total knee arthroplasty     Past Surgical History:   Procedure Laterality Date     ARTHROPLASTY KNEE Right 10/8/2018    Procedure: ARTHROPLASTY KNEE;  1.  Right total knee arthroplasty.   2.  Intra-articular corticosteroid injection, left knee. ;  Surgeon: Javi Torres MD;  Location: [facility] OR     C NONSPECIFIC PROCEDURE  2/2011    upper endos/ mod hiatal hernia/ antireflux tx     COLONOSCOPY  2007    [place] office     HERNIA REPAIR      under the age of [age]     INJECT STEROID (LOCATION) Left  10/8/2018    Procedure: INJECT STEROID (LOCATION);;  Surgeon: Javi Torres MD;  Location: [facility] OR     lt. foot-surgery         Social History   Substance Use Topics     Smoking status: Former Smoker     Packs/day: 1.00     Years: 15.00     Types: Cigarettes     Quit date: 1/1/2004     Smokeless tobacco: Never Used      Comment: Smoked off & on     Alcohol use 1.5 oz/week     3 Standard drinks or equivalent per week      Comment: 2-3 days per week 1-2 drinks per time     Family History   Problem Relation Age of Onset     Diabetes Mother      Cancer No family hx of      HEART DISEASE No family hx of          Current Outpatient Prescriptions   Medication Sig Dispense Refill     acetaminophen (TYLENOL) 325 MG tablet Take 3 tablets (975 mg) by mouth every 8 hours 200 tablet 0     acetic acid-hydrocortisone (VOSOL-HC) otic solution Place 4 drops into both ears 2 times daily 10 mL 0     ACIDOPHILUS LACTOBACILLUS PO Take by mouth daily Takes two tabs       allopurinol (ZYLOPRIM) 100 MG tablet Take 2 tablets (200 mg) by mouth daily 180 tablet 3     Cholecalciferol (VITAMIN D-3) 5000 units TABS Take 10,000 Units by mouth 2 times daily       cyclobenzaprine (FLEXERIL) 10 MG tablet Take 0.5-1 tablets (5-10 mg) by mouth At Bedtime 15 tablet 1     dextromethorphan 15 MG/5ML syrup Take 10 mLs by mouth as needed for cough       levothyroxine (SYNTHROID/LEVOTHROID) 100 MCG tablet TAKE 1 TABLET (100 MCG) BY MOUTH DAILY 90 tablet 1     mometasone (NASONEX) 50 MCG/ACT spray Spray 2 sprays into both nostrils daily 3 Box 3     mupirocin (BACTROBAN) 2 % nasal ointment Apply 1 g into each nare 2 times daily BID for 7 days prior to surgery       NONFORMULARY Take 2 tablets by mouth daily       NONFORMULARY Take 2 tablets by mouth daily       NONFORMULARY Yang-remidy one daily       NONFORMULARY A-reds2 one daily       NONFORMULARY L-Glutamine 1 tab at bedtime       omeprazole (PRILOSEC) 40 MG capsule Take 1 capsule (40 mg) by mouth  daily Take 30-60 minutes before a meal. 90 capsule 3     order for DME Equipment being ordered: Walker Wheels () and Walker ()  Treatment Diagnosis: Impaired gait 1 each 0     pregabalin (LYRICA) 150 MG capsule Take 2 capsules (300 mg) by mouth 2 times daily 360 capsule 3     tadalafil (CIALIS) 20 MG tablet Take 1 tablet (20 mg) by mouth daily as needed 20 tablet prn       Reviewed and updated as needed this visit by clinical staff       Reviewed and updated as needed this visit by Provider         ROS:  CONSTITUTIONAL: NEGATIVE for fever, chills, change in weight  ENT/MOUTH: NEGATIVE for ear, mouth and throat problems  RESP: NEGATIVE for significant cough or SOB  CV: NEGATIVE for chest pain, palpitations or peripheral edema  Recently became aware that his blood pressure has been running higher than what is typical for him.  Bought a wrist cuff today  GI: NEGATIVE for nausea, abdominal pain, heartburn, or change in bowel habits  MUSCULOSKELETAL: POSITIVE  for back pain  Recovering well from knee surgery  No gout attacks-on allopurinol    OBJECTIVE:     /82 (BP Location: Right arm, Patient Position: Sitting, Cuff Size: Adult Large)  Pulse 57  Temp 97.4  F (36.3  C) (Oral)  Wt 114.8 kg (253 lb)  SpO2 96%  BMI 32.48 kg/m2  Body mass index is 32.48 kg/(m^2).   Repeat blood pressure: 132/84  GENERAL: healthy, alert and no distress  NECK: no adenopathy, no asymmetry, masses, or scars and thyroid normal to palpation  RESP: lungs clear to auscultation - no rales, rhonchi or wheezes  CV: regular rate and rhythm, normal S1 S2, no S3 or S4, no murmur, click or rub, no peripheral edema and peripheral pulses strong  MS: right knee incision well healed- no warmth- mild swelling  Normal gait    Diagnostic Test Results:  No results found for this or any previous visit (from the past 24 hour(s)).    ASSESSMENT/PLAN:     (M54.5) Acute right-sided low back pain without sciatica  (primary encounter  "diagnosis)  Comment:    Plan: currently in PT- recommended discussing back pain with his ortho- suspect it is related to his recent knee surgery and change in gait  Trial of muscle relaxant and intermittent heat    (E03.9) Acquired hypothyroidism  Comment: Thyroid lab results are in therapeutic range- levothyroxine refilled at current dose  Plan: levothyroxine (SYNTHROID/LEVOTHROID) 100 MCG         tablet, TSH (QUEST), T4 free, VENOUS         COLLECTION, cyclobenzaprine (FLEXERIL) 10 MG         tablet, omeprazole (PRILOSEC) 40 MG capsule          Refill of omeprazole-HIATAL HERNIA   failed trial of dose reduction to 20 mg- refill as 40 mg daily    BMI:   Estimated body mass index is 32.48 kg/(m^2) as calculated from the following:    Height as of 10/8/18: 1.88 m (6' 2\").    Weight as of this encounter: 114.8 kg (253 lb).   Weight management plan: Discussed healthy diet and exercise guidelines and patient will follow up in 12 months in clinic to re-evaluate.      Recheck thyroid in 12 months    Viviane Ann MD  [facility]          "

## 2018-11-12 NOTE — NURSING NOTE
Patient is here for medication check for blood pressure and thyroid. He is also having some back pain that has been around for the past couple of weeks now and has noticed that his blood pressure readings have been a little higher then normal for him.       Pre-visit Screening:  Immunizations:  Due for pneumonia shot   Colonoscopy:  is up to date  Mammogram: NA  Asthma Action Test/Plan:  DELFINO  PHQ9:  PHQ-2 done today   GAD7:  No concerns  Questioned patient about current smoking habits Pt. quit smoking some time ago.  Ok to leave detailed message on voice mail for today's visit only Yes, phone # [phone]

## 2018-11-12 NOTE — MR AVS SNAPSHOT
After Visit Summary   11/12/2018    Rojelio Mosqueda    MRN: [id number]           Patient Information     Date Of Birth          1950        Visit Information        Provider Department      11/12/2018 5:00 PM Viviane Ann MD [facility]        Today's Diagnoses     Acquired hypothyroidism        Acute right-sided low back pain without sciatica        Gastroesophageal reflux disease, esophagitis presence not specified        Chronic gout without tophus, unspecified cause, unspecified site          Care Instructions    Arm           Follow-ups after your visit        Who to contact     If you have questions or need follow up information about today's clinic visit or your schedule please contact [facility] directly at [phone].  Normal or non-critical lab and imaging results will be communicated to you by [employer], letter or phone within 4 business days after the clinic has received the results. If you do not hear from us within 7 days, please contact the clinic through [employer] or phone. If you have a critical or abnormal lab result, we will notify you by phone as soon as possible.  Submit refill requests through [employer] or call your pharmacy and they will forward the refill request to us. Please allow 3 business days for your refill to be completed.          Additional Information About Your Visit        MyChart Information     Bl[employer] gives you secure access to your electronic health record. If you see a primary care provider, you can also send messages to your care team and make appointments. If you have questions, please call your primary care clinic.  If you do not have a primary care provider, please call [phone] and they will assist you.        Care EveryWhere ID     This is your Care EveryWhere ID. This could be used by other organizations to access your [facility] medical records  [id number]        Your Vitals Were     Pulse  Temperature Pulse Oximetry BMI (Body Mass Index)          57 97.4  F (36.3  C) (Oral) 96% 32.48 kg/m2         Blood Pressure from Last 3 Encounters:   11/12/18 137/82   10/10/18 119/72   10/01/18 114/68    Weight from Last 3 Encounters:   11/12/18 114.8 kg (253 lb)   10/08/18 116.6 kg (257 lb)   10/01/18 117.5 kg (259 lb)              We Performed the Following     T4 free     TSH (QUEST)     VENOUS COLLECTION          Today's Medication Changes          These changes are accurate as of 11/12/18 11:59 PM.  If you have any questions, ask your nurse or doctor.               Start taking these medicines.        Dose/Directions    cyclobenzaprine 10 MG tablet   Commonly known as:  FLEXERIL   Used for:  Acute right-sided low back pain without sciatica   Started by:  Viviane Ann MD        Dose:  5-10 mg   Take 0.5-1 tablets (5-10 mg) by mouth At Bedtime   Quantity:  15 tablet   Refills:  1         These medicines have changed or have updated prescriptions.        Dose/Directions    omeprazole 40 MG capsule   Commonly known as:  priLOSEC   This may have changed:    - medication strength  - how much to take  - additional instructions   Used for:  Gastroesophageal reflux disease, esophagitis presence not specified   Changed by:  Viviane Ann MD        Dose:  40 mg   Take 1 capsule (40 mg) by mouth daily Take 30-60 minutes before a meal.   Quantity:  90 capsule   Refills:  3            Where to get your medicines      These medications were sent to [facility]/pharmacy #[id number] [place] [id number] [place] [address]     Phone:  [phone]     cyclobenzaprine 10 MG tablet    levothyroxine 100 MCG tablet    omeprazole 40 MG capsule                Primary Care Provider Office Phone # Fax #    Viviane Ann [phone] [address] [address]        Equal Access to Services     CATRINA PRAKASH AH: Renee Cornejo,  wasisida luqadaha, qaybta katami alcantar, lyly donnellyaamanish ah. So [facility] [place] [facility] [phone].    ATENCIÓN: Si byron moreno, tiene a merida disposición servicios gratuitos de asistencia lingüística. Erika al [phone].    We comply with applicable federal civil rights laws and [place] laws. We do not discriminate on the basis of race, color, national origin, age, disability, sex, sexual orientation, or gender identity.            Thank you!     Thank you for choosing [facility]  for your care. Our goal is always to provide you with excellent care. Hearing back from our patients is one way we can continue to improve our services. Please take a few minutes to complete the written survey that you may receive in the mail after your visit with us. Thank you!             Your Updated Medication List - Protect others around you: Learn how to safely use, store and throw away your medicines at www.disposemymeds.org.          This list is accurate as of 11/12/18 11:59 PM.  Always use your most recent med list.                   Brand Name Dispense Instructions for use Diagnosis    acetaminophen 325 MG tablet    TYLENOL    200 tablet    Take 3 tablets (975 mg) by mouth every 8 hours    Status post total right knee replacement       acetic acid-hydrocortisone otic solution    VOSOL-HC    10 mL    Place 4 drops into both ears 2 times daily    Infective otitis externa, right       ACIDOPHILUS LACTOBACILLUS PO      Take by mouth daily Takes two tabs        allopurinol 100 MG tablet    ZYLOPRIM    180 tablet    Take 2 tablets (200 mg) by mouth daily    Idiopathic gout, unspecified chronicity, unspecified site       cyclobenzaprine 10 MG tablet    FLEXERIL    15 tablet    Take 0.5-1 tablets (5-10 mg) by mouth At Bedtime    Acute right-sided low back pain without sciatica       dextromethorphan 15 MG/5ML syrup      Take 10 mLs by mouth as needed for cough        levothyroxine 100 MCG tablet     SYNTHROID/LEVOTHROID    90 tablet    TAKE 1 TABLET (100 MCG) BY MOUTH DAILY    Acquired hypothyroidism       mometasone 50 MCG/ACT spray    NASONEX    3 Box    Spray 2 sprays into both nostrils daily    Chronic allergic rhinitis, unspecified seasonality, unspecified trigger       mupirocin 2 % nasal ointment    BACTROBAN     Apply 1 g into each nare 2 times daily BID for 7 days prior to surgery        NONFORMULARY      Yang-remidy one daily        NONFORMULARY      A-reds2 one daily        NONFORMULARY      L-Glutamine 1 tab at bedtime        NONFORMULARY      Take 2 tablets by mouth daily        NONFORMULARY      Take 2 tablets by mouth daily        omeprazole 40 MG capsule    priLOSEC    90 capsule    Take 1 capsule (40 mg) by mouth daily Take 30-60 minutes before a meal.    Gastroesophageal reflux disease, esophagitis presence not specified       order for DME     1 each    Equipment being ordered: Walker Wheels () and Walker () Treatment Diagnosis: Impaired gait    Status post total right knee replacement       pregabalin 150 MG capsule    LYRICA    360 capsule    Take 2 capsules (300 mg) by mouth 2 times daily    Neuropathy       tadalafil 20 MG tablet    CIALIS    20 tablet    Take 1 tablet (20 mg) by mouth daily as needed    Impotence of organic origin       Vitamin D-3 5000 units Tabs      Take 10,000 Units by mouth 2 times daily

## 2018-11-14 LAB
T4, FREE, NON-DIALYSIS - QUEST: 1.1 NG/DL (ref 0.8–1.8)
TSH SERPL-ACNC: 2.92 MIU/L (ref 0.4–4.5)

## 2018-11-15 RX ORDER — LEVOTHYROXINE SODIUM 100 UG/1
TABLET ORAL
Qty: 90 TABLET | Refills: 3 | Status: SHIPPED | OUTPATIENT
Start: 2018-11-15 | End: 2019-10-03

## 2018-11-18 PROBLEM — M1A.9XX0 CHRONIC GOUT WITHOUT TOPHUS, UNSPECIFIED CAUSE, UNSPECIFIED SITE: Status: ACTIVE | Noted: 2018-11-18

## 2018-11-21 ENCOUNTER — TRANSFERRED RECORDS (OUTPATIENT)
Dept: FAMILY MEDICINE | Facility: CLINIC | Age: 68
End: 2018-11-21

## 2019-06-21 ENCOUNTER — MYC REFILL (OUTPATIENT)
Dept: FAMILY MEDICINE | Facility: CLINIC | Age: 69
End: 2019-06-21

## 2019-06-21 DIAGNOSIS — G62.9 NEUROPATHY: ICD-10-CM

## 2019-06-21 RX ORDER — PREGABALIN 150 MG/1
300 CAPSULE ORAL 2 TIMES DAILY
Qty: 360 CAPSULE | Refills: 1 | Status: SHIPPED | OUTPATIENT
Start: 2019-06-21 | End: 2019-10-03

## 2019-06-21 NOTE — TELEPHONE ENCOUNTER
Pending Prescriptions:                       Disp   Refills    pregabalin (LYRICA) 150 MG capsule        360 ca*3            Sig: Take 2 capsules (300 mg) by mouth 2 times daily    This is a my chart refill request notes    Last refill was 6-  Last ov 11- med check rtc in 1year      Pt is asking for a increase in dosage  Please respond to pt on my chart  [phone] (home) none (work)

## 2019-08-01 ENCOUNTER — TELEPHONE (OUTPATIENT)
Dept: FAMILY MEDICINE | Facility: CLINIC | Age: 69
End: 2019-08-01

## 2019-08-01 NOTE — TELEPHONE ENCOUNTER
Neris from [facility] called to ask that we give pt the MRSA swab at his upcoming pre-op on 08/15/19. Just an FYI.    Thanks, Khadijah

## 2019-08-03 NOTE — TELEPHONE ENCOUNTER
Spoke with [facility] and Denisse, we do have the swab, pt will need to take it to [facility] once swabbed. Orders are already in.

## 2019-08-15 ENCOUNTER — OFFICE VISIT (OUTPATIENT)
Dept: FAMILY MEDICINE | Facility: CLINIC | Age: 69
End: 2019-08-15

## 2019-08-15 ENCOUNTER — HOSPITAL ENCOUNTER (OUTPATIENT)
Dept: LAB | Facility: CLINIC | Age: 69
Discharge: HOME | End: 2019-08-15
Attending: ORTHOPAEDIC SURGERY | Admitting: FAMILY MEDICINE
Payer: MEDICARE

## 2019-08-15 VITALS
TEMPERATURE: 98 F | SYSTOLIC BLOOD PRESSURE: 112 MMHG | OXYGEN SATURATION: 98 % | WEIGHT: 263.2 LBS | DIASTOLIC BLOOD PRESSURE: 68 MMHG | BODY MASS INDEX: 33.78 KG/M2 | HEIGHT: 74 IN | HEART RATE: 50 BPM

## 2019-08-15 DIAGNOSIS — Z01.818 PRE-OPERATIVE GENERAL PHYSICAL EXAMINATION: Primary | ICD-10-CM

## 2019-08-15 DIAGNOSIS — Z00.00 PREVENTATIVE HEALTH CARE: Primary | ICD-10-CM

## 2019-08-15 DIAGNOSIS — Z01.818 PRE-OP EXAM: ICD-10-CM

## 2019-08-15 DIAGNOSIS — R00.1 SINUS BRADYCARDIA: ICD-10-CM

## 2019-08-15 DIAGNOSIS — M17.12 PRIMARY OSTEOARTHRITIS OF LEFT KNEE: ICD-10-CM

## 2019-08-15 LAB
BUN SERPL-MCNC: 21 MG/DL (ref 7–25)
BUN/CREATININE RATIO: 21.9 (ref 6–22)
CALCIUM SERPL-MCNC: 9.5 MG/DL (ref 8.6–10.3)
CHLORIDE SERPLBLD-SCNC: 106.8 MMOL/L (ref 98–110)
CO2 SERPL-SCNC: 26.3 MMOL/L (ref 20–32)
CREAT SERPL-MCNC: 0.96 MG/DL (ref 0.7–1.18)
ERYTHROCYTE [DISTWIDTH] IN BLOOD BY AUTOMATED COUNT: 12.8 %
GLUCOSE SERPL-MCNC: 109 MG/DL (ref 60–99)
HCT VFR BLD AUTO: 46.7 % (ref 40–53)
HEMOGLOBIN: 15 G/DL (ref 13.3–17.7)
MCH RBC QN AUTO: 30.8 PG (ref 26–33)
MCHC RBC AUTO-ENTMCNC: 32.1 G/DL (ref 31–36)
MCV RBC AUTO: 95.8 FL (ref 78–100)
MRSA DNA SPEC QL NAA+PROBE: NEGATIVE
PLATELET COUNT - QUEST: 229 10^9/L (ref 150–375)
POTASSIUM SERPL-SCNC: 4.63 MMOL/L (ref 3.5–5.3)
RBC # BLD AUTO: 4.87 10*12/L (ref 4.4–5.9)
SODIUM SERPL-SCNC: 140.8 MMOL/L (ref 135–146)
SPECIMEN SOURCE: NORMAL
WBC # BLD AUTO: 5.7 10*9/L (ref 4–11)

## 2019-08-15 PROCEDURE — 80048 BASIC METABOLIC PNL TOTAL CA: CPT | Performed by: FAMILY MEDICINE

## 2019-08-15 PROCEDURE — 40000829 ZZHCL STATISTIC STAPH AUREUS SUSCEPT SCREEN PCR: Performed by: FAMILY MEDICINE

## 2019-08-15 PROCEDURE — 40000830 ZZHCL STATISTIC STAPH AUREUS METH RESIST SCREEN PCR: Performed by: FAMILY MEDICINE

## 2019-08-15 PROCEDURE — 99214 OFFICE O/P EST MOD 30 MIN: CPT | Performed by: FAMILY MEDICINE

## 2019-08-15 PROCEDURE — 87640 STAPH A DNA AMP PROBE: CPT | Performed by: FAMILY MEDICINE

## 2019-08-15 PROCEDURE — 93000 ELECTROCARDIOGRAM COMPLETE: CPT | Performed by: FAMILY MEDICINE

## 2019-08-15 PROCEDURE — 87641 MR-STAPH DNA AMP PROBE: CPT | Performed by: FAMILY MEDICINE

## 2019-08-15 PROCEDURE — 85027 COMPLETE CBC AUTOMATED: CPT | Performed by: FAMILY MEDICINE

## 2019-08-15 PROCEDURE — 36415 COLL VENOUS BLD VENIPUNCTURE: CPT | Performed by: FAMILY MEDICINE

## 2019-08-15 RX ORDER — ALLOPURINOL 100 MG/1
100 TABLET ORAL DAILY
COMMUNITY
End: 2019-10-03

## 2019-08-15 RX ORDER — MULTIPLE VITAMINS W/ MINERALS TAB 9MG-400MCG
1 TAB ORAL DAILY
COMMUNITY

## 2019-08-15 RX ORDER — CARBOXYMETHYLCELLULOSE SODIUM 5 MG/ML
2 SOLUTION/ DROPS OPHTHALMIC 3 TIMES DAILY PRN
COMMUNITY

## 2019-08-15 ASSESSMENT — PATIENT HEALTH QUESTIONNAIRE - PHQ9: SUM OF ALL RESPONSES TO PHQ QUESTIONS 1-9: 1

## 2019-08-15 ASSESSMENT — MIFFLIN-ST. JEOR: SCORE: 2043.62

## 2019-08-15 NOTE — PROGRESS NOTES
[facility]  [address]  [address]  [facility] [address]  [phone]  Dept: [phone]    PRE-OP EVALUATION:  Today's date: 8/15/2019    Rojelio Mosqueda (: 1950) presents for pre-operative evaluation assessment as requested by Dr. Torres.  He requires evaluation and anesthesia risk assessment prior to undergoing surgery/procedure for treatment of Left Knee Replacement .    Fax number for surgical facility: [phone]  Primary Physician: Viviane Ann  Type of Anesthesia Anticipated: to be determined    Patient has a Health Care Directive or Living Will:  YES -Advanced Care Directive at the Hospital     1. NO - Do you have a history of heart attack, stroke, stent, bypass or surgery on an artery in the head, neck, heart or legs?  2. NO - Do you ever have any pain or discomfort in your chest?  3. NO - Do you have a history of  Heart Failure?  4. NO - Are you troubled by shortness of breath when: walking on the level, up a slight hill or at night?  5. NO - Do you currently have a cold, bronchitis or other respiratory infection?  6. NO - DO YOU HAVE A COUGH, SHORTNESS OF BREATH OR WHEEZING?  (yes, if exposed to cats)  7. NO - Do you sometimes get pains in the calves of your legs when you walk?  8. NO - Do you or anyone in your family have previous history of blood clots?  9. NO - Do you or does anyone in your family have a serious bleeding problem such as prolonged bleeding following surgeries or cuts?  10. NO - Have you ever had problems with anemia or been told to take iron pills?  11. NO - Have you had any abnormal blood loss such as black, tarry or bloody stools, or abnormal vaginal bleeding?  12. NO - Have you ever had a blood transfusion?  13. NO - Have you or any of your relatives ever had problems with anesthesia?  14. NO - Do you have sleep apnea, excessive snoring or daytime drowsiness?  15. NO - Do you have any prosthetic heart valves?  16. YES  - Do you  have prosthetic joints? Right knee  17. NO - Is there any chance that you may be pregnant?    HPI:     HPI related to upcoming procedure: left knee total knee replacement: no longer able to walk stairs without holding onto railings, progressive symptoms over the past ten years  Able to bike, but cannot walk for exercise       MEDICAL HISTORY:     Patient Active Problem List    Diagnosis Date Noted     Chronic gout without tophus, unspecified cause, unspecified site 11/18/2018     Priority: Medium     S/P total knee arthroplasty 10/08/2018     Priority: Medium     Arthritis of knee 10/01/2018     Priority: Medium     Hypothyroidism 05/04/2012     Priority: Medium     Problem list name updated by automated process. Provider to review       Elevated blood uric acid level 10/03/2011     Priority: Medium     (Problem list name updated by automated process. Provider to review and confirm.)       Peripheral neuropathy      Priority: Medium     Impotence of organic origin 01/15/2004     Priority: Medium     ESOPHAGEAL REFLUX/ hiatal hernia      Priority: Medium     Family history of diabetes mellitus      Priority: Medium     Health Care Home 11/29/2012     Priority: Low     State Tier Level:  Tier 1  Status:  NA  Care Coordinator:    See Letters for H Care Plan            Past Medical History:   Diagnosis Date     Arthritis      Depressive disorder, not elsewhere classified 7/21/2002     Esophageal reflux      Family history of diabetes mellitus      GERD (gastroesophageal reflux disease)      Idiopathic neuropathy      Peripheral neuropathy      Thyroid disease      Past Surgical History:   Procedure Laterality Date     ARTHROPLASTY KNEE Right 10/8/2018    Procedure: ARTHROPLASTY KNEE;  1.  Right total knee arthroplasty.   2.  Intra-articular corticosteroid injection, left knee. ;  Surgeon: Javi Torres MD;  Location:  OR     C NONSPECIFIC PROCEDURE  2/2011    upper endos/ mod hiatal hernia/ antireflux tx      COLONOSCOPY  2007    [facility]     HERNIA REPAIR      under the age of [age]     INJECT STEROID (LOCATION) Left 10/8/2018    Procedure: INJECT STEROID (LOCATION);;  Surgeon: Javi Torres MD;  Location: [facility] OR     lt. foot-surgery       Current Outpatient Medications   Medication Sig Dispense Refill     ACIDOPHILUS LACTOBACILLUS PO Take by mouth daily Takes two tabs       allopurinol (ZYLOPRIM) 100 MG tablet Take 2 tablets (200 mg) by mouth daily 180 tablet 3     Cholecalciferol (VITAMIN D-3) 5000 units TABS Take 10,000 Units by mouth 2 times daily       levothyroxine (SYNTHROID/LEVOTHROID) 100 MCG tablet TAKE 1 TABLET (100 MCG) BY MOUTH DAILY 90 tablet 3     mometasone (NASONEX) 50 MCG/ACT spray Spray 2 sprays into both nostrils daily 3 Box 3     NONFORMULARY Yang-remidy one daily       NONFORMULARY A-reds2 one daily       Omega-3 Fatty Acids (OMEGA-3 PO) Take by mouth daily       omeprazole (PRILOSEC) 40 MG capsule Take 1 capsule (40 mg) by mouth daily Take 30-60 minutes before a meal. 90 capsule 3     order for DME Equipment being ordered: Walker Wheels () and Walker ()  Treatment Diagnosis: Impaired gait 1 each 0     pregabalin (LYRICA) 150 MG capsule Take 2 capsules (300 mg) by mouth 2 times daily 360 capsule 1     tadalafil (CIALIS) 20 MG tablet Take 1 tablet (20 mg) by mouth daily as needed 20 tablet prn     acetaminophen (TYLENOL) 325 MG tablet Take 3 tablets (975 mg) by mouth every 8 hours (Patient not taking: Reported on 8/15/2019) 200 tablet 0     OTC products: no recent use of OTC ASA, NSAIDS or Steroids    Stop omega three, vitamin D3, multiple vitamin, Remedy B12, Areds  one week before surgery    Tylenol for headache or pain       Allergies   Allergen Reactions     Cat Hair Extract Shortness Of Breath     Dust Mites Itching     No Known Drug Allergies       Latex Allergy: NO    Social History     Tobacco Use     Smoking status: Former Smoker     Packs/day: 1.00     Years: 15.00  "    Pack years: 15.00     Types: Cigarettes     Last attempt to quit: 1/1/2004     Years since quitting: 15.6     Smokeless tobacco: Never Used     Tobacco comment: Smoked off & on   Substance Use Topics     Alcohol use: Yes     Alcohol/week: 1.5 oz     Types: 3 Standard drinks or equivalent per week     Comment: 2-3 days per week 1-2 drinks per time     History   Drug Use No       REVIEW OF SYSTEMS:   CONSTITUTIONAL: NEGATIVE for fever, chills, change in weight  ENT/MOUTH: NEGATIVE for ear, mouth and throat problems  RESP: NEGATIVE for significant cough or SOB  CV: NEGATIVE for chest pain, palpitations or peripheral edema  GI: NEGATIVE for nausea, abdominal pain, heartburn, or change in bowel habits  : negative for dysuria, hematuria, decreased urinary stream, erectile dysfunction  NEURO: neuropathy of feet and toes  MS: years since last gout attack    EXAM:   /68 (BP Location: Right arm, Patient Position: Sitting, Cuff Size: Adult Large)   Pulse 50   Temp 98  F (36.7  C) (Oral)   Ht 1.88 m (6' 2\")   Wt 119.4 kg (263 lb 3.2 oz)   SpO2 98%   BMI 33.79 kg/m      GENERAL APPEARANCE: healthy, alert and no distress     EYES: EOMI,  PERRL     HENT: ear canals and TM's normal and nose and mouth without ulcers or lesions     NECK: no adenopathy, no asymmetry, masses, or scars and thyroid normal to palpation     RESP: lungs clear to auscultation - no rales, rhonchi or wheezes     CV: regular rates and rhythm, no murmur, click or rub     ABDOMEN:  soft, nontender, no HSM or masses and bowel sounds normal     MS: extremities normal- no gross deformities noted, no evidence of inflammation in joints,      SKIN: no suspicious lesions or rashes     NEURO: Normal strength and tone,  mentation intact and speech normal     PSYCH: mentation appears normal. and affect normal/bright     LYMPHATICS: No cervical adenopathy    DIAGNOSTICS:   EKG: sinus bradycardia   HR 50    Hemoglobin   Date Value Ref Range Status "   08/15/2019 15.0 13.3 - 17.7 g/dL Final   ]  Potassium   Date Value Ref Range Status   08/15/2019 4.63 3.5 - 5.3 mmol/L Final     Lab Results   Component Value Date    CR 0.96 08/15/2019     MRSA nasal swab: negative    Recent Labs   Lab Test 10/10/18  0621 10/09/18  0724 10/01/18  1623 10/01/18  1552 06/22/18  0805  09/09/16  1200   HGB 11.8* 12.2*  --  14.2  --   --  14.6   PLT  --   --   --  180  --   --  208   NA  --   --  138  --  139   < >  --    POTASSIUM  --   --  4.2  --  4.2   < >  --    CR  --   --  0.96  --  0.94   < >  --     < > = values in this interval not displayed.        IMPRESSION:   Reason for surgery/procedure: total knee replacement    The proposed surgical procedure is considered INTERMEDIATE risk.    REVISED CARDIAC RISK INDEX  The patient has the following serious cardiovascular risks for perioperative complications such as (MI, PE, VFib and 3  AV Block):  No serious cardiac risks  INTERPRETATION: 0 risks: Class I (very low risk - 0.4% complication rate)    The patient has the following additional risks for perioperative complications:  No identified additional risks    Assessment   (Z01.818) Pre-operative general physical examination  (primary encounter diagnosis)  Comment:   Plan: Basic Metabolic Panel (BFP), HEMOGRAM/PLATELET         (BFP), VENOUS COLLECTION, EKG 12-lead complete         w/read - Clinics             (M17.12) Primary osteoarthritis of left knee  Comment:    Plan:      Sinus Bradycardia-   Normal blood pressure    Neuropathy: patient was advised to continue his lyrica      RECOMMENDATIONS:      --Patient is to take all scheduled medications on the day of surgery EXCEPT for modifications listed below.  Vitamins, omega 3, supplements discontinue one week before surgery    APPROVAL GIVEN to proceed with proposed procedure, without further diagnostic evaluation       Signed Electronically by: Viviane Ann MD    Copy of this evaluation report is provided to requesting  physician.    [place] Preop Guidelines    Revised Cardiac Risk Index

## 2019-08-15 NOTE — NURSING NOTE
Chief Complaint   Patient presents with     Pre-Op Exam     Left Knee Replacement, Dr. Brian English, 8/26/19     Consult     fasting, med refills, cholesterol check, thyroid, uric acid, vit D, pneumonia shot/

## 2019-08-16 NOTE — PLAN OF CARE
Patient and Dr. Torres notified of positive MSSA nasal screen. Rx for Mupirocin called to [facility] in [place]. [phone]. Mupirocin use and need for extra CHG showers discussed with patient. Questions answered.

## 2019-08-22 ENCOUNTER — MYC MEDICAL ADVICE (OUTPATIENT)
Dept: FAMILY MEDICINE | Facility: CLINIC | Age: 69
End: 2019-08-22

## 2019-08-22 DIAGNOSIS — M54.50 ACUTE RIGHT-SIDED LOW BACK PAIN WITHOUT SCIATICA: ICD-10-CM

## 2019-08-23 RX ORDER — CYCLOBENZAPRINE HCL 10 MG
5-10 TABLET ORAL AT BEDTIME
Qty: 15 TABLET | Refills: 1 | Status: SHIPPED | OUTPATIENT
Start: 2019-08-23 | End: 2019-08-24

## 2019-08-26 ENCOUNTER — ANESTHESIA EVENT (OUTPATIENT)
Dept: SURGERY | Facility: CLINIC | Age: 69
End: 2019-08-26
Payer: MEDICARE

## 2019-08-26 ENCOUNTER — HOSPITAL ENCOUNTER (OUTPATIENT)
Facility: CLINIC | Age: 69
LOS: 1 days | Discharge: HOME | End: 2019-08-27
Attending: ORTHOPAEDIC SURGERY | Admitting: ORTHOPAEDIC SURGERY
Payer: MEDICARE

## 2019-08-26 ENCOUNTER — APPOINTMENT (OUTPATIENT)
Dept: PHYSICAL THERAPY | Facility: CLINIC | Age: 69
End: 2019-08-26
Attending: ORTHOPAEDIC SURGERY
Payer: MEDICARE

## 2019-08-26 ENCOUNTER — APPOINTMENT (OUTPATIENT)
Dept: GENERAL RADIOLOGY | Facility: CLINIC | Age: 69
End: 2019-08-26
Attending: ORTHOPAEDIC SURGERY
Payer: MEDICARE

## 2019-08-26 ENCOUNTER — ANESTHESIA (OUTPATIENT)
Dept: SURGERY | Facility: CLINIC | Age: 69
End: 2019-08-26
Payer: MEDICARE

## 2019-08-26 DIAGNOSIS — Z96.652 STATUS POST TOTAL LEFT KNEE REPLACEMENT: Primary | ICD-10-CM

## 2019-08-26 PROBLEM — Z96.659 S/P TOTAL KNEE ARTHROPLASTY: Status: ACTIVE | Noted: 2018-10-08

## 2019-08-26 PROCEDURE — 25000132 ZZH RX MED GY IP 250 OP 250 PS 637: Mod: GY | Performed by: INTERNAL MEDICINE

## 2019-08-26 PROCEDURE — 36000063 ZZH SURGERY LEVEL 4 EA 15 ADDTL MIN: Performed by: ORTHOPAEDIC SURGERY

## 2019-08-26 PROCEDURE — 71000013 ZZH RECOVERY PHASE 1 LEVEL 1 EA ADDTL HR: Performed by: ORTHOPAEDIC SURGERY

## 2019-08-26 PROCEDURE — 25000132 ZZH RX MED GY IP 250 OP 250 PS 637: Mod: GY | Performed by: NURSE ANESTHETIST, CERTIFIED REGISTERED

## 2019-08-26 PROCEDURE — 99207 ZZC CONSULT E&M CHANGED TO INITIAL LEVEL: CPT | Performed by: INTERNAL MEDICINE

## 2019-08-26 PROCEDURE — 97161 PT EVAL LOW COMPLEX 20 MIN: CPT | Mod: GP | Performed by: PHYSICAL THERAPIST

## 2019-08-26 PROCEDURE — 25000132 ZZH RX MED GY IP 250 OP 250 PS 637: Mod: GY | Performed by: PHYSICIAN ASSISTANT

## 2019-08-26 PROCEDURE — 97530 THERAPEUTIC ACTIVITIES: CPT | Mod: GP | Performed by: PHYSICAL THERAPIST

## 2019-08-26 PROCEDURE — 25800030 ZZH RX IP 258 OP 636: Performed by: ANESTHESIOLOGY

## 2019-08-26 PROCEDURE — 25800030 ZZH RX IP 258 OP 636: Performed by: ORTHOPAEDIC SURGERY

## 2019-08-26 PROCEDURE — 36000093 ZZH SURGERY LEVEL 4 1ST 30 MIN: Performed by: ORTHOPAEDIC SURGERY

## 2019-08-26 PROCEDURE — 25000125 ZZHC RX 250: Performed by: PHYSICIAN ASSISTANT

## 2019-08-26 PROCEDURE — 40000171 ZZH STATISTIC PRE-PROCEDURE ASSESSMENT III: Performed by: ORTHOPAEDIC SURGERY

## 2019-08-26 PROCEDURE — 25000128 H RX IP 250 OP 636: Performed by: NURSE ANESTHETIST, CERTIFIED REGISTERED

## 2019-08-26 PROCEDURE — 25000132 ZZH RX MED GY IP 250 OP 250 PS 637: Mod: GY | Performed by: ANESTHESIOLOGY

## 2019-08-26 PROCEDURE — 40000986 XR KNEE PORT LT 1/2 VW: Mod: LT

## 2019-08-26 PROCEDURE — 25000125 ZZHC RX 250: Performed by: ORTHOPAEDIC SURGERY

## 2019-08-26 PROCEDURE — 25800030 ZZH RX IP 258 OP 636: Performed by: NURSE ANESTHETIST, CERTIFIED REGISTERED

## 2019-08-26 PROCEDURE — C1776 JOINT DEVICE (IMPLANTABLE): HCPCS | Performed by: ORTHOPAEDIC SURGERY

## 2019-08-26 PROCEDURE — 25000128 H RX IP 250 OP 636: Performed by: PHYSICIAN ASSISTANT

## 2019-08-26 PROCEDURE — 25000125 ZZHC RX 250: Performed by: ANESTHESIOLOGY

## 2019-08-26 PROCEDURE — 25800025 ZZH RX 258: Performed by: ORTHOPAEDIC SURGERY

## 2019-08-26 PROCEDURE — 99219 ZZC INITIAL OBSERVATION CARE,LEVL II: CPT | Performed by: INTERNAL MEDICINE

## 2019-08-26 PROCEDURE — A9270 NON-COVERED ITEM OR SERVICE: HCPCS | Mod: GY | Performed by: NURSE ANESTHETIST, CERTIFIED REGISTERED

## 2019-08-26 PROCEDURE — 27810169 ZZH OR IMPLANT GENERAL: Performed by: ORTHOPAEDIC SURGERY

## 2019-08-26 PROCEDURE — 27210794 ZZH OR GENERAL SUPPLY STERILE: Performed by: ORTHOPAEDIC SURGERY

## 2019-08-26 PROCEDURE — 37000008 ZZH ANESTHESIA TECHNICAL FEE, 1ST 30 MIN: Performed by: ORTHOPAEDIC SURGERY

## 2019-08-26 PROCEDURE — 25000132 ZZH RX MED GY IP 250 OP 250 PS 637: Mod: GY | Performed by: ORTHOPAEDIC SURGERY

## 2019-08-26 PROCEDURE — 97110 THERAPEUTIC EXERCISES: CPT | Mod: GP | Performed by: PHYSICAL THERAPIST

## 2019-08-26 PROCEDURE — 97116 GAIT TRAINING THERAPY: CPT | Mod: GP | Performed by: PHYSICAL THERAPIST

## 2019-08-26 PROCEDURE — 71000012 ZZH RECOVERY PHASE 1 LEVEL 1 FIRST HR: Performed by: ORTHOPAEDIC SURGERY

## 2019-08-26 PROCEDURE — 37000009 ZZH ANESTHESIA TECHNICAL FEE, EACH ADDTL 15 MIN: Performed by: ORTHOPAEDIC SURGERY

## 2019-08-26 PROCEDURE — 25000128 H RX IP 250 OP 636: Performed by: ORTHOPAEDIC SURGERY

## 2019-08-26 PROCEDURE — 25000125 ZZHC RX 250: Performed by: NURSE ANESTHETIST, CERTIFIED REGISTERED

## 2019-08-26 PROCEDURE — 25000128 H RX IP 250 OP 636: Performed by: ANESTHESIOLOGY

## 2019-08-26 DEVICE — BONE CEMENT SIMPLEX FULL DOSE 6191-1-001: Type: IMPLANTABLE DEVICE | Site: KNEE | Status: FUNCTIONAL

## 2019-08-26 DEVICE — IMP PATELLA ZIM KNEE ALL POLLY 38MM 42-5400-000-38: Type: IMPLANTABLE DEVICE | Site: KNEE | Status: FUNCTIONAL

## 2019-08-26 DEVICE — IMPLANTABLE DEVICE: Type: IMPLANTABLE DEVICE | Site: KNEE | Status: FUNCTIONAL

## 2019-08-26 RX ORDER — CARBOXYMETHYLCELLULOSE SODIUM 5 MG/ML
2 SOLUTION/ DROPS OPHTHALMIC 3 TIMES DAILY PRN
Status: DISCONTINUED | OUTPATIENT
Start: 2019-08-26 | End: 2019-08-27 | Stop reason: HOSPADM

## 2019-08-26 RX ORDER — HYDROMORPHONE HYDROCHLORIDE 1 MG/ML
0.2 INJECTION, SOLUTION INTRAMUSCULAR; INTRAVENOUS; SUBCUTANEOUS
Status: DISCONTINUED | OUTPATIENT
Start: 2019-08-26 | End: 2019-08-27 | Stop reason: HOSPADM

## 2019-08-26 RX ORDER — ALBUTEROL SULFATE 90 UG/1
AEROSOL, METERED RESPIRATORY (INHALATION) PRN
Status: DISCONTINUED | OUTPATIENT
Start: 2019-08-26 | End: 2019-08-26

## 2019-08-26 RX ORDER — FENTANYL CITRATE 50 UG/ML
INJECTION, SOLUTION INTRAMUSCULAR; INTRAVENOUS PRN
Status: DISCONTINUED | OUTPATIENT
Start: 2019-08-26 | End: 2019-08-26

## 2019-08-26 RX ORDER — SODIUM CHLORIDE, SODIUM LACTATE, POTASSIUM CHLORIDE, CALCIUM CHLORIDE 600; 310; 30; 20 MG/100ML; MG/100ML; MG/100ML; MG/100ML
INJECTION, SOLUTION INTRAVENOUS CONTINUOUS
Status: DISCONTINUED | OUTPATIENT
Start: 2019-08-26 | End: 2019-08-27 | Stop reason: HOSPADM

## 2019-08-26 RX ORDER — ONDANSETRON 4 MG/1
4 TABLET, ORALLY DISINTEGRATING ORAL EVERY 30 MIN PRN
Status: DISCONTINUED | OUTPATIENT
Start: 2019-08-26 | End: 2019-08-26 | Stop reason: HOSPADM

## 2019-08-26 RX ORDER — PREGABALIN 300 MG/1
300 CAPSULE ORAL 2 TIMES DAILY
Status: DISCONTINUED | OUTPATIENT
Start: 2019-08-26 | End: 2019-08-27 | Stop reason: HOSPADM

## 2019-08-26 RX ORDER — ONDANSETRON 2 MG/ML
INJECTION INTRAMUSCULAR; INTRAVENOUS PRN
Status: DISCONTINUED | OUTPATIENT
Start: 2019-08-26 | End: 2019-08-26

## 2019-08-26 RX ORDER — CEFAZOLIN SODIUM 2 G/100ML
2 INJECTION, SOLUTION INTRAVENOUS
Status: COMPLETED | OUTPATIENT
Start: 2019-08-26 | End: 2019-08-26

## 2019-08-26 RX ORDER — LIDOCAINE 40 MG/G
CREAM TOPICAL
Status: DISCONTINUED | OUTPATIENT
Start: 2019-08-26 | End: 2019-08-27 | Stop reason: HOSPADM

## 2019-08-26 RX ORDER — OXYCODONE HYDROCHLORIDE 5 MG/1
5-10 TABLET ORAL
Status: DISCONTINUED | OUTPATIENT
Start: 2019-08-26 | End: 2019-08-27 | Stop reason: HOSPADM

## 2019-08-26 RX ORDER — ONDANSETRON 2 MG/ML
4 INJECTION INTRAMUSCULAR; INTRAVENOUS EVERY 30 MIN PRN
Status: DISCONTINUED | OUTPATIENT
Start: 2019-08-26 | End: 2019-08-26 | Stop reason: HOSPADM

## 2019-08-26 RX ORDER — ALLOPURINOL 100 MG/1
100 TABLET ORAL DAILY
Status: DISCONTINUED | OUTPATIENT
Start: 2019-08-26 | End: 2019-08-27 | Stop reason: HOSPADM

## 2019-08-26 RX ORDER — CEFAZOLIN SODIUM 1 G/3ML
1 INJECTION, POWDER, FOR SOLUTION INTRAMUSCULAR; INTRAVENOUS SEE ADMIN INSTRUCTIONS
Status: DISCONTINUED | OUTPATIENT
Start: 2019-08-26 | End: 2019-08-26 | Stop reason: HOSPADM

## 2019-08-26 RX ORDER — HYDROXYZINE HYDROCHLORIDE 10 MG/1
10 TABLET, FILM COATED ORAL EVERY 6 HOURS PRN
Status: DISCONTINUED | OUTPATIENT
Start: 2019-08-26 | End: 2019-08-27 | Stop reason: HOSPADM

## 2019-08-26 RX ORDER — LIDOCAINE 40 MG/G
CREAM TOPICAL
Status: DISCONTINUED | OUTPATIENT
Start: 2019-08-26 | End: 2019-08-26 | Stop reason: HOSPADM

## 2019-08-26 RX ORDER — CALCIUM CARBONATE 500 MG/1
1000 TABLET, CHEWABLE ORAL 4 TIMES DAILY PRN
Status: DISCONTINUED | OUTPATIENT
Start: 2019-08-26 | End: 2019-08-27 | Stop reason: HOSPADM

## 2019-08-26 RX ORDER — BUPIVACAINE HYDROCHLORIDE AND EPINEPHRINE 2.5; 5 MG/ML; UG/ML
INJECTION, SOLUTION INFILTRATION; PERINEURAL PRN
Status: DISCONTINUED | OUTPATIENT
Start: 2019-08-26 | End: 2019-08-26 | Stop reason: HOSPADM

## 2019-08-26 RX ORDER — GABAPENTIN 300 MG/1
600 CAPSULE ORAL ONCE
Status: COMPLETED | OUTPATIENT
Start: 2019-08-26 | End: 2019-08-26

## 2019-08-26 RX ORDER — LIDOCAINE HYDROCHLORIDE 10 MG/ML
INJECTION, SOLUTION INFILTRATION; PERINEURAL PRN
Status: DISCONTINUED | OUTPATIENT
Start: 2019-08-26 | End: 2019-08-26

## 2019-08-26 RX ORDER — OXYCODONE HYDROCHLORIDE 5 MG/1
5 TABLET ORAL
Qty: 50 TABLET | Refills: 0 | Status: SHIPPED | OUTPATIENT
Start: 2019-08-26 | End: 2019-10-03

## 2019-08-26 RX ORDER — SODIUM CHLORIDE, SODIUM LACTATE, POTASSIUM CHLORIDE, CALCIUM CHLORIDE 600; 310; 30; 20 MG/100ML; MG/100ML; MG/100ML; MG/100ML
INJECTION, SOLUTION INTRAVENOUS CONTINUOUS
Status: DISCONTINUED | OUTPATIENT
Start: 2019-08-26 | End: 2019-08-26 | Stop reason: HOSPADM

## 2019-08-26 RX ORDER — ACETAMINOPHEN 325 MG/1
975 TABLET ORAL EVERY 8 HOURS
Status: DISCONTINUED | OUTPATIENT
Start: 2019-08-26 | End: 2019-08-27 | Stop reason: HOSPADM

## 2019-08-26 RX ORDER — ACETAMINOPHEN 325 MG/1
975 TABLET ORAL EVERY 8 HOURS
Qty: 270 TABLET | Refills: 0 | Status: SHIPPED | OUTPATIENT
Start: 2019-08-26 | End: 2019-10-03

## 2019-08-26 RX ORDER — PROPOFOL 10 MG/ML
INJECTION, EMULSION INTRAVENOUS PRN
Status: DISCONTINUED | OUTPATIENT
Start: 2019-08-26 | End: 2019-08-26

## 2019-08-26 RX ORDER — NALOXONE HYDROCHLORIDE 0.4 MG/ML
.1-.4 INJECTION, SOLUTION INTRAMUSCULAR; INTRAVENOUS; SUBCUTANEOUS
Status: DISCONTINUED | OUTPATIENT
Start: 2019-08-26 | End: 2019-08-26 | Stop reason: HOSPADM

## 2019-08-26 RX ORDER — LACTOBACILLUS RHAMNOSUS GG 10B CELL
CAPSULE ORAL DAILY
Status: DISCONTINUED | OUTPATIENT
Start: 2019-08-26 | End: 2019-08-27 | Stop reason: HOSPADM

## 2019-08-26 RX ORDER — AMOXICILLIN 250 MG
1-2 CAPSULE ORAL 2 TIMES DAILY
Qty: 56 TABLET | Refills: 0 | Status: SHIPPED | OUTPATIENT
Start: 2019-08-26 | End: 2019-10-03

## 2019-08-26 RX ORDER — ONDANSETRON 4 MG/1
4 TABLET, ORALLY DISINTEGRATING ORAL EVERY 6 HOURS PRN
Status: DISCONTINUED | OUTPATIENT
Start: 2019-08-26 | End: 2019-08-27 | Stop reason: HOSPADM

## 2019-08-26 RX ORDER — GLYCOPYRROLATE 0.2 MG/ML
INJECTION, SOLUTION INTRAMUSCULAR; INTRAVENOUS PRN
Status: DISCONTINUED | OUTPATIENT
Start: 2019-08-26 | End: 2019-08-26

## 2019-08-26 RX ORDER — BUPIVACAINE HYDROCHLORIDE AND EPINEPHRINE 5; 5 MG/ML; UG/ML
INJECTION, SOLUTION PERINEURAL PRN
Status: DISCONTINUED | OUTPATIENT
Start: 2019-08-26 | End: 2019-08-26

## 2019-08-26 RX ORDER — DEXAMETHASONE SODIUM PHOSPHATE 4 MG/ML
INJECTION, SOLUTION INTRA-ARTICULAR; INTRALESIONAL; INTRAMUSCULAR; INTRAVENOUS; SOFT TISSUE PRN
Status: DISCONTINUED | OUTPATIENT
Start: 2019-08-26 | End: 2019-08-26

## 2019-08-26 RX ORDER — FENTANYL CITRATE 50 UG/ML
25-50 INJECTION, SOLUTION INTRAMUSCULAR; INTRAVENOUS
Status: DISCONTINUED | OUTPATIENT
Start: 2019-08-26 | End: 2019-08-26

## 2019-08-26 RX ORDER — CELECOXIB 200 MG/1
400 CAPSULE ORAL ONCE
Status: COMPLETED | OUTPATIENT
Start: 2019-08-26 | End: 2019-08-26

## 2019-08-26 RX ORDER — FLUTICASONE PROPIONATE 50 MCG
2 SPRAY, SUSPENSION (ML) NASAL DAILY PRN
Status: DISCONTINUED | OUTPATIENT
Start: 2019-08-26 | End: 2019-08-27 | Stop reason: HOSPADM

## 2019-08-26 RX ORDER — HYDROXYZINE HYDROCHLORIDE 10 MG/1
10 TABLET, FILM COATED ORAL EVERY 6 HOURS PRN
Qty: 30 TABLET | Refills: 11 | Status: SHIPPED | OUTPATIENT
Start: 2019-08-26 | End: 2019-10-03

## 2019-08-26 RX ORDER — NALOXONE HYDROCHLORIDE 0.4 MG/ML
.1-.4 INJECTION, SOLUTION INTRAMUSCULAR; INTRAVENOUS; SUBCUTANEOUS
Status: DISCONTINUED | OUTPATIENT
Start: 2019-08-26 | End: 2019-08-27 | Stop reason: HOSPADM

## 2019-08-26 RX ORDER — CEFAZOLIN SODIUM 2 G/100ML
2 INJECTION, SOLUTION INTRAVENOUS EVERY 8 HOURS
Status: COMPLETED | OUTPATIENT
Start: 2019-08-26 | End: 2019-08-26

## 2019-08-26 RX ORDER — ACETAMINOPHEN 325 MG/1
975 TABLET ORAL ONCE
Status: COMPLETED | OUTPATIENT
Start: 2019-08-26 | End: 2019-08-26

## 2019-08-26 RX ORDER — EPHEDRINE SULFATE 50 MG/ML
INJECTION, SOLUTION INTRAMUSCULAR; INTRAVENOUS; SUBCUTANEOUS PRN
Status: DISCONTINUED | OUTPATIENT
Start: 2019-08-26 | End: 2019-08-26

## 2019-08-26 RX ORDER — FENTANYL CITRATE 50 UG/ML
25-50 INJECTION, SOLUTION INTRAMUSCULAR; INTRAVENOUS EVERY 5 MIN PRN
Status: DISCONTINUED | OUTPATIENT
Start: 2019-08-26 | End: 2019-08-26 | Stop reason: HOSPADM

## 2019-08-26 RX ORDER — IBUPROFEN 600 MG/1
600 TABLET, FILM COATED ORAL EVERY 8 HOURS
Qty: 90 TABLET | Refills: 0 | Status: SHIPPED | OUTPATIENT
Start: 2019-08-26 | End: 2019-10-03

## 2019-08-26 RX ORDER — MEPERIDINE HYDROCHLORIDE 25 MG/ML
12.5 INJECTION INTRAMUSCULAR; INTRAVENOUS; SUBCUTANEOUS
Status: DISCONTINUED | OUTPATIENT
Start: 2019-08-26 | End: 2019-08-26 | Stop reason: HOSPADM

## 2019-08-26 RX ORDER — ONDANSETRON 2 MG/ML
4 INJECTION INTRAMUSCULAR; INTRAVENOUS EVERY 6 HOURS PRN
Status: DISCONTINUED | OUTPATIENT
Start: 2019-08-26 | End: 2019-08-27 | Stop reason: HOSPADM

## 2019-08-26 RX ORDER — KETOROLAC TROMETHAMINE 30 MG/ML
30 INJECTION, SOLUTION INTRAMUSCULAR; INTRAVENOUS EVERY 6 HOURS
Status: DISCONTINUED | OUTPATIENT
Start: 2019-08-26 | End: 2019-08-27 | Stop reason: HOSPADM

## 2019-08-26 RX ORDER — MULTIPLE VITAMINS W/ MINERALS TAB 9MG-400MCG
1 TAB ORAL DAILY
Status: DISCONTINUED | OUTPATIENT
Start: 2019-08-26 | End: 2019-08-27 | Stop reason: HOSPADM

## 2019-08-26 RX ADMIN — FENTANYL CITRATE 200 MCG: 50 INJECTION, SOLUTION INTRAMUSCULAR; INTRAVENOUS at 07:31

## 2019-08-26 RX ADMIN — HYDROMORPHONE HYDROCHLORIDE 1 MG: 1 INJECTION, SOLUTION INTRAMUSCULAR; INTRAVENOUS; SUBCUTANEOUS at 07:58

## 2019-08-26 RX ADMIN — SODIUM CHLORIDE, POTASSIUM CHLORIDE, SODIUM LACTATE AND CALCIUM CHLORIDE: 600; 310; 30; 20 INJECTION, SOLUTION INTRAVENOUS at 22:45

## 2019-08-26 RX ADMIN — CEFAZOLIN SODIUM 2 G: 2 INJECTION, SOLUTION INTRAVENOUS at 07:27

## 2019-08-26 RX ADMIN — Medication 10 MG: at 07:37

## 2019-08-26 RX ADMIN — MULTIPLE VITAMINS W/ MINERALS TAB 1 TABLET: TAB at 13:49

## 2019-08-26 RX ADMIN — BUPIVACAINE HYDROCHLORIDE AND EPINEPHRINE BITARTRATE 20 ML: 5; .005 INJECTION, SOLUTION EPIDURAL; INTRACAUDAL; PERINEURAL at 07:15

## 2019-08-26 RX ADMIN — OXYCODONE HYDROCHLORIDE 5 MG: 5 TABLET ORAL at 19:40

## 2019-08-26 RX ADMIN — ACETAMINOPHEN 975 MG: 325 TABLET ORAL at 06:56

## 2019-08-26 RX ADMIN — OMEPRAZOLE 40 MG: 20 CAPSULE, DELAYED RELEASE ORAL at 13:49

## 2019-08-26 RX ADMIN — GABAPENTIN 600 MG: 300 CAPSULE ORAL at 06:56

## 2019-08-26 RX ADMIN — ALBUTEROL SULFATE 6 PUFF: 90 AEROSOL, METERED RESPIRATORY (INHALATION) at 08:09

## 2019-08-26 RX ADMIN — FENTANYL CITRATE 50 MCG: 50 INJECTION, SOLUTION INTRAMUSCULAR; INTRAVENOUS at 09:44

## 2019-08-26 RX ADMIN — MIDAZOLAM 2 MG: 1 INJECTION INTRAMUSCULAR; INTRAVENOUS at 07:24

## 2019-08-26 RX ADMIN — OXYCODONE HYDROCHLORIDE 5 MG: 5 TABLET ORAL at 22:44

## 2019-08-26 RX ADMIN — OXYCODONE HYDROCHLORIDE 5 MG: 5 TABLET ORAL at 11:11

## 2019-08-26 RX ADMIN — CELECOXIB 400 MG: 200 CAPSULE ORAL at 06:43

## 2019-08-26 RX ADMIN — HYDROMORPHONE HYDROCHLORIDE 0.5 MG: 1 INJECTION, SOLUTION INTRAMUSCULAR; INTRAVENOUS; SUBCUTANEOUS at 10:08

## 2019-08-26 RX ADMIN — ONDANSETRON 4 MG: 2 INJECTION INTRAMUSCULAR; INTRAVENOUS at 08:31

## 2019-08-26 RX ADMIN — FENTANYL CITRATE 50 MCG: 50 INJECTION, SOLUTION INTRAMUSCULAR; INTRAVENOUS at 09:27

## 2019-08-26 RX ADMIN — LIDOCAINE HYDROCHLORIDE 50 MG: 10 INJECTION, SOLUTION INFILTRATION; PERINEURAL at 07:31

## 2019-08-26 RX ADMIN — ALLOPURINOL 100 MG: 100 TABLET ORAL at 13:49

## 2019-08-26 RX ADMIN — CEFAZOLIN SODIUM 2 G: 2 INJECTION, SOLUTION INTRAVENOUS at 15:34

## 2019-08-26 RX ADMIN — KETOROLAC TROMETHAMINE 30 MG: 30 INJECTION, SOLUTION INTRAMUSCULAR at 18:40

## 2019-08-26 RX ADMIN — ACETAMINOPHEN 975 MG: 325 TABLET, FILM COATED ORAL at 18:40

## 2019-08-26 RX ADMIN — DEXAMETHASONE SODIUM PHOSPHATE 4 MG: 4 INJECTION, SOLUTION INTRA-ARTICULAR; INTRALESIONAL; INTRAMUSCULAR; INTRAVENOUS; SOFT TISSUE at 07:31

## 2019-08-26 RX ADMIN — PREGABALIN 300 MG: 300 CAPSULE ORAL at 15:46

## 2019-08-26 RX ADMIN — FENTANYL CITRATE 50 MCG: 50 INJECTION, SOLUTION INTRAMUSCULAR; INTRAVENOUS at 07:56

## 2019-08-26 RX ADMIN — PROPOFOL 150 MG: 10 INJECTION, EMULSION INTRAVENOUS at 07:31

## 2019-08-26 RX ADMIN — SODIUM CHLORIDE, POTASSIUM CHLORIDE, SODIUM LACTATE AND CALCIUM CHLORIDE: 600; 310; 30; 20 INJECTION, SOLUTION INTRAVENOUS at 11:02

## 2019-08-26 RX ADMIN — DEXMEDETOMIDINE HYDROCHLORIDE 0.4 MCG/KG/HR: 100 INJECTION, SOLUTION INTRAVENOUS at 07:51

## 2019-08-26 RX ADMIN — SODIUM CHLORIDE, POTASSIUM CHLORIDE, SODIUM LACTATE AND CALCIUM CHLORIDE: 600; 310; 30; 20 INJECTION, SOLUTION INTRAVENOUS at 06:54

## 2019-08-26 RX ADMIN — Medication 1 G: at 07:39

## 2019-08-26 RX ADMIN — GLYCOPYRROLATE 0.2 MG: 0.2 INJECTION, SOLUTION INTRAMUSCULAR; INTRAVENOUS at 07:41

## 2019-08-26 RX ADMIN — Medication 10 MG: at 08:18

## 2019-08-26 RX ADMIN — Medication 1 CAPSULE: at 13:49

## 2019-08-26 RX ADMIN — ACETAMINOPHEN 975 MG: 325 TABLET, FILM COATED ORAL at 11:11

## 2019-08-26 RX ADMIN — CEFAZOLIN SODIUM 2 G: 2 INJECTION, SOLUTION INTRAVENOUS at 22:44

## 2019-08-26 RX ADMIN — OXYCODONE HYDROCHLORIDE 5 MG: 5 TABLET ORAL at 15:34

## 2019-08-26 ASSESSMENT — MIFFLIN-ST. JEOR: SCORE: 2042.71

## 2019-08-26 ASSESSMENT — ENCOUNTER SYMPTOMS
DYSRHYTHMIAS: 0
SEIZURES: 0

## 2019-08-26 NOTE — PHARMACY-ADMISSION MEDICATION HISTORY
Medication reconciliation completed by pre-admitting.    Prior to Admission medications    Medication Sig Last Dose Taking? Auth Provider   ACIDOPHILUS LACTOBACILLUS PO Take 700 mg by mouth daily  Past Week at Unknown time Yes Reported, Patient   allopurinol (ZYLOPRIM) 100 MG tablet Take 100 mg by mouth daily 8/24/2019 at pm Yes Reported, Patient   carboxymethylcellulose PF (REFRESH PLUS) 0.5 % ophthalmic solution Place 2 drops into both eyes 3 times daily as needed for dry eyes 8/24/2019 at Unknown time Yes Reported, Patient   Cholecalciferol (VITAMIN D-3) 5000 units TABS Take 5,000 Units by mouth daily  Past Week at Unknown time Yes Reported, Patient   levothyroxine (SYNTHROID/LEVOTHROID) 100 MCG tablet TAKE 1 TABLET (100 MCG) BY MOUTH DAILY 8/24/2019 at Unknown time Yes Viviane Ann MD   multivitamin w/minerals (MULTI-VITAMIN) tablet Take 1 tablet by mouth daily Pt uses [employer] brand Past Week at Unknown time Yes Reported, Patient   mupirocin (BACTROBAN) 2 % nasal ointment Apply 1 g into each nare 2 times daily 8/24/2019 at Unknown time Yes Reported, Patient   NONFORMULARY Yang-remedy B12 one daily Past Week at Unknown time Yes Reported, Patient   NONFORMULARY A-reds2 - 2 capsules daily Past Week at Unknown time Yes Reported, Patient   Omega-3 Fatty Acids (OMEGA-3 PO) Take 1,000 mg by mouth daily  Past Week at Unknown time Yes Reported, Patient   omeprazole (PRILOSEC) 40 MG capsule Take 1 capsule (40 mg) by mouth daily Take 30-60 minutes before a meal. 8/25/2019 at Unknown time Yes Viviane Ann MD   pregabalin (LYRICA) 150 MG capsule Take 2 capsules (300 mg) by mouth 2 times daily 8/25/2019 at 1600 Yes Viviane Ann MD   tadalafil (CIALIS) 20 MG tablet Take 1 tablet (20 mg) by mouth daily as needed Past Month at Unknown time Yes Viviane Ann MD   mometasone (NASONEX) 50 MCG/ACT spray Spray 2 sprays into both nostrils daily More than a month at Unknown time  Viviane Ann MD

## 2019-08-26 NOTE — ANESTHESIA CARE TRANSFER NOTE
Patient: Rojelio Mosqueda    Procedure(s):  Left total knee arthroplasty    Diagnosis: djd left knee  Diagnosis Additional Information: No value filed.    Anesthesia Type:   General, Periph. Nerve Block for postop pain, LMA     Note:  Airway :Face Mask  Patient transferred to:PACU  Comments: At end of procedure, spontaneous respirations, adequate tidal volumes, followed commands to voice, LMA removed atraumatically, oropharynx suctioned, airway patent after LMA removal. Oxygen via facemask at 6 liters per minute to PACU. Oxygen tubing connected to wall O2 in PACU, SpO2, NiBP, and EKG monitors and alarms on and functioning, report on patient's clinical status given to PACU RN, RN questions answered.Handoff Report: Identifed the Patient, Identified the Reponsible Provider, Reviewed the pertinent medical history, Discussed the surgical course, Reviewed Intra-OP anesthesia mangement and issues during anesthesia, Set expectations for post-procedure period and Allowed opportunity for questions and acknowledgement of understanding      Vitals: (Last set prior to Anesthesia Care Transfer)    CRNA VITALS  8/26/2019 0844 - 8/26/2019 0921      8/26/2019             NIBP:  120/74    Pulse:  91    NIBP Mean:  90    SpO2:  95 %    Resp Rate (observed):  5  (Abnormal)         pacu VS: 122/78-84-14-98%-97.2f        Electronically Signed By: KEN Dickerson CRNA  August 26, 2019  9:21 AM

## 2019-08-26 NOTE — PROGRESS NOTES
[facility]      OUTPATIENT PHYSICAL THERAPY EVALUATION  PLAN OF TREATMENT FOR OUTPATIENT REHABILITATION  (COMPLETE FOR INITIAL CLAIMS ONLY)  Patient's Last Name, First Name, M.I.  YOB: 1950  Rojelio Mosqueda                        Provider's Name  [facility] Medical Record No.  [id number]                               Onset Date:  08/26/19   Start of Care Date:  08/26/19      Type:     _X_PT   ___OT   ___SLP Medical Diagnosis:  L TKA                        PT Diagnosis:  Impaired functional mobility   Visits from SOC:  1   _________________________________________________________________________________  Plan of Treatment/Functional Goals    Planned Interventions: Cryotherapy, Ice PRN  bed mobility training, gait training, ROM, strengthening, stretching, transfer training, home program guidelines, progressive activity/exercise,       Goals: See Physical Therapy Goals on Care Plan in [facility] [employer] electronic health record.    Therapy Frequency: Daily  Predicted Duration of Therapy Intervention: 2 days  _________________________________________________________________________________    I CERTIFY THE NEED FOR THESE SERVICES FURNISHED UNDER        THIS PLAN OF TREATMENT AND WHILE UNDER MY CARE     (Physician co-signature of this document indicates review and certification of the therapy plan).                Certification date from: 08/26/19, Certification date to: 08/28/19    Referring Physician: Javi Torres MD            Initial Assessment        See Physical Therapy evaluation dated 08/26/19 in Epic electronic health record.

## 2019-08-26 NOTE — ANESTHESIA PREPROCEDURE EVALUATION
ANESTHESIA PRE EVALUATION:  Anesthesia Evaluation     . Pt has had prior anesthetic. Type: General and Regional    No history of anesthetic complications          ROS/MED HX    ENT/Pulmonary:      (-) asthma, sleep apnea and Other pulmonary disease   Neurologic:     (+)neuropathy - Bilateral Lower Ext Stable,    (-) seizures   Cardiovascular:  - neg cardiovascular ROS   (+) ----. : . . . :. . Previous cardiac testing date:results:date: results:ECG reviewed date:8/15/19 results:NSR date: results:         (-) hypertension, CAD, syncope, arrhythmias and dyslipidemia   METS/Exercise Tolerance:  >4 METS   Hematologic: Comments: Hb 15 - neg hematologic  ROS       Musculoskeletal:   (+) arthritis,  -       GI/Hepatic:     (+) GERD hiatal hernia,       Renal/Genitourinary:  - ROS Renal section negative    (-) renal disease   Endo:     (+) thyroid problem hypothyroidism, .   (-) Type I DM, Type II DM, chronic steroid usage, other endocrine disorder and obesity   Psychiatric:     (+) psychiatric history depression      Infectious Disease:  - neg infectious disease ROS       Malignancy:      - no malignancy   Other:                     Physical Exam  Normal systems: cardiovascular, pulmonary and dental    Airway   Mallampati: II  TM distance: >3 FB  Neck ROM: full    Dental     Cardiovascular   Rhythm and rate: regular and normal  (-) no murmur    Pulmonary    breath sounds clear to auscultation    Other findings:                 Anesthesia Plan      History & Physical Review  History and physical reviewed and following examination; no interval change.    ASA Status:  2 .    NPO Status:  > 8 hours    Plan for General, Periph. Nerve Block for postop pain and LMA with Intravenous and Propofol induction. Maintenance will be Inhalation.    PONV prophylaxis:  Ondansetron (or other 5HT-3) and Dexamethasone or Solumedrol       Postoperative Care  Postoperative pain management:  IV analgesics, Oral pain medications and  Peripheral nerve block (Single Shot).      Consents  Anesthetic plan, risks, benefits and alternatives discussed with:  Patient.  Use of blood products discussed: No .   .      Plan general per patient request.  Single shot peripheral nerve block completed    The surgeon has given a verbal order transferring care of this patient to me for the performance of a regional analgesia block for post-op pain control. It is requested of me because I am uniquely trained and qualified to perform this block and the surgeon is neither trained nor qualified to perform this procedure.                      .

## 2019-08-26 NOTE — PROGRESS NOTES
08/26/19 1553   Quick Adds   Quick Adds Certification   Type of Visit Initial PT Evaluation   Living Environment   Lives With spouse   Living Arrangements condominium   Home Accessibility no concerns   Transportation Anticipated family or friend will provide   Living Environment Comment Has a raised toilet seat, shower chair, and walk in shower.    Self-Care   Usual Activity Tolerance good   Current Activity Tolerance moderate   Regular Exercise Yes   Activity/Exercise Type   (Pickleball 1-3x/week, bikes as well)   Equipment Currently Used at Home none   Activity/Exercise/Self-Care Comment Has cane and walker from previous TKA   Functional Level Prior   Ambulation 0-->independent   Transferring 0-->independent   Toileting 0-->independent   Bathing 0-->independent   Communication 0-->understands/communicates without difficulty   Fall history within last six months no   General Information   Onset of Illness/Injury or Date of Surgery - Date 08/26/19   Referring Physician Javi Torres MD   Patient/Family Goals Statement Discharge home tomorrow   Pertinent History of Current Problem (include personal factors and/or comorbidities that impact the POC) Pt is POD0 L TKA   Precautions/Limitations fall precautions   Weight-Bearing Status - LLE weight-bearing as tolerated   Weight-Bearing Status - RLE full weight-bearing   General Observations Pt supine upon initiation, agreeable to session.    Cognitive Status Examination   Orientation orientation to person, place and time   Level of Consciousness alert   Follows Commands and Answers Questions 100% of the time   Personal Safety and Judgment intact   Memory intact   Pain Assessment   Patient Currently in Pain Yes, see Vital Sign flowsheet   Integumentary/Edema   Integumentary/Edema Comments Ace wrap intact to L LE   Posture    Posture Forward head position;Protracted shoulders   Range of Motion (ROM)   ROM Comment L knee flexion limited to ~45 degrees   Strength  "  Strength Comments Able to SLR B LE   Bed Mobility   Bed Mobility Comments sit<>supine SBA   Transfer Skills   Transfer Comments sit<>stand SBA   Gait   Gait Comments CGA with FWW   Balance   Balance Comments Requires B UE support for safe dynamic mobility   Sensory Examination   Sensory Perception Comments Baseline neuropathy to B feet   Coordination   Coordination no deficits were identified   Muscle Tone   Muscle Tone no deficits were identified   Modality Interventions   Planned Modality Interventions Cryotherapy   Planned Modality Interventions Comments Ice PRN   General Therapy Interventions   Planned Therapy Interventions bed mobility training;gait training;ROM;strengthening;stretching;transfer training;home program guidelines;progressive activity/exercise   Clinical Impression   Criteria for Skilled Therapeutic Intervention yes, treatment indicated   PT Diagnosis Impaired functional mobility   Influenced by the following impairments Pain, weakness, impaired L knee flexion   Functional limitations due to impairments Difficulty with bed mobility, transfers, ambulation   Clinical Presentation Stable/Uncomplicated   Clinical Presentation Rationale medically stable, clear POC   Clinical Decision Making (Complexity) Low complexity   Therapy Frequency Daily   Predicted Duration of Therapy Intervention (days/wks) 2 days   Anticipated Discharge Disposition Home with Outpatient Therapy   Risk & Benefits of therapy have been explained Yes   Patient, Family & other staff in agreement with plan of care Yes   Therapy Certification   Start of care date 08/26/19   Certification date from 08/26/19   Certification date to 08/28/19   Medical Diagnosis L TKA   [facility] YASSSU-PAC TM \"6 Clicks\"   2016, Trustees of [facility], under license to [employer].  All rights reserved.   6 Clicks Short Forms Basic Mobility Inpatient Short Form   [facility] AM-PAC  \"6 Clicks\" V.2 Basic Mobility Inpatient Short Form "   1. Turning from your back to your side while in a flat bed without using bedrails? 4 - None   2. Moving from lying on your back to sitting on the side of a flat bed without using bedrails? 3 - A Little   3. Moving to and from a bed to a chair (including a wheelchair)? 3 - A Little   4. Standing up from a chair using your arms (e.g., wheelchair, or bedside chair)? 3 - A Little   5. To walk in hospital room? 3 - A Little   6. Climbing 3-5 steps with a railing? 3 - A Little   Basic Mobility Raw Score (Score out of 24.Lower scores equate to lower levels of function) 19   Total Evaluation Time   Total Evaluation Time (Minutes) 8

## 2019-08-26 NOTE — OP NOTE
Procedure Date: 08/26/2019      PREOPERATIVE DIAGNOSIS:  Osteoarthritis, left knee.      POSTOPERATIVE DIAGNOSIS:  Osteoarthritis, left knee.      PROCEDURE PERFORMED:  Left total knee arthroplasty.      SURGEON:  Brian Caldwell MD.       ASSISTANT:  Lynda Patel PA-C.      ANESTHESIA:  General with adductor canal block.      ESTIMATED BLOOD LOSS:  25 mL.      TOURNIQUET TIME:  Approximately 75 minutes.      COMPLICATIONS:  None.      DESCRIPTION OF PROCEDURE:  The patient was taken to the operating room where after successful administration of general anesthetic, adductor canal block, tranexamic acid, antibiotic prophylaxis and sterile prep and drape, the leg was exsanguinated and an anterior incision was made followed by a medial arthrotomy with a moderate posteromedial soft tissue release and removal of medial osteophytes.  An intramedullary 5-degree guide was used with anterior referencing at 3 degrees of external rotation which matched the epicondylar axis and a box cut was made for PCL substitution.  Retractors were carefully placed about the proximal tibia and a cut was made perpendicular to the mechanical axis of the tibia, removing initially no medial bone followed by a total of 2 mm of medial bone.  The medial bone was quite sclerotic and lifted the saw blade, creating difficulties for a true perpendicular cut.  This was a cut an additional time to ensure the best possible surface which was prepared due to the sclerotic medial bone.  The tibia was sized and prepared.  Posterior osteophytes were removed under direct vision and a capsular injection was performed followed by a 10 mm resection from the undersurface of a 27 mm patella, which was sized appropriately.  Flexion and extension gaps were rectangular and symmetric.      After copious lavage and drying, Simplex cementing was performed for Hardeep Persona size 10 femur, size G tibia, 13 mm polyethylene insert and a 38 mm patellar button.  Range of  motion, balance and tracking were all excellent.  There was no laxity at 0, 30, or 60 degrees.  There was easy gravity flexion.  Copious antibiotic and Betadine irrigation were performed followed by layered closure over a deep drain.  There were no complications.         NAVNEET HILARIO MD             D: 2019   T: 2019   MT: ANDIE      Name:     ESTEPHANIA ÁLVAREZ   MRN:      [id number]        Account:        [id number]   :      1950           Procedure Date: 2019      Document: [id number]

## 2019-08-26 NOTE — ANESTHESIA POSTPROCEDURE EVALUATION
Patient: Rojelio Mosqueda    Procedure(s):  Left total knee arthroplasty    Diagnosis:djd left knee  Diagnosis Additional Information: No value filed.    Anesthesia Type:  General, Periph. Nerve Block for postop pain, LMA    Note:  Anesthesia Post Evaluation    Patient location during evaluation: PACU  Patient participation: Able to fully participate in evaluation  Level of consciousness: awake and alert  Pain management: adequate  Airway patency: patent  Cardiovascular status: acceptable  Respiratory status: acceptable  Hydration status: acceptable  PONV: controlled             Last vitals:  Vitals:    08/26/19 0920 08/26/19 0925 08/26/19 0930   BP: (!) 131/102 127/80 130/76   Pulse: 81 70 76   Resp:      Temp:      SpO2: 97% 94% 94%         Electronically Signed By: David Yanez MD  August 26, 2019  10:09 AM

## 2019-08-26 NOTE — CONSULTS
[facility]    Hospitalist Consult Note    Name: Rojelio Mosqueda    MRN: [id number]  YOB: 1950    Age: 68 year old  Date of admission: 8/26/2019  Primary care provider: Viviane Ann          Requesting Physician:      Reason for consult:  Postop medical management         Assessment:      Rojelio Mosqueda is a 68 year old male who was admitted for elective left knee arthroplasty for osteoarthritis of left knee.  Hospital service was consulted for postoperative medical management. Patient was seen and examined by me postoperatively and is doing well.  His questions and concerns addressed.    #1.  Postoperative day #0 following left total knee arthroplasty by orthopedic surgery: Hemodynamically stable postoperatively.  Orthopedic surgery following primarily in hospital service is consulted for assisting postoperative medical management    #2.  Peripheral neuropathy: Stable    #3.  GERD: On PPI    #4.  Postoperative hypoxia: Likely from atelectasis    #5.  Hypothyroidism on levothyroxine             Recommendations:    1.Continue Postop pain control  2.I recommend some form of VTE prophylaxis and specific method deferred to primary team  3.Home medications reviewed and continued as appropriate   4.Diet as tolerated   5.Monitor Hgb closely,assess postop anemia and iron repletion for asymptomatic anemia and blood transfusion for symptomatic anemia or if hgb is less than 7  6.Physical therapy and early mobility recommended   7.Discharge planning per primary team   8.Encourage Incentive Spirometry and oxygen as needed   9.Additional recommendations: Continue to monitor closely, oxygen as needed as well as incentive spirometry.  Monitor pulse                   History of Present Illness:     Rojelio Mosqueda is a 68 year old male who was admitted for elective neck surgery  Date of procedure: August 26, 2019  I was asked to consult for postoperative medical management.  Patient  denies any chest pain or shortness of breath.  No other complaints of abdominal pain nausea vomiting.  Patient had preoperative history and physical denied by his family physician.    Patient has no chest pain ,Shortness of breath or fever.    Pain control is optimal on current medications.                  Past Medical History:     Past Medical History:   Diagnosis Date     Arthritis      Depressive disorder, not elsewhere classified 7/21/2002     Esophageal reflux      Family history of diabetes mellitus      GERD (gastroesophageal reflux disease)      Idiopathic neuropathy      Peripheral neuropathy      Thyroid disease              Past Surgical History:     Past Surgical History:   Procedure Laterality Date     ARTHROPLASTY KNEE Right 10/8/2018    Procedure: ARTHROPLASTY KNEE;  1.  Right total knee arthroplasty.   2.  Intra-articular corticosteroid injection, left knee. ;  Surgeon: Javi Torres MD;  Location: [facility] OR     C NONSPECIFIC PROCEDURE  2/2011    upper endos/ mod hiatal hernia/ antireflux tx     COLONOSCOPY  2007    [facility]     HERNIA REPAIR      under the age of [age]     INJECT STEROID (LOCATION) Left 10/8/2018    Procedure: INJECT STEROID (LOCATION);;  Surgeon: Javi Torres MD;  Location: [facility] OR     lt. foot-surgery                 Social History:     Social History     Tobacco Use     Smoking status: Former Smoker     Packs/day: 1.00     Years: 15.00     Pack years: 15.00     Types: Cigarettes     Last attempt to quit: 1/1/2004     Years since quitting: 15.6     Smokeless tobacco: Never Used     Tobacco comment: Smoked off & on   Substance Use Topics     Alcohol use: Yes     Alcohol/week: 1.5 oz     Types: 3 Standard drinks or equivalent per week     Comment: 2-3 days per week 1-2 drinks per time             Family History:     Family History   Problem Relation Age of Onset     Diabetes Mother      Cancer No family hx of      Heart Disease No family hx of               "Allergies:     Allergies   Allergen Reactions     Cat Hair Extract Shortness Of Breath     Dust Mites Itching     No Known Drug Allergies              Medications:       acetaminophen  975 mg Oral Q8H     allopurinol  100 mg Oral Daily     [START ON 8/27/2019] aspirin  325 mg Oral Daily     ceFAZolin  2 g Intravenous Q8H     ketorolac  30 mg Intravenous Q6H     lactobacillus rhamnosus (GG)   Oral Daily     multivitamin w/minerals  1 tablet Oral Daily     omeprazole  40 mg Oral Daily     pregabalin  300 mg Oral BID     sodium chloride (PF)  3 mL Intracatheter Q8H             Review of Systems:   A comprehensive greater than 10 system review of systems was carried out.  Pertinent positives and negatives are noted above.  Otherwise negative for contributory info.            Physical Exam:     All vitals have been reviewed  Blood pressure (!) 131/92, pulse 62, temperature 97.8  F (36.6  C), temperature source Oral, resp. rate 16, height 1.88 m (6' 2\"), weight 119.3 kg (263 lb), SpO2 (!) 88 %.  No intake/output data recorded.  Constitutional:   awake and alert     Neck:   supple, symmetrical, trachea midline, skin normal and no stridor     Lungs:   no increased work of breathing, good air exchange, no retractions and clear to auscultation     Cardiovascular:   normal apical pulses  and normal S1 and S2     Abdomen:   normal bowel sounds, soft, non-distended and non-tender     Neurologic:   Mental Status Exam:  Level of Alertness:   awake  Cranial Nerves:  cranial nerves II-XII are grossly intact          Data:            Recent Results (from the past 48 hour(s))   XR Knee Port Left 1/2 Views    Narrative    KNEE PORTABLE LEFT ONE TO TWO VIEWS August 26, 2019 9:52 AM     HISTORY: Postoperative total knee, evaluate for complications.     FINDINGS: Total knee arthroplasty with overlying skin staples and  drainage tubing.             "

## 2019-08-26 NOTE — PLAN OF CARE
A&Ox4. VSS. Has not been OOB yet. Tolerating clears well. Numbness to BLE at baseline. Pain controlled with 5mg oxy and scheduled tylenol. Hemovac in place. Perez in place. Dressing CDI. Capno in place with 2LPM. Plans to discharge home tomorrow.

## 2019-08-26 NOTE — ANESTHESIA PROCEDURE NOTES
Peripheral nerve/Neuraxial procedure note : Adductor canal  Pre-Procedure  Performed by David Yanez MD  Referred by MD SULAIMAN  Location: pre-op    Procedure Times:8/26/2019 7:12 AM and 8/26/2019 7:15 AM  Pre-Anesthestic Checklist: patient identified, IV checked, site marked, risks and benefits discussed, informed consent, monitors and equipment checked, pre-op evaluation, at physician/surgeon's request and post-op pain management    Timeout  Correct Patient: Yes   Correct Procedure: Yes   Correct Site: Yes   Correct Laterality: Yes   Correct Position: Yes   Site Marked: Yes   .   Procedure Documentation    Diagnosis:TKA.    Procedure:  left  Adductor canal.  Local skin infiltrated with mL of 1% lidocaine.     Ultrasound used to identify targeted nerve, plexus, or vascular marker and placed a needle adjacent to it., Ultrasound was used to visualize the spread of the anesthetic in close proximity to the above stated nerve.   Patient Prep;sterile gloves, chlorhexidine gluconate and isopropyl alcohol.  .  Needle: insulated, short bevel Needle Gauge: 21.    Needle Length (Inches) 4  Insertion Method: Single Shot.       Assessment/Narrative  Paresthesias: Resolved and Yes (1 short duration paresthesia, needle repositioned, no further issues).  .  The placement was negative for: blood aspirated, painful injection and site bleeding.  Bolus given via..   Secured via.   Complications: none. Test dose of mL at. Test dose negative for signs of intravascular, subdural or intrathecal injection.

## 2019-08-26 NOTE — PLAN OF CARE
PT: Orders received. PT evaluation completed and treatment initiated. Pt is POD0 L TKA. Pt with history of R TKA 10/19. Pt lives in a single level condo with no stairs to access. Pt lives with a spouse who is available to assist as needed at discharge. Pt is fairly active and plays pickleball 1-3x/week. Pt retained walker from previous TKA, but does not utilize. Also has raised toilet seat, shower chair, and cane.     Discharge Planner PT   Patient plan for discharge: Home with OPPT starting Thursday  Current status: Pt supine upon initiation, agreeable to session. Pt completes sit<>supine with SBA and minimal cuing for technique. Upon sitting EOB pt's HR noted to be 170 on capno monitor, however pt asymptomatic. Compared to vitals machine in room, and HR noted to be in 70s. Pt completes sit<>stand with SBA and cues for both UE and LE placement. Pt ambulates 120' with use of FWW and CGA progressing to SBA with cues for walker management, step length, and pacing. Pt encouraged to ambulate again this PM. Pt and RN in agreement.   Barriers to return to prior living situation: None anticipated  Recommendations for discharge: Home with OPPT  Rationale for recommendations: Anticipate that with continued IPPT the pt will be able to complete all mobility skills required for safe discharge home. Anticipate pt will meet PT goals in 1 more session. OPPT indicated to maximize post-op outcomes.        Entered by: Anitra Ventura 08/26/2019 4:45 PM

## 2019-08-26 NOTE — PROGRESS NOTES
Patient vital signs are at baseline: Yes  Patient able to ambulate as they were prior to admission or with assist devices provided by therapies during their stay:  Yes  Patient MUST void prior to discharge:  No,  Reason:  Perez in place until POD 1  Patient able to tolerate oral intake:  Yes  Pain has adequate pain control using Oral analgesics:  Yes

## 2019-08-27 ENCOUNTER — APPOINTMENT (OUTPATIENT)
Dept: PHYSICAL THERAPY | Facility: CLINIC | Age: 69
End: 2019-08-27
Attending: ORTHOPAEDIC SURGERY
Payer: MEDICARE

## 2019-08-27 VITALS
RESPIRATION RATE: 16 BRPM | DIASTOLIC BLOOD PRESSURE: 69 MMHG | HEART RATE: 57 BPM | WEIGHT: 263 LBS | HEIGHT: 74 IN | OXYGEN SATURATION: 95 % | BODY MASS INDEX: 33.75 KG/M2 | TEMPERATURE: 96.9 F | SYSTOLIC BLOOD PRESSURE: 122 MMHG

## 2019-08-27 LAB
GLUCOSE SERPL-MCNC: 111 MG/DL (ref 70–99)
HGB BLD-MCNC: 11.8 G/DL (ref 13.3–17.7)

## 2019-08-27 PROCEDURE — 85018 HEMOGLOBIN: CPT | Performed by: ORTHOPAEDIC SURGERY

## 2019-08-27 PROCEDURE — 97116 GAIT TRAINING THERAPY: CPT | Mod: GP | Performed by: PHYSICAL THERAPIST

## 2019-08-27 PROCEDURE — 25000128 H RX IP 250 OP 636: Performed by: ORTHOPAEDIC SURGERY

## 2019-08-27 PROCEDURE — 25000132 ZZH RX MED GY IP 250 OP 250 PS 637: Mod: GY | Performed by: INTERNAL MEDICINE

## 2019-08-27 PROCEDURE — 25000132 ZZH RX MED GY IP 250 OP 250 PS 637: Mod: GY | Performed by: ORTHOPAEDIC SURGERY

## 2019-08-27 PROCEDURE — 97110 THERAPEUTIC EXERCISES: CPT | Mod: GP | Performed by: PHYSICAL THERAPIST

## 2019-08-27 PROCEDURE — 82947 ASSAY GLUCOSE BLOOD QUANT: CPT | Performed by: ORTHOPAEDIC SURGERY

## 2019-08-27 PROCEDURE — 36415 COLL VENOUS BLD VENIPUNCTURE: CPT | Performed by: ORTHOPAEDIC SURGERY

## 2019-08-27 RX ADMIN — KETOROLAC TROMETHAMINE 30 MG: 30 INJECTION, SOLUTION INTRAMUSCULAR at 08:23

## 2019-08-27 RX ADMIN — PREGABALIN 300 MG: 300 CAPSULE ORAL at 08:26

## 2019-08-27 RX ADMIN — OXYCODONE HYDROCHLORIDE 5 MG: 5 TABLET ORAL at 01:51

## 2019-08-27 RX ADMIN — KETOROLAC TROMETHAMINE 30 MG: 30 INJECTION, SOLUTION INTRAMUSCULAR at 01:51

## 2019-08-27 RX ADMIN — MULTIPLE VITAMINS W/ MINERALS TAB 1 TABLET: TAB at 08:25

## 2019-08-27 RX ADMIN — ASPIRIN 325 MG: 325 TABLET, DELAYED RELEASE ORAL at 08:24

## 2019-08-27 RX ADMIN — ACETAMINOPHEN 975 MG: 325 TABLET, FILM COATED ORAL at 08:24

## 2019-08-27 RX ADMIN — ALLOPURINOL 100 MG: 100 TABLET ORAL at 08:25

## 2019-08-27 RX ADMIN — OXYCODONE HYDROCHLORIDE 5 MG: 5 TABLET ORAL at 06:11

## 2019-08-27 RX ADMIN — OXYCODONE HYDROCHLORIDE 5 MG: 5 TABLET ORAL at 12:01

## 2019-08-27 RX ADMIN — OMEPRAZOLE 40 MG: 20 CAPSULE, DELAYED RELEASE ORAL at 06:11

## 2019-08-27 RX ADMIN — Medication 1 CAPSULE: at 08:25

## 2019-08-27 RX ADMIN — OXYCODONE HYDROCHLORIDE 5 MG: 5 TABLET ORAL at 09:22

## 2019-08-27 ASSESSMENT — ACTIVITIES OF DAILY LIVING (ADL)
RETIRED_COMMUNICATION: 0-->UNDERSTANDS/COMMUNICATES WITHOUT DIFFICULTY
TRANSFERRING: 0-->INDEPENDENT
TOILETING: 0-->INDEPENDENT
RETIRED_EATING: 0-->INDEPENDENT
SWALLOWING: 0-->SWALLOWS FOODS/LIQUIDS WITHOUT DIFFICULTY
BATHING: 0-->INDEPENDENT
COGNITION: 0 - NO COGNITION ISSUES REPORTED
FALL_HISTORY_WITHIN_LAST_SIX_MONTHS: NO
DRESS: 0-->INDEPENDENT
AMBULATION: 0-->INDEPENDENT

## 2019-08-27 NOTE — DISCHARGE INSTRUCTIONS
Return to clinic in 10-14 days.  Call [phone] to schedule or if you experience any problems before your scheduled appointment.      See general discharge instruction sheet for knees  1. Do exercises at home as instructed by therapist twice a day. Continue outpatient therapy as ordered by your doctor.  2. Ice knee after exercises and therapy.  3. Examine dressing daily for problems.  4. May shower, can get dressing wet, no soaking (no bathtubs, pools or hot tubs)  5. Notify your dentist or physician of your implant so you can get antibiotics before any dental work or before any invasive procedure (ie: colonoscopy)  6. Remove anti-embolism stockings (Sharan hose) for 30 minutes twice daily.      Aquacel dressing:  DO NOT CHANGE DRESSING DAILY.  Leave this dressing on until follow-up appointment with Orthopedic Surgeon.  Dressing is waterproof, can shower with it on, pat dry when done.  No soaking such as in tub baths, pools or hot tubs        While on narcotic pain medication, to prevent constipation:  1. Drink plenty of water to keep well hydrated   2. May take over the counter Colace or Senna (follow instructions on label)        Call your physician if: ([phone])   1. Persistent fever greater than 100 degrees with body chills or excessive sweating.  2. Increased/persistent redness, localized warmth, tenderness, drainage or swelling at incision site. Greater than 50% drainage on dressing.   3. Persistent pain not controlled with oral pain medications, ice and rest.   4. No bowel movement in 3 days (may use Milk of Magnesia or other over the counter remedy).  5. Chest pain, shortness of breath, and/or calf pain with excessive swelling.  6. Generalized feeling of illness.  7. Any other questions or concerns related to your surgery/recovery.    Thank you for allowing [facility] to participate in your cares!!

## 2019-08-27 NOTE — PLAN OF CARE
Patient vital signs are at baseline: Yes  Patient able to ambulate as they were prior to admission or with assist devices provided by therapies during their stay:  Yes, Ambulating with A1 gait belt and walker.  Patient MUST void prior to discharge:  No,  Reason:  Perez in place until POD #1  Patient able to tolerate oral intake:  Yes, tolerating a regular diet  Pain has adequate pain control using Oral analgesics:  Yes, given oxy prn for pain

## 2019-08-27 NOTE — PROGRESS NOTES
Patient vital signs are at baseline: Yes  Patient able to ambulate as they were prior to admission or with assist devices provided by therapies during their stay:  Yes  Patient MUST void prior to discharge:  No,  Reason:  No, DTV  Patient able to tolerate oral intake:  Yes  Pain has adequate pain control using Oral analgesics:  Yes    Planning on discharging to home with wife about 12 noon, after am PT

## 2019-08-27 NOTE — PROGRESS NOTES
Patient vital signs are at baseline: Yes  Patient able to ambulate as they were prior to admission or with assist devices provided by therapies during their stay:  Yes  Patient MUST void prior to discharge:  Yes  Patient able to tolerate oral intake:  Yes  Pain has adequate pain control using Oral analgesics:  Yes     Reviewed discharge instructions and medications with patient and wife. Questions answered. Patient discharged to home with wife, discharge instructions, medications (Asprin, Senna, Tylenol, Ibuprofen, Vistaril, Oxycodone), and belongings.

## 2019-08-27 NOTE — PLAN OF CARE
Patient vital signs are at baseline: Yes  Patient able to ambulate as they were prior to admission or with assist devices provided by therapies during their stay:  Yes  Patient MUST void prior to discharge:  Yes due to void, abraham out at 0605.  Patient able to tolerate oral intake:  Yes  Pain has adequate pain control using Oral analgesics:  Yes

## 2019-08-27 NOTE — PLAN OF CARE
Patient vital signs are at baseline: Yes  Patient able to ambulate as they were prior to admission or with assist devices provided by therapies during their stay:  Yes  Patient MUST void prior to discharge:  Yes. Currently has abraham. Will remove around 0600.  Patient able to tolerate oral intake:  Yes  Pain has adequate pain control using Oral analgesics:  Yes

## 2019-08-27 NOTE — PLAN OF CARE
Discharge Planner PT   Patient plan for discharge: Home with OPPT starting Thursday  Current status: Pt has progressed well, reviewed mobility training and HEP.  SBA with all mobiltiy skills using FWW, good quad control, pain rated 3/10. Amb x 200+ft with good tolerance.  Reviewed all ADL modifications, spouse available for light support as needed.  Barriers to return to prior living situation: None anticipated  Recommendations for discharge: Home with OPPT  Rationale for recommendations: Has progressed well, has met sufficient mobility goals for safe discharge home today. IP PT signing off, will continue with OP PT.       Entered by: Jaleel Robles 08/27/2019 10:58 AM       Physical Therapy Discharge Summary    Reason for therapy discharge:    Discharged to home with outpatient therapy.  All goals and outcomes met, no further needs identified.    Progress towards therapy goal(s). See goals on Care Plan in [facility] electronic health record for goal details.  Goals met    Therapy recommendation(s):    Continued therapy is recommended.  Rationale/Recommendations:  HEP and OP PT to progress ROM, strength, mobility status s/p TKA..  Continue home exercise program.

## 2019-08-27 NOTE — PROGRESS NOTES
"Orthopedic Surgery  8/27/2019  POD 1    S: Patient voices no unexpected ortho complaints today. Denies chest pain or shortness of breath. Did well overnight. Ambulated last miguel.    O: Blood pressure 108/70, pulse 53, temperature 98.5  F (36.9  C), temperature source Oral, resp. rate 12, height 1.88 m (6' 2\"), weight 119.3 kg (263 lb), SpO2 94 %.  Lab Results   Component Value Date    HGB 11.8 08/27/2019     No results found for: INR  I/O last 3 completed shifts:  In: 5872 [P.O.:1540; I.V.:4332]  Out: 4405 [Urine:3600; Drains:780; Blood:25]  Distal extremity CMSI bilaterally.  Calves are negative bilaterally, both soft and nontender.  The dressing is C/D/I.      A: Mr. Mosqueda is doing well status post Procedure(s):  Left total knee arthroplasty.    P: Continue physical therapy. Continue DVT pphx with ASA due to high mobility and low risk factors for DVT. Anticipate discharge to home later today barring any medical issues.    Discharge orders from Ortho completed, including diet, WBAT, Dressing care, activity level and Rx signed.    Lynda Patel PA-C  [phone]  "

## 2019-08-27 NOTE — CONSULTS
Care Transitions Team: Following for CC, discharge planning, and disposition.       Per chart review, CI care plan and PT assess/rec's pt is noted to be aligned with [facility] plan for discharge,  to home with support of spouse  Pt is anticipating  Outpt PT  @ [facility] [place], to be scheduled by pt.      Pt will schedule Ortho  follow up appointment   Ortho follow up appointment has been scheduled for 9/5/19 at 1000.      Will follow in collaboration with [facility] EULALIA Sheppard  [facility] -[phone] [facility] for discharge planning.

## 2019-08-29 ENCOUNTER — DOCUMENTATION ONLY (OUTPATIENT)
Dept: OTHER | Facility: CLINIC | Age: 69
End: 2019-08-29

## 2019-09-04 ENCOUNTER — MYC MEDICAL ADVICE (OUTPATIENT)
Dept: FAMILY MEDICINE | Facility: CLINIC | Age: 69
End: 2019-09-04

## 2019-09-04 DIAGNOSIS — D62 ACUTE POSTHEMORRHAGIC ANEMIA: Primary | ICD-10-CM

## 2019-09-05 DIAGNOSIS — D62 ACUTE POSTHEMORRHAGIC ANEMIA: ICD-10-CM

## 2019-09-05 LAB — HEMOGLOBIN: 13.4 G/DL (ref 13.3–17.7)

## 2019-09-05 PROCEDURE — 36415 COLL VENOUS BLD VENIPUNCTURE: CPT | Performed by: FAMILY MEDICINE

## 2019-09-05 PROCEDURE — 85018 HEMOGLOBIN: CPT | Performed by: FAMILY MEDICINE

## 2019-09-12 ENCOUNTER — MYC MEDICAL ADVICE (OUTPATIENT)
Dept: FAMILY MEDICINE | Facility: CLINIC | Age: 69
End: 2019-09-12

## 2019-10-02 ENCOUNTER — HEALTH MAINTENANCE LETTER (OUTPATIENT)
Age: 69
End: 2019-10-02

## 2019-10-03 ENCOUNTER — OFFICE VISIT (OUTPATIENT)
Dept: FAMILY MEDICINE | Facility: CLINIC | Age: 69
End: 2019-10-03

## 2019-10-03 VITALS
SYSTOLIC BLOOD PRESSURE: 118 MMHG | HEART RATE: 57 BPM | WEIGHT: 261 LBS | DIASTOLIC BLOOD PRESSURE: 80 MMHG | TEMPERATURE: 97.7 F | BODY MASS INDEX: 33.51 KG/M2 | OXYGEN SATURATION: 96 %

## 2019-10-03 DIAGNOSIS — G62.9 NEUROPATHY: ICD-10-CM

## 2019-10-03 DIAGNOSIS — Z23 NEED FOR VACCINATION: ICD-10-CM

## 2019-10-03 DIAGNOSIS — E78.2 MIXED HYPERLIPIDEMIA: ICD-10-CM

## 2019-10-03 DIAGNOSIS — E03.9 ACQUIRED HYPOTHYROIDISM: Primary | ICD-10-CM

## 2019-10-03 DIAGNOSIS — N52.9 IMPOTENCE OF ORGANIC ORIGIN: ICD-10-CM

## 2019-10-03 DIAGNOSIS — M1A.9XX0 CHRONIC GOUT WITHOUT TOPHUS, UNSPECIFIED CAUSE, UNSPECIFIED SITE: ICD-10-CM

## 2019-10-03 DIAGNOSIS — K21.9 GASTROESOPHAGEAL REFLUX DISEASE, ESOPHAGITIS PRESENCE NOT SPECIFIED: ICD-10-CM

## 2019-10-03 LAB
CHOLEST SERPL-MCNC: 196 MG/DL (ref 0–199)
CHOLEST/HDLC SERPL: 5 {RATIO} (ref 0–5)
HDLC SERPL-MCNC: 36 MG/DL (ref 40–150)
LDLC SERPL CALC-MCNC: 113 MG/DL (ref 0–130)
TRIGL SERPL-MCNC: 236 MG/DL (ref 0–149)

## 2019-10-03 PROCEDURE — 90686 IIV4 VACC NO PRSV 0.5 ML IM: CPT | Performed by: FAMILY MEDICINE

## 2019-10-03 PROCEDURE — 36415 COLL VENOUS BLD VENIPUNCTURE: CPT | Performed by: FAMILY MEDICINE

## 2019-10-03 PROCEDURE — 90670 PCV13 VACCINE IM: CPT | Performed by: FAMILY MEDICINE

## 2019-10-03 PROCEDURE — G0009 ADMIN PNEUMOCOCCAL VACCINE: HCPCS | Performed by: FAMILY MEDICINE

## 2019-10-03 PROCEDURE — 99214 OFFICE O/P EST MOD 30 MIN: CPT | Mod: 25 | Performed by: FAMILY MEDICINE

## 2019-10-03 PROCEDURE — G0008 ADMIN INFLUENZA VIRUS VAC: HCPCS | Performed by: FAMILY MEDICINE

## 2019-10-03 PROCEDURE — 80061 LIPID PANEL: CPT | Performed by: FAMILY MEDICINE

## 2019-10-03 RX ORDER — TADALAFIL 5 MG/1
5 TABLET ORAL EVERY 24 HOURS
Qty: 30 TABLET | Refills: 2 | Status: SHIPPED | OUTPATIENT
Start: 2019-10-03 | End: 2020-08-17

## 2019-10-03 RX ORDER — LEVOTHYROXINE SODIUM 100 UG/1
TABLET ORAL
Qty: 90 TABLET | Refills: 3 | Status: SHIPPED | OUTPATIENT
Start: 2019-10-03 | End: 2020-08-18 | Stop reason: DRUGHIGH

## 2019-10-03 RX ORDER — TADALAFIL 20 MG/1
20 TABLET ORAL DAILY PRN
Qty: 20 TABLET | Status: CANCELLED | OUTPATIENT
Start: 2019-10-03

## 2019-10-03 RX ORDER — ALLOPURINOL 100 MG/1
100 TABLET ORAL DAILY
Qty: 90 TABLET | Refills: 3 | Status: SHIPPED | OUTPATIENT
Start: 2019-10-03 | End: 2021-08-02

## 2019-10-03 RX ORDER — PREGABALIN 150 MG/1
300 CAPSULE ORAL 2 TIMES DAILY
Qty: 360 CAPSULE | Refills: 1 | Status: SHIPPED | OUTPATIENT
Start: 2019-10-03 | End: 2020-06-05

## 2019-10-03 RX ORDER — OMEPRAZOLE 40 MG/1
40 CAPSULE, DELAYED RELEASE ORAL DAILY
Qty: 90 CAPSULE | Refills: 3 | Status: SHIPPED | OUTPATIENT
Start: 2019-10-03 | End: 2020-08-17

## 2019-10-03 NOTE — PROGRESS NOTES
Subjective     Rojelio Mosqueda is a 68 year old male who presents to clinic today for the following health issues:    HPI   Hypothyroidism Follow-up      Since last visit, patient describes the following symptoms: Weight stable, no hair loss, no skin changes, no constipation, no loose stools       How many days per week do you miss taking your medication? 0        PROBLEMS TO ADD ON...  History of gout: takes allopurinol  Check uric acid level    History of mixed hyperlipidemia- fasting labs today    Recovering from knee surgery- able to walk a half of a mile six weeks post op    Patient Active Problem List   Diagnosis     ESOPHAGEAL REFLUX/ hiatal hernia     Family history of diabetes mellitus     Impotence of organic origin     Peripheral neuropathy     Elevated blood uric acid level     Hypothyroidism     Health Care Home     Arthritis of knee     S/P total knee arthroplasty     Chronic gout without tophus, unspecified cause, unspecified site     Sinus bradycardia     Past Surgical History:   Procedure Laterality Date     ARTHROPLASTY KNEE Right 10/8/2018    Procedure: ARTHROPLASTY KNEE;  1.  Right total knee arthroplasty.   2.  Intra-articular corticosteroid injection, left knee. ;  Surgeon: Javi Torres MD;  Location: [facility] OR     ARTHROPLASTY KNEE Left 8/26/2019    Procedure: Left total knee arthroplasty;  Surgeon: Javi Torres MD;  Location:  OR      NONSPECIFIC PROCEDURE  2/2011    upper endos/ mod hiatal hernia/ antireflux tx     COLONOSCOPY  2007    [place] office     HERNIA REPAIR      under the age of [age]     INJECT STEROID (LOCATION) Left 10/8/2018    Procedure: INJECT STEROID (LOCATION);;  Surgeon: Javi Torres MD;  Location:  OR     lt. foot-surgery         Social History     Tobacco Use     Smoking status: Former Smoker     Packs/day: 1.00     Years: 15.00     Pack years: 15.00     Types: Cigarettes     Last attempt to quit: 1/1/2004     Years since quitting: 15.7      Smokeless tobacco: Never Used     Tobacco comment: Smoked off & on   Substance Use Topics     Alcohol use: Yes     Alcohol/week: 2.5 standard drinks     Types: 3 Standard drinks or equivalent per week     Comment: 2-3 days per week 1-2 drinks per time     Family History   Problem Relation Age of Onset     Diabetes Mother      Cancer No family hx of      Heart Disease No family hx of          Current Outpatient Medications   Medication Sig Dispense Refill     ACIDOPHILUS LACTOBACILLUS PO Take 700 mg by mouth daily        allopurinol (ZYLOPRIM) 100 MG tablet Take 1 tablet (100 mg) by mouth daily 90 tablet 3     carboxymethylcellulose PF (REFRESH PLUS) 0.5 % ophthalmic solution Place 2 drops into both eyes 3 times daily as needed for dry eyes       Cholecalciferol (VITAMIN D-3) 5000 units TABS Take 5,000 Units by mouth daily        levothyroxine (SYNTHROID/LEVOTHROID) 100 MCG tablet TAKE 1 TABLET (100 MCG) BY MOUTH DAILY 90 tablet 3     mometasone (NASONEX) 50 MCG/ACT spray Spray 2 sprays into both nostrils daily 3 Box 3     multivitamin w/minerals (MULTI-VITAMIN) tablet Take 1 tablet by mouth daily Pt uses [employer] brand       NONFORMULARY Yang-remedy B12 one daily       NONFORMULARY A-reds2 - 2 capsules daily       Omega-3 Fatty Acids (OMEGA-3 PO) Take 1,000 mg by mouth daily        omeprazole (PRILOSEC) 40 MG DR capsule Take 1 capsule (40 mg) by mouth daily Take 30-60 minutes before a meal. 90 capsule 3     pregabalin (LYRICA) 150 MG capsule Take 2 capsules (300 mg) by mouth 2 times daily 360 capsule 1     tadalafil (CIALIS) 20 MG tablet Take 1 tablet (20 mg) by mouth daily as needed 20 tablet prn     tadalafil (CIALIS) 5 MG tablet Take 1 tablet (5 mg) by mouth every 24 hours 30 tablet 2        Problems Reviewed and updated as needed this visit by Provider         Review of Systems   ROS COMP: Constitutional, HEENT, cardiovascular, pulmonary, GI, , musculoskeletal, neuro, skin, endocrine and psych systems are  negative, except as otherwise noted.      Objective    /80 (BP Location: Right arm, Patient Position: Sitting, Cuff Size: Adult Large)   Pulse 57   Temp 97.7  F (36.5  C) (Oral)   Wt 118.4 kg (261 lb)   SpO2 96%   BMI 33.51 kg/m    There is no height or weight on file to calculate BMI.  Physical Exam   GENERAL: healthy, alert and no distress  NECK: no adenopathy, no asymmetry, masses, or scars and thyroid normal to palpation  RESP: lungs clear to auscultation - no rales, rhonchi or wheezes  CV: regular rate and rhythm, normal S1 S2, no S3 or S4, no murmur, click or rub, no peripheral edema and peripheral pulses strong  MS: no gross musculoskeletal defects noted, no edema    Diagnostic Test Results:  Labs reviewed in Epic  Results for orders placed or performed in visit on 10/03/19   TSH ([facility])   Result Value Ref Range    TSH 3.27 0.40 - 4.50 mIU/L   T4 free ([facility])   Result Value Ref Range    T4 Free, Non-Dialysis 0.9 0.8 - 1.8 ng/dL   Lipid Panel ([facility])   Result Value Ref Range    Cholesterol 196 0 - 199 mg/dL    Triglycerides 236 (A) 0 - 149 mg/dL    HDL Cholesterol 36 (A) 40 - 150 mg/dL    LDL Cholesterol Direct 113 0 - 130 mg/dL    Cholesterol/HDL Ratio 5 0 - 5   URIC ACID ([facility])   Result Value Ref Range    Uric Acid 6.2 4.0 - 8.0 mg/dL           Assessment & Plan    (E03.9) Acquired hypothyroidism  (primary encounter diagnosis)  Comment: thyroid function tests are in the therapeutic range  Levothyroxine refilled at current dose  Plan: levothyroxine (SYNTHROID/LEVOTHROID) 100 MCG         tablet, TSH ([facility]), T4 free ([facility]), VENOUS         COLLECTION            (K21.9) Gastroesophageal reflux disease, esophagitis presence not specified  Comment: symptoms controlled  Plan: omeprazole (PRILOSEC) 40 MG DR capsule            (G62.9) Neuropathy  Comment:  Idiopathic- symptoms are stable  Plan: pregabalin (LYRICA) 150 MG capsule            (N52.9) Impotence of organic origin  Comment:  Discussion of sheila  "RX or Good RX for discounted medication  Plan: tadalafil (CIALIS) 5 MG tablet            (E78.2) Mixed hyperlipidemia  Comment: Low HDL, high triglycerides  Resume normal activity when recovers from TKA  Plan: VENOUS COLLECTION, Lipid Panel ([facility])            (M1A.9XX0) Chronic gout without tophus, unspecified cause, unspecified site  Comment: uric acid near goal- allopurinol refilled at current dose  Plan: VENOUS COLLECTION, URIC ACID ([facility]),         allopurinol (ZYLOPRIM) 100 MG tablet            (Z23) Need for vaccination  Comment:   Plan: PNEUMOCOCCAL CONJ VACCINE 13 VALENT IM                  BMI:   Estimated body mass index is 33.51 kg/m  as calculated from the following:    Height as of 8/26/19: 1.88 m (6' 2\").    Weight as of this encounter: 118.4 kg (261 lb).   Weight management plan: Discussed healthy diet and exercise guidelines        FUTURE APPOINTMENTS:       - Follow-up visit in one year for refill of allopurinol and thyroid medication  No follow-ups on file.    Viviane Ann MD  [facility]          "

## 2019-10-03 NOTE — PATIENT INSTRUCTIONS
[employer]  [phone]    GOOD RX: keke    If need medical information - go to [email or web address]

## 2019-10-03 NOTE — NURSING NOTE
Chief Complaint   Patient presents with     Recheck Medication     fasting     Pre-visit Screening:  Immunizations:  not up to date - Pneumo  Colonoscopy:  is up to date  Mammogram: NA  Asthma Action Test/Plan:  NA  PHQ9:  NA  GAD7:  NA  Questioned patient about current smoking habits Pt. quit smoking some time ago.  Ok to leave detailed message on voice mail for today's visit only yes, phone # [phone]

## 2019-10-04 LAB
T4, FREE, NON-DIALYSIS - QUEST: 0.9 NG/DL (ref 0.8–1.8)
TSH SERPL-ACNC: 3.27 MIU/L (ref 0.4–4.5)
URATE SERPL-MCNC: 6.2 MG/DL (ref 4–8)

## 2019-10-09 ENCOUNTER — MYC MEDICAL ADVICE (OUTPATIENT)
Dept: FAMILY MEDICINE | Facility: CLINIC | Age: 69
End: 2019-10-09

## 2019-10-14 ENCOUNTER — TRANSFERRED RECORDS (OUTPATIENT)
Dept: FAMILY MEDICINE | Facility: CLINIC | Age: 69
End: 2019-10-14

## 2019-12-16 ENCOUNTER — HEALTH MAINTENANCE LETTER (OUTPATIENT)
Age: 69
End: 2019-12-16

## 2020-03-03 ENCOUNTER — MYC MEDICAL ADVICE (OUTPATIENT)
Dept: FAMILY MEDICINE | Facility: CLINIC | Age: 70
End: 2020-03-03

## 2020-03-03 DIAGNOSIS — D35.01 ADRENAL ADENOMA, RIGHT: Primary | ICD-10-CM

## 2020-03-03 DIAGNOSIS — R93.5 ABNORMAL CT OF THE ABDOMEN: ICD-10-CM

## 2020-03-16 ENCOUNTER — TRANSFERRED RECORDS (OUTPATIENT)
Dept: FAMILY MEDICINE | Facility: CLINIC | Age: 70
End: 2020-03-16

## 2020-03-20 ENCOUNTER — TRANSFERRED RECORDS (OUTPATIENT)
Dept: FAMILY MEDICINE | Facility: CLINIC | Age: 70
End: 2020-03-20

## 2020-03-23 ENCOUNTER — TRANSFERRED RECORDS (OUTPATIENT)
Dept: FAMILY MEDICINE | Facility: CLINIC | Age: 70
End: 2020-03-23

## 2020-03-25 ENCOUNTER — TRANSFERRED RECORDS (OUTPATIENT)
Dept: FAMILY MEDICINE | Facility: CLINIC | Age: 70
End: 2020-03-25

## 2020-03-25 ENCOUNTER — OFFICE VISIT (OUTPATIENT)
Dept: FAMILY MEDICINE | Facility: CLINIC | Age: 70
End: 2020-03-25

## 2020-03-25 VITALS
SYSTOLIC BLOOD PRESSURE: 120 MMHG | HEART RATE: 57 BPM | WEIGHT: 253 LBS | OXYGEN SATURATION: 97 % | RESPIRATION RATE: 20 BRPM | TEMPERATURE: 97.9 F | BODY MASS INDEX: 32.47 KG/M2 | HEIGHT: 74 IN | DIASTOLIC BLOOD PRESSURE: 82 MMHG

## 2020-03-25 DIAGNOSIS — R10.13 EPIGASTRIC PAIN: Primary | ICD-10-CM

## 2020-03-25 DIAGNOSIS — E27.8 ADRENAL MASS (H): ICD-10-CM

## 2020-03-25 DIAGNOSIS — R05.3 CHRONIC COUGH: ICD-10-CM

## 2020-03-25 DIAGNOSIS — R35.1 NOCTURIA: ICD-10-CM

## 2020-03-25 LAB
ALBUMIN SERPL-MCNC: 4.3 G/DL (ref 3.6–5.1)
ALP SERPL-CCNC: 68 U/L (ref 33–130)
ALT 1742-6: 39 U/L (ref 0–32)
AST 1920-8: 16 U/L (ref 0–35)
BILIRUB SERPL-MCNC: 0.6 MG/DL (ref 0.2–1.2)
BILIRUBIN DIRECT: 0.3 MG/DL (ref 0.1–0.4)
ERYTHROCYTE [DISTWIDTH] IN BLOOD BY AUTOMATED COUNT: 13.3 %
HCT VFR BLD AUTO: 44.1 % (ref 40–53)
HEMOGLOBIN: 14.6 G/DL (ref 13.3–17.7)
MCH RBC QN AUTO: 31.3 PG (ref 26–33)
MCHC RBC AUTO-ENTMCNC: 33.1 G/DL (ref 31–36)
MCV RBC AUTO: 94.5 FL (ref 78–100)
PLATELET COUNT - QUEST: 203 10^9/L (ref 150–375)
PROT SERPL-MCNC: 7.2 G/DL (ref 6.1–8.1)
RBC # BLD AUTO: 4.67 10*12/L (ref 4.4–5.9)
WBC # BLD AUTO: 6 10*9/L (ref 4–11)

## 2020-03-25 PROCEDURE — 99214 OFFICE O/P EST MOD 30 MIN: CPT | Performed by: PHYSICIAN ASSISTANT

## 2020-03-25 PROCEDURE — 85027 COMPLETE CBC AUTOMATED: CPT | Performed by: PHYSICIAN ASSISTANT

## 2020-03-25 PROCEDURE — 36415 COLL VENOUS BLD VENIPUNCTURE: CPT | Performed by: PHYSICIAN ASSISTANT

## 2020-03-25 PROCEDURE — 80076 HEPATIC FUNCTION PANEL: CPT | Performed by: PHYSICIAN ASSISTANT

## 2020-03-25 ASSESSMENT — MIFFLIN-ST. JEOR: SCORE: 1992.35

## 2020-03-25 NOTE — PROGRESS NOTES
"SUBJECTIVE:                                                    Rojelio Mosqueda is a 69 year old male who presents to clinic today for the following health issues:    Clem Diego PA-C, assisted with H&P and documentation of today's visit with the patient's permission.       Chief Complaint   Patient presents with     Abdominal Pain     started out as pressure in upper abdominal area, has seen GI but did not find anything, now pain is more in lower abdominal area, happens more in the morning        Patient is on omeprazole for GERD and takes this daily. Pain/bloating started in February and was primarily in the epigastric area. Pt states this is mild and lasts approximately 1 hour. Saw [facility] group 3/20, did endoscopy and colonoscopy with no concerning results. [facility] ordered gallbladder US which came back normal. CT abdomen/pelvis completed, showed right adrenal mass 1.7x2cm. Now patient reports lower LUCIAN abdominal pain/nausea which is worse in the morning and improves with food. States he is having a BM every 2-3 days and has for \"a long time\". Has tried AlkaSeltzer and 7up with minor relief of symptoms. Pt was started on amitriptyline which has helped symptoms overall. Reports urinating once/night, has not had PSA in over 1 year.      Patient also describes a cough that has lasted 5 weeks that is non-productive. Denies shortness of breath, fever, chills. He describes allergies to cats and dust mites and denies seasonal allergies in the past. He endorses that he had a new kitchen installed in his home in [place], [place] around 5 weeks ago and notes increased dust due to this. Does have 15 pack/year history, quit smoking 2004.      ROS:  Constitutional: NEGATIVE for fever, chills, change in weight  Eyes: NEGATIVE for vision changes or irritation  Ears: NEGATIVE for pain, discharge, decreased hearing  Nose: NEGATIVE for rhinorrhea, epistaxis or congestion  Mouth: NEGATIVE for ulcerations, sore throat.   R: Positive for cough, " NEGATIVE for SOB  CV: NEGATIVE for chest pain, palpitations or peripheral edema  GI: Positive for nausea, epigastric/lower LUCIAN abdominal pain. Negative for changes in bowel habits  : NEGATIVE for frequency, dysuria, or hematuria  Neuro: NEGATIVE for headaches, weakness, syncope        BP Readings from Last 3 Encounters:   20 120/82   10/03/19 118/80   19 122/69    Wt Readings from Last 3 Encounters:   20 114.8 kg (253 lb)   10/03/19 118.4 kg (261 lb)   19 119.3 kg (263 lb)            Patient Active Problem List   Diagnosis     ESOPHAGEAL REFLUX/ hiatal hernia     Family history of diabetes mellitus     Impotence of organic origin     Peripheral neuropathy     Elevated blood uric acid level     Hypothyroidism     Health Care Home     Arthritis of knee     S/P total knee arthroplasty     Chronic gout without tophus, unspecified cause, unspecified site     Sinus bradycardia     Past Surgical History:   Procedure Laterality Date     ARTHROPLASTY KNEE Right 10/8/2018    Procedure: ARTHROPLASTY KNEE;  1.  Right total knee arthroplasty.   2.  Intra-articular corticosteroid injection, left knee. ;  Surgeon: Javi Torres MD;  Location: [facility] OR     ARTHROPLASTY KNEE Left 2019    Procedure: Left total knee arthroplasty;  Surgeon: Javi Torres MD;  Location: [facility] OR     C NONSPECIFIC PROCEDURE  2011    upper endos/ mod hiatal hernia/ antireflux tx     COLONOSCOPY      [place] [facility]     HERNIA REPAIR      under the age of [age]     INJECT STEROID (LOCATION) Left 10/8/2018    Procedure: INJECT STEROID (LOCATION);;  Surgeon: Javi Torres MD;  Location: [facility] OR     lt. foot-surgery         Social History     Tobacco Use     Smoking status: Former Smoker     Packs/day: 1.00     Years: 15.00     Pack years: 15.00     Types: Cigarettes     Last attempt to quit: 2004     Years since quittin.2     Smokeless tobacco: Never Used     Tobacco comment: Smoked off & on    Substance Use Topics     Alcohol use: Yes     Alcohol/week: 2.5 standard drinks     Types: 3 Standard drinks or equivalent per week     Comment: 2-3 days per week 1-2 drinks per time     Family History   Problem Relation Age of Onset     Diabetes Mother      Cancer No family hx of      Heart Disease No family hx of          Current Outpatient Medications   Medication Sig Dispense Refill     ACIDOPHILUS LACTOBACILLUS PO Take 700 mg by mouth daily        allopurinol (ZYLOPRIM) 100 MG tablet Take 1 tablet (100 mg) by mouth daily 90 tablet 3     carboxymethylcellulose PF (REFRESH PLUS) 0.5 % ophthalmic solution Place 2 drops into both eyes 3 times daily as needed for dry eyes       Cholecalciferol (VITAMIN D-3) 5000 units TABS Take 5,000 Units by mouth daily        levothyroxine (SYNTHROID/LEVOTHROID) 100 MCG tablet TAKE 1 TABLET (100 MCG) BY MOUTH DAILY 90 tablet 3     mometasone (NASONEX) 50 MCG/ACT spray Spray 2 sprays into both nostrils daily 3 Box 3     multivitamin w/minerals (MULTI-VITAMIN) tablet Take 1 tablet by mouth daily Pt uses [employer] brand       NONFORMULARY Yang-remedy B12 one daily       NONFORMULARY A-reds2 - 2 capsules daily       Omega-3 Fatty Acids (OMEGA-3 PO) Take 1,000 mg by mouth daily        omeprazole (PRILOSEC) 40 MG DR capsule Take 1 capsule (40 mg) by mouth daily Take 30-60 minutes before a meal. 90 capsule 3     pregabalin (LYRICA) 150 MG capsule Take 2 capsules (300 mg) by mouth 2 times daily 360 capsule 1     tadalafil (CIALIS) 20 MG tablet Take 1 tablet (20 mg) by mouth daily as needed 20 tablet prn     tadalafil (CIALIS) 5 MG tablet Take 1 tablet (5 mg) by mouth every 24 hours 30 tablet 2       Allergies   Allergen Reactions     Cat Hair Extract Shortness Of Breath     Dust Mites Itching     No Known Drug Allergies        OBJECTIVE:                                                    /82 (BP Location: Right arm, Patient Position: Sitting, Cuff Size: Adult Large)   Pulse 57    "Temp 97.9  F (36.6  C) (Oral)   Resp 20   Ht 1.88 m (6' 2\")   Wt 114.8 kg (253 lb)   SpO2 97%   BMI 32.48 kg/m   Body mass index is 32.48 kg/m .     GENERAL: alert and no distress  HEAD: Normocephalic, atraumatic  EYES: Eyes grossly normal to inspection, extraocular movements - intact  NOSE: No discharge noted  NECK: no tenderness, no adenopathy, no asymmetry, no masses, no stiffness; thyroid- normal to palpation  RESP: lungs clear to auscultation - no crackles, no rhonchi, no wheezes  CV: regular rates and rhythm, normal S1 S2, no S3 or S4 and no murmur, no click or rub -  Abdomen: hyperactive bowel sounds. Soft, no masses, or organomegaly. Diffuse mild tenderness in LUQ and epigastric area. No guarding, no rebound tenderness.              ASSESSMENT/PLAN:                                                      1. Epigastric pain  Pt has had extensive workup with GI that did not find organic cause of pain. Recommended continuing omeprazole and adding Pepcid. Will check labs to look for abnormalities. Hyperactive bowel sounds may suggest viral illness, will monitor and see if symptoms resolve over time.  - HEMOGRAM/PLATELET  - Lipase ([facility])  - Amylase ([facility])  - Hepatic Panel ([facility])  - VENOUS COLLECTION    2. Adrenal mass (H)  CT adrenal protocol ordered  - HEMOGRAM/PLATELET  - Lipase ([facility])  - Amylase ([facility])  - Hepatic Panel ([facility])  - VENOUS COLLECTION    3. Chronic cough  CT chest ordered    4. Nocturia  Rule out prostate disease as cause of lower abdominal pain, will check PSA today.  - HCL PSA, SCREENING ([facility])      Will Follow-up with patient next week pending CT results  Will refer to endo to have adrenal mass evaluated.      Gricel Strickland PA-C  [facility]    "

## 2020-03-25 NOTE — NURSING NOTE
Chief Complaint   Patient presents with     Abdominal Pain     started out as pressure in upper abdominal area, has seen GI but did not find anything, now pain is more in lower abdominal area, happens more in the morning      Pre-visit Screening:  Immunizations:  not up to date - due for tetanus   Colonoscopy:  is up to date  Mammogram: NA  Asthma Action Test/Plan:  NA  PHQ9:  NA  GAD7:  NA  Questioned patient about current smoking habits Pt. quit smoking some time ago.  Ok to leave detailed message on voice mail for today's visit only Yes, phone # [phone]

## 2020-03-25 NOTE — Clinical Note
Pt getting Follow-up CT on Mon or Tues for adrenal mass and chest CT.  Can you please call him with result and refer to Endo? I spoke with GI and PA there said if abd pain persists they would recommend gastric emptying study.   Please call my cell if you need anything.  Thanks!!!

## 2020-03-26 LAB
ABBOTT PSA - QUEST: 1.3 NG/ML
AMYLASE SERPL-CCNC: 28 U/L (ref 21–101)
LIPASE SERPL-CCNC: 11 U/L (ref 7–60)

## 2020-03-27 ENCOUNTER — MYC MEDICAL ADVICE (OUTPATIENT)
Dept: FAMILY MEDICINE | Facility: CLINIC | Age: 70
End: 2020-03-27

## 2020-04-02 ENCOUNTER — TELEPHONE (OUTPATIENT)
Dept: FAMILY MEDICINE | Facility: CLINIC | Age: 70
End: 2020-04-02

## 2020-04-02 DIAGNOSIS — E27.8 ADRENAL MASS (H): Primary | ICD-10-CM

## 2020-04-02 NOTE — TELEPHONE ENCOUNTER
Reviewed CT scan of the chest negative. Mild cough continues. He has follow up plans, diet and trail amitriptyline to follow with [facility] the next 2 months. After 5/1/2020 will schedule Endocrinology consult on adenoma.

## 2020-04-15 ENCOUNTER — TELEPHONE (OUTPATIENT)
Dept: FAMILY MEDICINE | Facility: CLINIC | Age: 70
End: 2020-04-15

## 2020-04-15 DIAGNOSIS — E27.8 ADRENAL MASS (H): Primary | ICD-10-CM

## 2020-04-15 NOTE — TELEPHONE ENCOUNTER
Spoke with pt gave the information to where he was referred to. Referral says pending? He stated he hasn't gotten a call from the endocrinologist which is very unlike [facility]. Do you think referral went through?    Routing to Melyssa as she is helping with Tg while she is out.    Thanks, Khadijah

## 2020-04-15 NOTE — TELEPHONE ENCOUNTER
Can you try to get a hold of this patient about his Endocrinology  Referral for an adrenal mass? Does he have an appointment?

## 2020-04-27 ENCOUNTER — TRANSFERRED RECORDS (OUTPATIENT)
Dept: FAMILY MEDICINE | Facility: CLINIC | Age: 70
End: 2020-04-27

## 2020-06-01 ENCOUNTER — TRANSFERRED RECORDS (OUTPATIENT)
Dept: FAMILY MEDICINE | Facility: CLINIC | Age: 70
End: 2020-06-01

## 2020-06-08 ENCOUNTER — MYC REFILL (OUTPATIENT)
Dept: FAMILY MEDICINE | Facility: CLINIC | Age: 70
End: 2020-06-08

## 2020-06-08 DIAGNOSIS — G62.9 NEUROPATHY: ICD-10-CM

## 2020-06-08 RX ORDER — PREGABALIN 150 MG/1
300 CAPSULE ORAL 2 TIMES DAILY
Qty: 360 CAPSULE | Refills: 1 | Status: CANCELLED | OUTPATIENT
Start: 2020-06-08

## 2020-06-26 ENCOUNTER — TRANSFERRED RECORDS (OUTPATIENT)
Dept: FAMILY MEDICINE | Facility: CLINIC | Age: 70
End: 2020-06-26

## 2020-07-03 ENCOUNTER — VIRTUAL VISIT (OUTPATIENT)
Dept: ENDOCRINOLOGY | Facility: CLINIC | Age: 70
End: 2020-07-03
Payer: MEDICARE

## 2020-07-03 DIAGNOSIS — R61 GENERALIZED HYPERHIDROSIS: ICD-10-CM

## 2020-07-03 DIAGNOSIS — E27.9 ADRENAL NODULE (H): Primary | ICD-10-CM

## 2020-07-03 PROCEDURE — 99203 OFFICE O/P NEW LOW 30 MIN: CPT | Mod: 95 | Performed by: INTERNAL MEDICINE

## 2020-07-03 NOTE — PROGRESS NOTES
"Rojelio Mosqueda is a 69 year old male who is being evaluated via a billable video visit.      The patient has been notified of following:     \"This video visit will be conducted via a call between you and your physician/provider. We have found that certain health care needs can be provided without the need for an in-person physical exam.  This service lets us provide the care you need with a video conversation.  If a prescription is necessary we can send it directly to your pharmacy.  If lab work is needed we can place an order for that and you can then stop by our lab to have the test done at a later time.    Video visits are billed at different rates depending on your insurance coverage.  Please reach out to your insurance provider with any questions.    If during the course of the call the physician/provider feels a video visit is not appropriate, you will not be charged for this service.\"    Patient has given verbal consent for Video visit? Yes  How would you like to obtain your AVS? Reviewed verbally  Patient would like the video invitation sent by: Text to cell phone: [phone]  Will anyone else be joining your video visit? No      Name: Rojelio Mosqueda is a 69 year old man, seen at the request of Dr. Gill Wei for evaluation of     Chief Complaint   Patient presents with     Thyroid Problem       HPI:  Recent issues:  Here for evaluation of adrenal nodule  Reviewed medical history from patient and Epic chart record        3/2020. Symptoms with \"stomachaches\", substernal pain, cough  Medical evaluation, change from omeprazole to Pepcid  3/31/20 CT chest w/ contrast ([facility]):   Lungs unremarkable   2 cm lipid rich right adrenal adenoma    Previous history:   Adrenal disease: None   Steroid med use: None   Hypertension:  None   Chronic A/C use: None  Recent [facility] labs include:  Lab Results   Component Value Date    TSH 3.27 10/03/2019    T4 0.9 10/03/2019      Recent " symptoms:   Occasional heartburn- less with Pepcid   Sweats often   Some lower back pains  Other chronic illnesses include:   Neuropathy:   Followed by PCP   Depression:    Followed by PCP   Hypothyroidism: Takes levothyroxine med, followed by PCP      Lives in [place], [place]  Sees [facility] for general medicine evaluations.    PMH/PSH:  Past Medical History:   Diagnosis Date     Arthritis      Depressive disorder, not elsewhere classified 7/21/2002     Esophageal reflux      Family history of diabetes mellitus      GERD (gastroesophageal reflux disease)      Idiopathic neuropathy      Peripheral neuropathy      Thyroid disease      Past Surgical History:   Procedure Laterality Date     ARTHROPLASTY KNEE Right 10/8/2018    Procedure: ARTHROPLASTY KNEE;  1.  Right total knee arthroplasty.   2.  Intra-articular corticosteroid injection, left knee. ;  Surgeon: Javi Torres MD;  Location: [facility]     ARTHROPLASTY KNEE Left 8/26/2019    Procedure: Left total knee arthroplasty;  Surgeon: Javi Torres MD;  Location: [facility] OR     C NONSPECIFIC PROCEDURE  2/2011    upper endos/ mod hiatal hernia/ antireflux tx     [facility]OSCOPY  2007    [facility]     HERNIA REPAIR      under the age of [age]     INJECT STEROID (LOCATION) Left 10/8/2018    Procedure: INJECT STEROID (LOCATION);;  Surgeon: Javi Torres MD;  Location: [facility] OR     lt. foot-surgery         Family Hx:  Family History   Problem Relation Age of Onset     Diabetes Mother      Cancer No family hx of      Heart Disease No family hx of          Social Hx:  Social History     Socioeconomic History     Marital status:      Spouse name: Roslyn     Number of children: 0     Years of education: 15     Highest education level: Not on file   Occupational History     Occupation: Industrial      Employer: [employer]   Social Needs     Financial resource strain: Not on file     Food insecurity     Worry: Not on file      Inability: Not on file     Transportation needs     Medical: Not on file     Non-medical: Not on file   Tobacco Use     Smoking status: Former Smoker     Packs/day: 1.00     Years: 15.00     Pack years: 15.00     Types: Cigarettes     Last attempt to quit: 2004     Years since quittin.5     Smokeless tobacco: Never Used     Tobacco comment: Smoked off & on   Substance and Sexual Activity     Alcohol use: Yes     Alcohol/week: 2.5 standard drinks     Types: 3 Standard drinks or equivalent per week     Comment: 2-3 days per week 1-2 drinks per time     Drug use: No     Sexual activity: Yes     Partners: Female   Lifestyle     Physical activity     Days per week: Not on file     Minutes per session: Not on file     Stress: Not on file   Relationships     Social connections     Talks on phone: Not on file     Gets together: Not on file     Attends Mandaen service: Not on file     Active member of club or organization: Not on file     Attends meetings of clubs or organizations: Not on file     Relationship status: Not on file     Intimate partner violence     Fear of current or ex partner: Not on file     Emotionally abused: Not on file     Physically abused: Not on file     Forced sexual activity: Not on file   Other Topics Concern      Service Not Asked     Blood Transfusions Not Asked     Caffeine Concern Not Asked     Occupational Exposure Not Asked     Hobby Hazards Not Asked     Sleep Concern Not Asked     Stress Concern Not Asked     Weight Concern Not Asked     Special Diet Not Asked     Back Care Not Asked     Exercise Yes     Bike Helmet Not Asked     Seat Belt Yes     Self-Exams No     Parent/sibling w/ CABG, MI or angioplasty before 65F 55M? Yes     Comment: father had MI   Social History Narrative     Not on file          MEDICATIONS:  has a current medication list which includes the following prescription(s): lactobacillus, amitriptyline, carboxymethylcellulose pf, vitamin d-3,  levothyroxine, mometasone, multivitamin w/minerals, NONFORMULARY, pregabalin, tadalafil, allopurinol, NONFORMULARY, omega-3 fatty acids, omeprazole, and tadalafil.    ROS:     ROS: 10 point ROS neg other than the symptoms noted above in the HPI.    GENERAL: mild fatigue, wt stable; denies fevers, chills, night sweats.    HEENT: no dysphagia, odonophagia, diplopia, neck pain  THYROID:  no apparent hyper or hypothyroid symptoms  CV: no chest pain, pressure, palpitations  LUNGS: no SOB, RONQUILLO, cough, wheezing   ABDOMEN: no diarrhea, constipation, abdominal pain  EXTREMITIES: no rashes, ulcers, edema  NEUROLOGY: no headaches, denies changes in vision, tingling, extremitiy numbness   MSK: low back pain; denies muscle weakness  SKIN: increased sweating; no rashes or lesions  : decreased erection function, occasional nocturia  PSYCH:  stable mood, no significant anxiety or depression  ENDOCRINE: no heat or cold intolerance    Physical Exam (visual exam)  VS:  no vital signs taken for video visit  GENERAL: healthy, alert and NAD, well dressed, answering questions appropriately  EYES: eyes grossly normal to inspection, conjunctivae and sclerae normal, no exophthalmos or proptosis  THYROID:  no apparent nodules or goiter  LUNGS: no audible wheeze, cough or visible cyanosis, no visible retractions or increased work of breathing  ABDOMEN: abdomen not evaluated  EXTREMITIES: no hand tremors, limited exam  NEUROLOGY: CN grossly intact, mentation intact and speech normal   PSYCH: mentation appears normal, affect normal/bright, judgement and insight intact, normal speech and appearance well groomed      LABS:    All pertinent notes, labs, and images personally reviewed by me.     A/P:  Encounter Diagnoses   Name Primary?     Adrenal nodule (H) Yes     Generalized hyperhidrosis        Comments:   Reviewed health history and adrenal issues.  Suspect benign non-functioning adrenal adenoma    Plan:  Discussed general issues with  adrenal gland diseases and management  Reviewed adrenal gland anatomy and hormone physiology  Discussed lab tests used to assess patient adrenal hormone levels  Reviewed adrenal gland CT scan imaging    Recommend:  Need additional lab and imaging information with the adrenal nodule  Plan lab tests for testosterone, cortisol, BMP, 24 hr urine cortisol, total metanephrines  Needs CT scan of adrenal glands with/without contrast   Call [phone] to schedule the procedure at [facility] radiology department  Monitor for symptom changes  If lab and repeat adrenal CT scan results unremarkable, plan repeat non-contrast CT and 24-hr urine cortisol in 1 yr for comparison  Will summarize results and conclusions when these tests completed.     Addressed patient questions today    Future labs ordered today:   Orders Placed This Encounter   Procedures     CT Abdomen w/o & w Contrast     Basic metabolic panel     Testosterone total     Cortisol free urine     Metanephrine random or 24 hr urine     Adrenal corticotropin     Aldosterone     Renin activity     Aldosterone Renin Ratio     Cortisol     Radiology/Consults ordered today: CT ABDOMEN W/O & W CONTRAST    More than 50% of the time spent with Mr. Mosqueda on counseling / coordinating his care.  Total appointment time was 40  minutes.    Follow-up:  BELÉN See MD, MS  Endocrinology  [facility]    CC: LANETTE Wei and GINETTE Strickland      Video-Visit Details    Type of service:  Video Visit    Video Start Time: 1:55 pm  Video End Time: 2:35 pm    Originating Location (pt. Location): home    Distant Location (provider location):  [facility]/[place]     Platform used for Video Visit: RafaelaEvercam

## 2020-07-03 NOTE — LETTER
"    7/3/2020         RE: Rojelio Mosqueda  [address]  [facility] [address]        Dear Colleague,    Thank you for referring your patient, Rojelio Mosqueda, to the [facility]. Please see a copy of my visit note below.    .    Rojelio Mosqueda is a 69 year old male who is being evaluated via a billable video visit.      The patient has been notified of following:     \"This video visit will be conducted via a call between you and your physician/provider. We have found that certain health care needs can be provided without the need for an in-person physical exam.  This service lets us provide the care you need with a video conversation.  If a prescription is necessary we can send it directly to your pharmacy.  If lab work is needed we can place an order for that and you can then stop by our lab to have the test done at a later time.    Video visits are billed at different rates depending on your insurance coverage.  Please reach out to your insurance provider with any questions.    If during the course of the call the physician/provider feels a video visit is not appropriate, you will not be charged for this service.\"    Patient has given verbal consent for Video visit? Yes  How would you like to obtain your AVS? Reviewed verbally  Patient would like the video invitation sent by: Text to cell phone: [phone]  Will anyone else be joining your video visit? No      Name: Rojelio Mosqueda is a 69 year old man, seen at the request of Dr. Gill Wei for evaluation of     Chief Complaint   Patient presents with     Thyroid Problem       HPI:  Recent issues:  Here for evaluation of adrenal nodule  Reviewed medical history from patient and Epic chart record        3/2020. Symptoms with \"stomachaches\", substernal pain, cough  Medical evaluation, change from omeprazole to Pepcid  3/31/20 CT chest w/ contrast ([facility]-CR):   Lungs unremarkable   2 cm lipid rich right " adrenal adenoma    Previous history:   Adrenal disease: None   Steroid med use: None   Hypertension:  None   Chronic A/C use: None  Recent [facility] labs include:  Lab Results   Component Value Date    TSH 3.27 10/03/2019    T4 0.9 10/03/2019      Recent symptoms:   Occasional heartburn- less with Pepcid   Sweats often   Some lower back pains  Other chronic illnesses include:   Neuropathy:   Followed by PCP   Depression:    Followed by PCP   Hypothyroidism: Takes levothyroxine med, followed by PCP      Lives in [place], [place]  Sees [facility] for general medicine evaluations.    PMH/PSH:  Past Medical History:   Diagnosis Date     Arthritis      Depressive disorder, not elsewhere classified 7/21/2002     Esophageal reflux      Family history of diabetes mellitus      GERD (gastroesophageal reflux disease)      Idiopathic neuropathy      Peripheral neuropathy      Thyroid disease      Past Surgical History:   Procedure Laterality Date     ARTHROPLASTY KNEE Right 10/8/2018    Procedure: ARTHROPLASTY KNEE;  1.  Right total knee arthroplasty.   2.  Intra-articular corticosteroid injection, left knee. ;  Surgeon: Javi Torres MD;  Location: [facility] OR     ARTHROPLASTY KNEE Left 8/26/2019    Procedure: Left total knee arthroplasty;  Surgeon: Javi Torres MD;  Location: [facility] OR     C NONSPECIFIC PROCEDURE  2/2011    upper endos/ mod hiatal hernia/ antireflux tx     [facility]OSCOPY  2007    [facility]     HERNIA REPAIR      under the age of [age]     INJECT STEROID (LOCATION) Left 10/8/2018    Procedure: INJECT STEROID (LOCATION);;  Surgeon: Javi Torres MD;  Location: [facility] OR     lt. foot-surgery         Family Hx:  Family History   Problem Relation Age of Onset     Diabetes Mother      Cancer No family hx of      Heart Disease No family hx of          Social Hx:  Social History     Socioeconomic History     Marital status:      Spouse name: Roslyn     Number of children: 0     Years of  education: 15     Highest education level: Not on file   Occupational History     Occupation: Industrial      Employer: [employer]   Social Needs     Financial resource strain: Not on file     Food insecurity     Worry: Not on file     Inability: Not on file     Transportation needs     Medical: Not on file     Non-medical: Not on file   Tobacco Use     Smoking status: Former Smoker     Packs/day: 1.00     Years: 15.00     Pack years: 15.00     Types: Cigarettes     Last attempt to quit: 2004     Years since quittin.5     Smokeless tobacco: Never Used     Tobacco comment: Smoked off & on   Substance and Sexual Activity     Alcohol use: Yes     Alcohol/week: 2.5 standard drinks     Types: 3 Standard drinks or equivalent per week     Comment: 2-3 days per week 1-2 drinks per time     Drug use: No     Sexual activity: Yes     Partners: Female   Lifestyle     Physical activity     Days per week: Not on file     Minutes per session: Not on file     Stress: Not on file   Relationships     Social connections     Talks on phone: Not on file     Gets together: Not on file     Attends Yazidi service: Not on file     Active member of club or organization: Not on file     Attends meetings of clubs or organizations: Not on file     Relationship status: Not on file     Intimate partner violence     Fear of current or ex partner: Not on file     Emotionally abused: Not on file     Physically abused: Not on file     Forced sexual activity: Not on file   Other Topics Concern      Service Not Asked     Blood Transfusions Not Asked     Caffeine Concern Not Asked     Occupational Exposure Not Asked     Hobby Hazards Not Asked     Sleep Concern Not Asked     Stress Concern Not Asked     Weight Concern Not Asked     Special Diet Not Asked     Back Care Not Asked     Exercise Yes     Bike Helmet Not Asked     Seat Belt Yes     Self-Exams No     Parent/sibling w/ CABG, MI or angioplasty before 65F 55M?  Yes     Comment: father had MI   Social History Narrative     Not on file          MEDICATIONS:  has a current medication list which includes the following prescription(s): lactobacillus, amitriptyline, carboxymethylcellulose pf, vitamin d-3, levothyroxine, mometasone, multivitamin w/minerals, NONFORMULARY, pregabalin, tadalafil, allopurinol, NONFORMULARY, omega-3 fatty acids, omeprazole, and tadalafil.    ROS:     ROS: 10 point ROS neg other than the symptoms noted above in the HPI.    GENERAL: mild fatigue, wt stable; denies fevers, chills, night sweats.    HEENT: no dysphagia, odonophagia, diplopia, neck pain  THYROID:  no apparent hyper or hypothyroid symptoms  CV: no chest pain, pressure, palpitations  LUNGS: no SOB, RONQUILLO, cough, wheezing   ABDOMEN: no diarrhea, constipation, abdominal pain  EXTREMITIES: no rashes, ulcers, edema  NEUROLOGY: no headaches, denies changes in vision, tingling, extremitiy numbness   MSK: low back pain; denies muscle weakness  SKIN: increased sweating; no rashes or lesions  : decreased erection function, occasional nocturia  PSYCH:  stable mood, no significant anxiety or depression  ENDOCRINE: no heat or cold intolerance    Physical Exam (visual exam)  VS:  no vital signs taken for video visit  GENERAL: healthy, alert and NAD, well dressed, answering questions appropriately  EYES: eyes grossly normal to inspection, conjunctivae and sclerae normal, no exophthalmos or proptosis  THYROID:  no apparent nodules or goiter  LUNGS: no audible wheeze, cough or visible cyanosis, no visible retractions or increased work of breathing  ABDOMEN: abdomen not evaluated  EXTREMITIES: no hand tremors, limited exam  NEUROLOGY: CN grossly intact, mentation intact and speech normal   PSYCH: mentation appears normal, affect normal/bright, judgement and insight intact, normal speech and appearance well groomed      LABS:    All pertinent notes, labs, and images personally reviewed by me.      A/P:  Encounter Diagnoses   Name Primary?     Adrenal nodule (H) Yes     Generalized hyperhidrosis        Comments:   Reviewed health history and adrenal issues.  Suspect benign non-functioning adrenal adenoma    Plan:  Discussed general issues with adrenal gland diseases and management  Reviewed adrenal gland anatomy and hormone physiology  Discussed lab tests used to assess patient adrenal hormone levels  Reviewed adrenal gland CT scan imaging    Recommend:  Need additional lab and imaging information with the adrenal nodule  Plan lab tests for testosterone, cortisol, BMP, 24 hr urine cortisol, total metanephrines  Needs CT scan of adrenal glands with/without contrast   Call [phone] to schedule the procedure at [facility] radiology department  Monitor for symptom changes  If lab and repeat adrenal CT scan results unremarkable, plan repeat non-contrast CT and 24-hr urine cortisol in 1 yr for comparison  Will summarize results and conclusions when these tests completed.     Addressed patient questions today    Future labs ordered today:   Orders Placed This Encounter   Procedures     CT Abdomen w/o & w Contrast     Basic metabolic panel     Testosterone total     Cortisol free urine     Metanephrine random or 24 hr urine     Adrenal corticotropin     Aldosterone     Renin activity     Aldosterone Renin Ratio     Cortisol     Radiology/Consults ordered today: CT ABDOMEN W/O & W CONTRAST    More than 50% of the time spent with Mr. Mosqueda on counseling / coordinating his care.  Total appointment time was 40  minutes.    Follow-up:  BELÉN See MD, MS  Endocrinology  [facility]    CC: LANETTE Wei and GINETTE Strickland      Video-Visit Details    Type of service:  Video Visit    Video Start Time: 1:55 pm  Video End Time: 2:35 pm    Originating Location (pt. Location): [place]    Distant Location (provider location):  [facility]/[place]     Platform used for Video Visit:  Doximity              Again, thank you for allowing me to participate in the care of your patient.        Sincerely,        Maldonado See MD

## 2020-07-09 ENCOUNTER — TRANSFERRED RECORDS (OUTPATIENT)
Dept: FAMILY MEDICINE | Facility: CLINIC | Age: 70
End: 2020-07-09

## 2020-08-11 ENCOUNTER — HOSPITAL ENCOUNTER (OUTPATIENT)
Dept: CT IMAGING | Facility: CLINIC | Age: 70
Discharge: HOME | End: 2020-08-11
Attending: INTERNAL MEDICINE | Admitting: INTERNAL MEDICINE
Payer: MEDICARE

## 2020-08-11 DIAGNOSIS — E27.9 ADRENAL NODULE (H): ICD-10-CM

## 2020-08-11 PROCEDURE — 74150 CT ABDOMEN W/O CONTRAST: CPT

## 2020-08-13 DIAGNOSIS — E27.9 ADRENAL NODULE (H): ICD-10-CM

## 2020-08-13 DIAGNOSIS — R61 GENERALIZED HYPERHIDROSIS: ICD-10-CM

## 2020-08-13 LAB — CORTIS SERPL-MCNC: 6.6 UG/DL (ref 4–22)

## 2020-08-13 PROCEDURE — 82024 ASSAY OF ACTH: CPT | Performed by: INTERNAL MEDICINE

## 2020-08-13 PROCEDURE — 82088 ASSAY OF ALDOSTERONE: CPT | Performed by: INTERNAL MEDICINE

## 2020-08-13 PROCEDURE — 36415 COLL VENOUS BLD VENIPUNCTURE: CPT | Performed by: INTERNAL MEDICINE

## 2020-08-13 PROCEDURE — 82533 TOTAL CORTISOL: CPT | Performed by: INTERNAL MEDICINE

## 2020-08-13 PROCEDURE — 84403 ASSAY OF TOTAL TESTOSTERONE: CPT | Performed by: INTERNAL MEDICINE

## 2020-08-13 PROCEDURE — 80048 BASIC METABOLIC PNL TOTAL CA: CPT | Performed by: INTERNAL MEDICINE

## 2020-08-13 PROCEDURE — 99000 SPECIMEN HANDLING OFFICE-LAB: CPT | Performed by: INTERNAL MEDICINE

## 2020-08-13 PROCEDURE — 84244 ASSAY OF RENIN: CPT | Mod: 90 | Performed by: INTERNAL MEDICINE

## 2020-08-13 PROCEDURE — 40001081 ZZHCL STATISICAL ALDOSTERONE/RENIN RATIO: Performed by: INTERNAL MEDICINE

## 2020-08-14 LAB
ALDOST SERPL-MCNC: 10.9 NG/DL (ref 0–31)
ANION GAP SERPL CALCULATED.3IONS-SCNC: 5 MMOL/L (ref 3–14)
BUN SERPL-MCNC: 20 MG/DL (ref 7–30)
CALCIUM SERPL-MCNC: 9.5 MG/DL (ref 8.5–10.1)
CHLORIDE SERPL-SCNC: 108 MMOL/L (ref 94–109)
CO2 SERPL-SCNC: 25 MMOL/L (ref 20–32)
CREAT SERPL-MCNC: 0.87 MG/DL (ref 0.66–1.25)
GFR SERPL CREATININE-BSD FRML MDRD: 89 ML/MIN/{1.73_M2}
GLUCOSE SERPL-MCNC: 91 MG/DL (ref 70–99)
POTASSIUM SERPL-SCNC: 4.5 MMOL/L (ref 3.4–5.3)
SODIUM SERPL-SCNC: 138 MMOL/L (ref 133–144)
TESTOST SERPL-MCNC: 335 NG/DL (ref 240–950)

## 2020-08-16 PROBLEM — M06.4 INFLAMMATORY POLYARTHROPATHY (H): Status: ACTIVE | Noted: 2020-08-16

## 2020-08-16 PROBLEM — K50.90 CROHN'S DISEASE (H): Status: ACTIVE | Noted: 2020-08-16

## 2020-08-16 LAB — RENIN PLAS-CCNC: 0.7 NG/ML/HR

## 2020-08-16 NOTE — PROGRESS NOTES
Subjective       Rojelio Mosqueda is a 69 year old male who presents to clinic today for recheck on chronic medical conditions:     HPI       1. Off allopurinol for a year  Many years ago Found crystals on surgery  Then saw Rheumatologist.   Was started on allopurinol due to level of uric acid level  Has never had attack.     2 Hypothyroid- aquired  Stable dose  Does note some Tired and fatigue    3. Lyrica - neuropathy in his feet  Neurologist     4. Aortic screening via CT this March    5. Right medial clavicle enlarging  No pain    6. ED  Using 1-2 Cialis prn  No AE  No change in urination or nocturia.    Would like refills for GERD and Allergies.     Hx of Crohn's in chart - pt unaware of this dg.     Patient Active Problem List   Diagnosis     ESOPHAGEAL REFLUX/ hiatal hernia     Family history of diabetes mellitus     Impotence of organic origin     Peripheral neuropathy     Elevated blood uric acid level     Acquired hypothyroidism     Health Care Home     Arthritis of knee     S/P total knee arthroplasty     Sinus bradycardia     Inflammatory polyarthropathy (H)- Rheumatology managed     Past Surgical History:   Procedure Laterality Date     ARTHROPLASTY KNEE Right 10/8/2018    Procedure: ARTHROPLASTY KNEE;  1.  Right total knee arthroplasty.   2.  Intra-articular corticosteroid injection, left knee. ;  Surgeon: Javi Torres MD;  Location: [facility] OR     ARTHROPLASTY KNEE Left 8/26/2019    Procedure: Left total knee arthroplasty;  Surgeon: Javi Torres MD;  Location:  OR      NONSPECIFIC PROCEDURE  2/2011    upper endos/ mod hiatal hernia/ antireflux tx     COLONOSCOPY  2007    [place] office     HERNIA REPAIR      under the age of [age]     INJECT STEROID (LOCATION) Left 10/8/2018    Procedure: INJECT STEROID (LOCATION);;  Surgeon: Javi Torres MD;  Location:  OR     . foot-surgery         Social History     Tobacco Use     Smoking status: Former Smoker     Packs/day: 1.00      Years: 15.00     Pack years: 15.00     Types: Cigarettes     Last attempt to quit: 2004     Years since quittin.6     Smokeless tobacco: Never Used     Tobacco comment: Smoked off & on   Substance Use Topics     Alcohol use: Yes     Alcohol/week: 2.5 standard drinks     Types: 3 Standard drinks or equivalent per week     Comment: 2-3 days per week 1-2 drinks per time     Family History   Problem Relation Age of Onset     Diabetes Mother      Cancer No family hx of      Heart Disease No family hx of            Current Outpatient Medications   Medication Sig Dispense Refill     ACIDOPHILUS LACTOBACILLUS PO Take 700 mg by mouth daily        allopurinol (ZYLOPRIM) 100 MG tablet Take 1 tablet (100 mg) by mouth daily 90 tablet 3     carboxymethylcellulose PF (REFRESH PLUS) 0.5 % ophthalmic solution Place 2 drops into both eyes 3 times daily as needed for dry eyes       cholecalciferol 25 MCG (1000 UT) TABS Take 2,000 Units by mouth       Cyanocobalamin (B-12) 1000 MCG TBCR        famotidine (PEPCID) 10 MG tablet Take 1 tablet (10 mg) by mouth 2 times daily 180 tablet 3     levothyroxine (SYNTHROID/LEVOTHROID) 112 MCG tablet Take 1 tablet (112 mcg) by mouth daily 90 tablet 0     mometasone (NASONEX) 50 MCG/ACT nasal spray Spray 2 sprays into both nostrils daily 1 Box 1     multivitamin w/minerals (MULTI-VITAMIN) tablet Take 1 tablet by mouth daily       multivitamin w/minerals (MULTI-VITAMIN) tablet Take 1 tablet by mouth daily Pt uses [employer] brand       NONFORMULARY Yang-remedy B12 one daily       NONFORMULARY A-reds2 - 2 capsules daily       Omega-3 Fatty Acids (OMEGA-3 PO) Take 1,000 mg by mouth daily        pregabalin (LYRICA) 150 MG capsule Take 2 capsules (300 mg) by mouth 2 times daily 360 capsule 3     tadalafil (CIALIS) 5 MG tablet Take 1 - 2 tablets 30 min prior to intercourse 30 tablet 2     amitriptyline (ELAVIL) 25 MG tablet TK 1 T PO Q QHS       Allergies   Allergen Reactions     Cat Hair Extract  "Shortness Of Breath     Dust Mites Itching     No Known Drug Allergies      Recent Labs   Lab Test 08/17/20  1611 08/13/20  1016 10/03/19  0950 10/03/19 08/15/19  10/01/18  1623 06/22/18  0805  06/22/17  0921 09/12/16  0837   LDL  --   --   --  113  --   --   --  125*  --  134*  --    HDL  --   --   --  36*  --   --   --  31*  --  34*  --    TRIG  --   --   --  236*  --   --   --  187*  --  211*  --    ALT  --   --   --   --   --   --   --   --   --   --  28   CR  --  0.87  --   --  0.96  --  0.96 0.94  --  0.98  --    GFRESTIMATED  --  89  --   --   --   --  83 85  --  81  --    GFRESTBLACK  --  >90  --   --   --   --   --   --   --   --   --    POTASSIUM  --  4.5  --   --  4.63  --  4.2 4.2  --  4.9  --    TSH 2.74  --  3.27  --   --    < >  --   --    < > 4.14  --     < > = values in this interval not displayed.        BP Readings from Last 3 Encounters:   08/17/20 102/68   03/25/20 120/82   10/03/19 118/80    Wt Readings from Last 3 Encounters:   08/17/20 108.9 kg (240 lb)   03/25/20 114.8 kg (253 lb)   10/03/19 118.4 kg (261 lb)                             Review of Systems     CONSTITUTIONAL:NEGATIVE for fever, chills, change in weight  EYES: NEGATIVE for vision changes or irritation  RESP:NEGATIVE for significant cough or SOB  CV: NEGATIVE for chest pain, palpitations or peripheral edema  GI: NEGATIVE for nausea, abdominal pain,or change in bowel habits  NEURO: NEGATIVE for weakness, dizziness  ENDOCRINE: NEGATIVE for temperature intolerance, skin/hair changes  PSYCHIATRIC: NEGATIVE for changes in mood or affect          Objective    /68 (BP Location: Left arm, Patient Position: Sitting, Cuff Size: Adult Large)   Pulse 50   Temp 97.2  F (36.2  C) (Oral)   Ht 1.885 m (6' 2.2\")   Wt 108.9 kg (240 lb)   SpO2 97%   BMI 30.65 kg/m    Body mass index is 30.65 kg/m .    Physical Exam     GENERAL: healthy, alert and no distress  Head: Normocephalic, atraumatic.  Eyes: Conjunctiva clear, no " discharge  Ears: External ears and TMs normal BL.  Nose: Nasal mucosa pink and moist. No discharge.  Mouth / Throat: Mucous membranes moist. Normal dentition.  Pharynx non-erythematous, no exudates.   Neck: Supple, No thyromegaly or nodules. No lymphadenopathy.  RESP: lungs clear to auscultation - no rales, rhonchi or wheezes  CV: regular rate and rhythm, normal S1 S2, no S3 or S4, no murmur, click or rub, no peripheral edema and peripheral pulses strong  Musc: Slight enlargement of medial right clavicle - no crepitus or tenderness    Diagnostic Test Results:  Labs reviewed in Epic            Assessment & Plan     1. Elevated blood uric acid level  Uric acid level slightly high. Discussed staying off allopurinol - see if any attacks occur  - URIC ACID ([facility])    2. Acquired hypothyroidism  Slightly hypo - inc dose to 112- lab only in 6 weeks  - VENOUS COLLECTION  - TSH ([facility])  - T4 free ([facility])  - levothyroxine (SYNTHROID/LEVOTHROID) 112 MCG tablet; Take 1 tablet (112 mcg) by mouth daily  Dispense: 90 tablet; Refill: 0  - TSH; Standing  - T4 free; Standing  - VENOUS COLLECTION; Standing    3. Neuropathy    - pregabalin (LYRICA) 150 MG capsule; Take 2 capsules (300 mg) by mouth 2 times daily  Dispense: 360 capsule; Refill: 3    4. Impotence of organic origin    - tadalafil (CIALIS) 5 MG tablet; Take 1 - 2 tablets 30 min prior to intercourse  Dispense: 30 tablet; Refill: 2    5. Gastroesophageal reflux disease without esophagitis    - famotidine (PEPCID) 10 MG tablet; Take 1 tablet (10 mg) by mouth 2 times daily  Dispense: 180 tablet; Refill: 3    6. Nasal congestion    - mometasone (NASONEX) 50 MCG/ACT nasal spray; Spray 2 sprays into both nostrils daily  Dispense: 1 Box; Refill: 1    7. Clavicle enlargement  Advised to RTC if this becomes painful or enlarging - will need xray    8. Screening for AAA (abdominal aortic aneurysm)    - Abdominal Aortic Aneurysm Screening/Tracking  CT done this sprint - NL  9. Obesity  "(BMI 30.0-34.9)         BMI:   Estimated body mass index is 32.48 kg/m  as calculated from the following:    Height as of 3/25/20: 1.88 m (6' 2\").    Weight as of 3/25/20: 114.8 kg (253 lb).   Weight management plan: Discussed healthy diet and exercise guidelines          LEONCIO Romano  [facility]        Nursing Notes:   Blanca Crabtree  8/18/2020  4:29 PM  Addendum  Miguel is here for a med check.        Pre-visit Screening:  Immunizations:  Not up to date- informed pt to go to pharmacy  Colonoscopy:  is up to date  Mammogram: NA  Asthma Action Test/Plan:  NA  PHQ9:  Done today  GAD7:  Done today  Questioned patient about current smoking habits Pt. Quit some time ago  Ok to leave detailed message on voice mail for today's visit only Yes, phone # [phone] (Preferred)           "

## 2020-08-17 ENCOUNTER — OFFICE VISIT (OUTPATIENT)
Dept: FAMILY MEDICINE | Facility: CLINIC | Age: 70
End: 2020-08-17

## 2020-08-17 VITALS
BODY MASS INDEX: 30.8 KG/M2 | WEIGHT: 240 LBS | DIASTOLIC BLOOD PRESSURE: 68 MMHG | OXYGEN SATURATION: 97 % | TEMPERATURE: 97.2 F | SYSTOLIC BLOOD PRESSURE: 102 MMHG | HEART RATE: 50 BPM | HEIGHT: 74 IN

## 2020-08-17 DIAGNOSIS — E66.811 OBESITY (BMI 30.0-34.9): ICD-10-CM

## 2020-08-17 DIAGNOSIS — M89.319 CLAVICLE ENLARGEMENT: ICD-10-CM

## 2020-08-17 DIAGNOSIS — G62.9 NEUROPATHY: ICD-10-CM

## 2020-08-17 DIAGNOSIS — E79.0 ELEVATED BLOOD URIC ACID LEVEL: Primary | ICD-10-CM

## 2020-08-17 DIAGNOSIS — N52.9 IMPOTENCE OF ORGANIC ORIGIN: ICD-10-CM

## 2020-08-17 DIAGNOSIS — R09.81 NASAL CONGESTION: ICD-10-CM

## 2020-08-17 DIAGNOSIS — Z13.6 SCREENING FOR AAA (ABDOMINAL AORTIC ANEURYSM): ICD-10-CM

## 2020-08-17 DIAGNOSIS — K21.9 GASTROESOPHAGEAL REFLUX DISEASE WITHOUT ESOPHAGITIS: ICD-10-CM

## 2020-08-17 DIAGNOSIS — E03.9 ACQUIRED HYPOTHYROIDISM: ICD-10-CM

## 2020-08-17 LAB — ALDOSTERONE RENIN RATIO: 15.6 (ref 0–25)

## 2020-08-17 PROCEDURE — 99214 OFFICE O/P EST MOD 30 MIN: CPT | Performed by: PHYSICIAN ASSISTANT

## 2020-08-17 PROCEDURE — 36415 COLL VENOUS BLD VENIPUNCTURE: CPT | Performed by: PHYSICIAN ASSISTANT

## 2020-08-17 RX ORDER — FAMOTIDINE 10 MG
10 TABLET ORAL 2 TIMES DAILY
Qty: 180 TABLET | Refills: 3 | Status: SHIPPED | OUTPATIENT
Start: 2020-08-17 | End: 2021-08-02

## 2020-08-17 RX ORDER — FAMOTIDINE 10 MG
10 TABLET ORAL 2 TIMES DAILY
COMMUNITY
End: 2020-08-17

## 2020-08-17 RX ORDER — MULTIPLE VITAMINS W/ MINERALS TAB 9MG-400MCG
1 TAB ORAL DAILY
COMMUNITY
End: 2021-08-02

## 2020-08-17 RX ORDER — MOMETASONE FUROATE MONOHYDRATE 50 UG/1
2 SPRAY, METERED NASAL DAILY
Qty: 1 BOX | Refills: 1 | Status: SHIPPED | OUTPATIENT
Start: 2020-08-17 | End: 2021-08-02

## 2020-08-17 RX ORDER — PREGABALIN 150 MG/1
300 CAPSULE ORAL 2 TIMES DAILY
Qty: 360 CAPSULE | Refills: 3 | Status: SHIPPED | OUTPATIENT
Start: 2020-08-17 | End: 2021-04-27

## 2020-08-17 RX ORDER — LEVOTHYROXINE SODIUM 100 UG/1
TABLET ORAL
Qty: 90 TABLET | Refills: 3 | Status: CANCELLED | OUTPATIENT
Start: 2020-08-17

## 2020-08-17 RX ORDER — ALLOPURINOL 100 MG/1
100 TABLET ORAL DAILY
Qty: 90 TABLET | Refills: 3 | Status: CANCELLED | OUTPATIENT
Start: 2020-08-17

## 2020-08-17 RX ORDER — TADALAFIL 5 MG/1
TABLET ORAL
Qty: 30 TABLET | Refills: 2 | Status: SHIPPED | OUTPATIENT
Start: 2020-08-17 | End: 2021-08-02

## 2020-08-17 ASSESSMENT — MIFFLIN-ST. JEOR: SCORE: 1936.56

## 2020-08-18 PROBLEM — E66.811 OBESITY (BMI 30.0-34.9): Status: ACTIVE | Noted: 2020-08-18

## 2020-08-18 PROBLEM — M1A.9XX0 CHRONIC GOUT WITHOUT TOPHUS, UNSPECIFIED CAUSE, UNSPECIFIED SITE: Status: RESOLVED | Noted: 2018-11-18 | Resolved: 2020-08-18

## 2020-08-18 LAB
ACTH PLAS-MCNC: 18 PG/ML
T4, FREE, NON-DIALYSIS - QUEST: 1.2 NG/DL (ref 0.8–1.8)
TSH SERPL-ACNC: 2.74 MIU/L (ref 0.4–4.5)
URATE SERPL-MCNC: 7.9 MG/DL (ref 4–8)

## 2020-08-18 RX ORDER — LEVOTHYROXINE SODIUM 112 UG/1
112 TABLET ORAL DAILY
Qty: 90 TABLET | Refills: 0 | Status: SHIPPED | OUTPATIENT
Start: 2020-08-18 | End: 2020-10-23

## 2020-08-18 NOTE — NURSING NOTE
Miguel is here for a med check.        Pre-visit Screening:  Immunizations:  Not up to date- informed pt to go to pharmacy  Colonoscopy:  is up to date  Mammogram: NA  Asthma Action Test/Plan:  NA  PHQ9:  Done today  GAD7:  Done today  Questioned patient about current smoking habits Pt. Quit some time ago  Ok to leave detailed message on voice mail for today's visit only Yes, phone # [phone] (Preferred)

## 2020-08-19 DIAGNOSIS — E27.9 ADRENAL NODULE (H): ICD-10-CM

## 2020-08-19 PROCEDURE — 82530 CORTISOL FREE: CPT | Mod: 90 | Performed by: INTERNAL MEDICINE

## 2020-08-19 PROCEDURE — 83835 ASSAY OF METANEPHRINES: CPT | Mod: 90 | Performed by: INTERNAL MEDICINE

## 2020-08-19 PROCEDURE — 99000 SPECIMEN HANDLING OFFICE-LAB: CPT | Performed by: INTERNAL MEDICINE

## 2020-08-22 LAB
COLLECT DURATION TIME SPEC: 24 H
COLLECT DURATION TIME SPEC: 24 H
CORTIS F 24H UR HPLC-MCNC: 22.4 UG/L
CORTIS F 24H UR-MRATE: 37 UG/D
CORTIS F/CREAT 24H UR: 15.56 UG/G CRT
CREAT 24H UR-MRATE: 2360 MG/D (ref 800–2100)
CREAT 24H UR-MRATE: 2376 MG/D (ref 800–2100)
CREAT UR-MCNC: 143 MG/DL
CREAT UR-MCNC: 144 MG/DL
IMP & REVIEW OF LAB RESULTS: ABNORMAL
METANEPH 24H UR-MCNC: 63 UG/L
METANEPH 24H UR-MRATE: 104 UG/D (ref 55–320)
METANEPH+NORMETANEPH UR-IMP: ABNORMAL
METANEPH/CREAT 24H UR: 44 UG/G CRT (ref 0–300)
NORMETANEPHRINE 24H UR-MCNC: 319 UG/L
NORMETANEPHRINE 24H UR-MRATE: 526 UG/D (ref 114–865)
NORMETANEPHRINE/CREAT 24H UR: 223 UG/G CRT (ref 0–400)
SPECIMEN VOL ?TM UR: 1650 ML
SPECIMEN VOL ?TM UR: 1650 ML

## 2020-08-24 ENCOUNTER — MYC MEDICAL ADVICE (OUTPATIENT)
Dept: ENDOCRINOLOGY | Facility: CLINIC | Age: 70
End: 2020-08-24

## 2020-10-12 ENCOUNTER — MYC MEDICAL ADVICE (OUTPATIENT)
Dept: FAMILY MEDICINE | Facility: CLINIC | Age: 70
End: 2020-10-12

## 2020-10-23 ENCOUNTER — MYC MEDICAL ADVICE (OUTPATIENT)
Dept: FAMILY MEDICINE | Facility: CLINIC | Age: 70
End: 2020-10-23

## 2020-10-23 DIAGNOSIS — E03.9 ACQUIRED HYPOTHYROIDISM: ICD-10-CM

## 2020-10-23 RX ORDER — LEVOTHYROXINE SODIUM 112 UG/1
112 TABLET ORAL DAILY
Qty: 90 TABLET | Refills: 3 | Status: SHIPPED | OUTPATIENT
Start: 2020-10-23 | End: 2020-11-03

## 2020-10-23 NOTE — TELEPHONE ENCOUNTER
Thyroid stimulating hormone (TSH) measurement             10/13/2020              TSH SerPl DL=0.01 mU/L-aCnc         0.79 uIU/mL 0.45 - 4.50  [facility]     Free thyroxine (FT4) measurement                10/13/2020              T4 Free SerPl-[facility]       1.40 ng/dL   0.8 - 1.7  [facility]     Free triiodothyronine (T3) measurement                   10/13/2020              T3Free SerPl-[facility]        2.90 pg/mL  2.0 - 4.8  [facility]

## 2020-10-31 PROBLEM — E27.9 ADRENAL NODULE (H): Status: ACTIVE | Noted: 2020-10-31

## 2020-11-02 DIAGNOSIS — E03.9 ACQUIRED HYPOTHYROIDISM: ICD-10-CM

## 2020-11-03 RX ORDER — LEVOTHYROXINE SODIUM 112 UG/1
TABLET ORAL
Qty: 90 TABLET | Refills: 3 | Status: SHIPPED | OUTPATIENT
Start: 2020-11-03 | End: 2021-08-02

## 2020-11-03 NOTE — TELEPHONE ENCOUNTER
You sent the refill to [facility] instead of [facility] Please Change. Thanks     Pending Prescriptions:                       Disp   Refills    levothyroxine (SYNTHROID/LEVOTHROID) 112 *90 tab*3            Sig: TAKE 1 TABLET(112 MCG) BY MOUTH DAILY

## 2021-01-15 ENCOUNTER — HEALTH MAINTENANCE LETTER (OUTPATIENT)
Age: 71
End: 2021-01-15

## 2021-04-20 ENCOUNTER — MYC MEDICAL ADVICE (OUTPATIENT)
Dept: FAMILY MEDICINE | Facility: CLINIC | Age: 71
End: 2021-04-20

## 2021-04-20 DIAGNOSIS — M54.50 CHRONIC BILATERAL LOW BACK PAIN WITHOUT SCIATICA: Primary | ICD-10-CM

## 2021-04-20 DIAGNOSIS — G89.29 CHRONIC BILATERAL LOW BACK PAIN WITHOUT SCIATICA: Primary | ICD-10-CM

## 2021-04-20 RX ORDER — CYCLOBENZAPRINE HCL 5 MG
5-10 TABLET ORAL 3 TIMES DAILY PRN
Qty: 18 TABLET | Refills: 0 | Status: SHIPPED | OUTPATIENT
Start: 2021-04-20 | End: 2021-08-02

## 2021-04-26 ENCOUNTER — MYC MEDICAL ADVICE (OUTPATIENT)
Dept: FAMILY MEDICINE | Facility: CLINIC | Age: 71
End: 2021-04-26

## 2021-04-26 DIAGNOSIS — G62.9 NEUROPATHY: ICD-10-CM

## 2021-04-26 RX ORDER — PREGABALIN 150 MG/1
CAPSULE ORAL
Qty: 360 CAPSULE | COMMUNITY
Start: 2021-04-26

## 2021-04-26 NOTE — TELEPHONE ENCOUNTER
Last OV 8/17/2020.  Sent year supply at that time.     Refused Prescriptions:                       Disp   Refills    pregabalin (LYRICA) 150 MG capsule [Pharma*360 ca*         Sig: TAKE 2 CAPSULES(300 MG) BY MOUTH TWICE DAILY.  Refused By: ROSARIO JENKINS  Reason for Refusal: Should already have refills on file  Reason for Refusal Comment: sent 360 with 3 refills on 8/17/2020 to [facility] in [place]

## 2021-04-27 RX ORDER — PREGABALIN 150 MG/1
300 CAPSULE ORAL 2 TIMES DAILY
Qty: 360 CAPSULE | Refills: 0 | Status: SHIPPED | OUTPATIENT
Start: 2021-04-27 | End: 2021-08-02

## 2021-04-27 NOTE — TELEPHONE ENCOUNTER
Sending to on call provider. Please see pt mychart message. Also received message from pharmacy stating same information pt gave.     Pending Prescriptions:                       Disp   Refills    pregabalin (LYRICA) 150 MG capsule        360 ca*1            Sig: Take 2 capsules (300 mg) by mouth 2 times daily

## 2021-04-27 NOTE — TELEPHONE ENCOUNTER
Call him. I sent 90 days. This is a controlled substance, should be seen prior to further refills. Also we don't have any notes from neurology. He should get us these.

## 2021-07-14 ENCOUNTER — MYC MEDICAL ADVICE (OUTPATIENT)
Dept: FAMILY MEDICINE | Facility: CLINIC | Age: 71
End: 2021-07-14

## 2021-08-02 ENCOUNTER — OFFICE VISIT (OUTPATIENT)
Dept: FAMILY MEDICINE | Facility: CLINIC | Age: 71
End: 2021-08-02

## 2021-08-02 VITALS
WEIGHT: 246 LBS | HEIGHT: 74 IN | DIASTOLIC BLOOD PRESSURE: 74 MMHG | BODY MASS INDEX: 31.57 KG/M2 | OXYGEN SATURATION: 96 % | RESPIRATION RATE: 20 BRPM | HEART RATE: 56 BPM | SYSTOLIC BLOOD PRESSURE: 112 MMHG | TEMPERATURE: 97.6 F

## 2021-08-02 DIAGNOSIS — E78.2 MIXED HYPERLIPIDEMIA: ICD-10-CM

## 2021-08-02 DIAGNOSIS — R09.81 NASAL CONGESTION: ICD-10-CM

## 2021-08-02 DIAGNOSIS — E66.811 OBESITY (BMI 30.0-34.9): ICD-10-CM

## 2021-08-02 DIAGNOSIS — E79.0 ELEVATED BLOOD URIC ACID LEVEL: ICD-10-CM

## 2021-08-02 DIAGNOSIS — E03.9 ACQUIRED HYPOTHYROIDISM: Primary | ICD-10-CM

## 2021-08-02 DIAGNOSIS — G62.9 NEUROPATHY: ICD-10-CM

## 2021-08-02 DIAGNOSIS — G58.8 OTHER MONONEUROPATHY: ICD-10-CM

## 2021-08-02 DIAGNOSIS — K21.9 GASTROESOPHAGEAL REFLUX DISEASE WITHOUT ESOPHAGITIS: ICD-10-CM

## 2021-08-02 DIAGNOSIS — M89.319 ENLARGED CLAVICLE: ICD-10-CM

## 2021-08-02 DIAGNOSIS — E27.9 ADRENAL NODULE (H): ICD-10-CM

## 2021-08-02 DIAGNOSIS — R73.01 ELEVATED FASTING GLUCOSE: ICD-10-CM

## 2021-08-02 DIAGNOSIS — N52.9 IMPOTENCE OF ORGANIC ORIGIN: ICD-10-CM

## 2021-08-02 PROBLEM — M06.4 INFLAMMATORY POLYARTHROPATHY (H): Status: RESOLVED | Noted: 2020-08-16 | Resolved: 2021-08-02

## 2021-08-02 LAB
ALBUMIN SERPL-MCNC: 4.4 G/DL (ref 3.6–5.1)
ALBUMIN/GLOB SERPL: 1.6 {RATIO} (ref 1–2.5)
ALP SERPL-CCNC: 50 U/L (ref 33–130)
ALT 1742-6: 22 U/L (ref 0–32)
AST 1920-8: 17 U/L (ref 0–35)
BILIRUB SERPL-MCNC: 1.1 MG/DL (ref 0.2–1.2)
BUN SERPL-MCNC: 22 MG/DL (ref 7–25)
BUN/CREATININE RATIO: 18.8 (ref 6–22)
CALCIUM SERPL-MCNC: 9.3 MG/DL (ref 8.6–10.3)
CHLORIDE SERPLBLD-SCNC: 103.4 MMOL/L (ref 98–110)
CHOLEST SERPL-MCNC: 209 MG/DL (ref 0–199)
CHOLEST/HDLC SERPL: 5 {RATIO} (ref 0–5)
CO2 SERPL-SCNC: 30 MMOL/L (ref 20–32)
CREAT SERPL-MCNC: 1.17 MG/DL (ref 0.6–1.3)
GLOBULIN, CALCULATED - QUEST: 2.8 (ref 1.9–3.7)
GLUCOSE SERPL-MCNC: 110 MG/DL (ref 60–99)
HBA1C MFR BLD: 5.3 % (ref 4–7)
HBA1C MFR BLD: 5.4 % (ref 4–7)
HDLC SERPL-MCNC: 44 MG/DL (ref 40–150)
LDLC SERPL CALC-MCNC: 124 MG/DL (ref 0–130)
POTASSIUM SERPL-SCNC: 4.57 MMOL/L (ref 3.5–5.3)
PROT SERPL-MCNC: 7.2 G/DL (ref 6.1–8.1)
SODIUM SERPL-SCNC: 139.4 MMOL/L (ref 135–146)
TRIGL SERPL-MCNC: 207 MG/DL (ref 0–149)

## 2021-08-02 PROCEDURE — 90732 PPSV23 VACC 2 YRS+ SUBQ/IM: CPT | Performed by: PHYSICIAN ASSISTANT

## 2021-08-02 PROCEDURE — 99214 OFFICE O/P EST MOD 30 MIN: CPT | Mod: 25 | Performed by: PHYSICIAN ASSISTANT

## 2021-08-02 PROCEDURE — 83036 HEMOGLOBIN GLYCOSYLATED A1C: CPT | Performed by: PHYSICIAN ASSISTANT

## 2021-08-02 PROCEDURE — G0009 ADMIN PNEUMOCOCCAL VACCINE: HCPCS | Performed by: PHYSICIAN ASSISTANT

## 2021-08-02 PROCEDURE — 73000 X-RAY EXAM OF COLLAR BONE: CPT | Performed by: PHYSICIAN ASSISTANT

## 2021-08-02 PROCEDURE — 80061 LIPID PANEL: CPT | Performed by: PHYSICIAN ASSISTANT

## 2021-08-02 PROCEDURE — 80053 COMPREHEN METABOLIC PANEL: CPT | Performed by: PHYSICIAN ASSISTANT

## 2021-08-02 PROCEDURE — 36415 COLL VENOUS BLD VENIPUNCTURE: CPT | Performed by: PHYSICIAN ASSISTANT

## 2021-08-02 RX ORDER — PREGABALIN 150 MG/1
300 CAPSULE ORAL 2 TIMES DAILY
Qty: 360 CAPSULE | Refills: 3 | Status: SHIPPED | OUTPATIENT
Start: 2021-08-02 | End: 2021-10-29

## 2021-08-02 RX ORDER — DULOXETIN HYDROCHLORIDE 20 MG/1
CAPSULE, DELAYED RELEASE ORAL
Qty: 60 CAPSULE | Refills: 1 | Status: SHIPPED | OUTPATIENT
Start: 2021-08-02 | End: 2022-06-30

## 2021-08-02 RX ORDER — TADALAFIL 5 MG/1
TABLET ORAL
Qty: 30 TABLET | Refills: 2 | Status: SHIPPED | OUTPATIENT
Start: 2021-08-02 | End: 2023-07-17

## 2021-08-02 RX ORDER — LEVOTHYROXINE SODIUM 112 UG/1
TABLET ORAL
Qty: 90 TABLET | Refills: 3 | Status: SHIPPED | OUTPATIENT
Start: 2021-08-02 | End: 2022-06-30

## 2021-08-02 RX ORDER — FAMOTIDINE 10 MG
10 TABLET ORAL 2 TIMES DAILY
Qty: 180 TABLET | Refills: 3 | Status: SHIPPED | OUTPATIENT
Start: 2021-08-02 | End: 2023-07-17

## 2021-08-02 RX ORDER — MOMETASONE FUROATE MONOHYDRATE 50 UG/1
2 SPRAY, METERED NASAL DAILY
Qty: 17 G | Refills: 4 | Status: SHIPPED | OUTPATIENT
Start: 2021-08-02 | End: 2022-06-30

## 2021-08-02 ASSESSMENT — MIFFLIN-ST. JEOR: SCORE: 1958.78

## 2021-08-02 NOTE — PROGRESS NOTES
Assessment & Plan     Acquired hypothyroidism    - VENOUS COLLECTION  - levothyroxine (SYNTHROID/LEVOTHROID) 112 MCG tablet; TAKE 1 TABLET(112 MCG) BY MOUTH DAILY  - TSH ([facility])  - T4 FREE ([facility])    Elevated fasting glucose    - Comprehensive Metobolic Panel ([facility])  - HEMOGLOBIN A1C ([facility])    Mixed hyperlipidemia    - Comprehensive Metobolic Panel ([facility])  - Lipid Panel ([facility])    Adrenal nodule (H) FU with Endo 8/2021  Pt to Follow-up with Dr. See    Elevated blood uric acid level    - URIC ACID ([facility])  - VENOUS COLLECTION    Impotence of organic origin    - PSA Total ([facility])  - tadalafil (CIALIS) 5 MG tablet; Take 1 - 2 tablets 30 min prior to intercourse    Obesity (BMI 30.0-34.9)      Other mononeuropathy  Pt will look into [facility] referral  Advised most likely due to elevated fasting glucose      Gastroesophageal reflux disease without esophagitis    - famotidine (PEPCID) 10 MG tablet; Take 1 tablet (10 mg) by mouth 2 times daily    Nasal congestion    - mometasone (NASONEX) 50 MCG/ACT nasal spray; Spray 2 sprays into both nostrils daily    Neuropathy   Trial of Cymbalta  I reviewed the patient information handout from Up To Date on this medication including side effects with the patient.  Send me update 2-3 weeks  - DULoxetine (CYMBALTA) 20 MG capsule; Take one by mouth for 2 weeks then 2 by mouth daily  - pregabalin (LYRICA) 150 MG capsule; Take 2 capsules (300 mg) by mouth 2 times daily    Enlarged clavicle  Xray normal  - X-ray rt Clavicle      Follow-up 12 months fasting CPE    LEONCIO Romano  [facility]    Subjective     Nursing Notes:   Marissa Lorenzana CMA  8/2/2021  9:19 AM  Signed  Chief Complaint   Patient presents with     Recheck Medication     fasting medication check and review     Pre-visit Screening:  Immunizations:  not up to date - due for pneumo 23   Colonoscopy:  is up to date  Mammogram: NA  Asthma Action Test/Plan:  NA  PHQ9: PHQ-2 done today   GAD7:  No  concerns  Questioned patient about current smoking habits Pt. quit smoking some time ago.  Ok to leave detailed message on voice mail for today's visit only Yes, phone # [phone]           Rojelio Mosqueda is a 70 year old male who presents to clinic today for the following health issues     HPI     Fasting  Elevated fasting glucose     Condo with pool inside.   Good physical activity  Biking, hiking, walking, Pickleball     Adrenal tumor  - Dr. See workup  Per note wants him to Follow-up 7/21      Neuropathy: Lyrica currently  would like to retry Cymbalta  Had some stomach upset with it - was only on it for a week.   Wondering if he should see someone at [place]    ED -  Cialis prn      Gout  - one gout attack years ago.     GERD - 10 mg bid Pepcid      Hypothyroidism Follow-up      Since last visit, patient describes the following symptoms: Weight stable, no hair loss, no skin changes, no constipation, no loose stools    2 years right clavicle fullness medially. No pain  No trauma      Wt Readings from Last 5 Encounters:   08/02/21 111.6 kg (246 lb)   08/17/20 108.9 kg (240 lb)   03/25/20 114.8 kg (253 lb)   10/03/19 118.4 kg (261 lb)   08/26/19 119.3 kg (263 lb)         Current Medications  Current Outpatient Medications   Medication Sig Dispense Refill     ACIDOPHILUS LACTOBACILLUS PO Take 700 mg by mouth daily        cholecalciferol 25 MCG (1000 UT) TABS Take 2,000 Units by mouth       Cyanocobalamin (B-12) 1000 MCG TBCR        DULoxetine (CYMBALTA) 20 MG capsule Take one by mouth for 2 weeks then 2 by mouth daily 60 capsule 1     famotidine (PEPCID) 10 MG tablet Take 1 tablet (10 mg) by mouth 2 times daily 180 tablet 3     levothyroxine (SYNTHROID/LEVOTHROID) 112 MCG tablet TAKE 1 TABLET(112 MCG) BY MOUTH DAILY 90 tablet 3     mometasone (NASONEX) 50 MCG/ACT nasal spray Spray 2 sprays into both nostrils daily 17 g 4     multivitamin w/minerals (MULTI-VITAMIN) tablet Take 1 tablet by mouth daily Pt uses  "[employer] brand       NONFORMULARY Yang-remedy B12 one daily       NONFORMULARY A-reds2 - 2 capsules daily       Omega-3 Fatty Acids (OMEGA-3 PO) Take 1,000 mg by mouth daily        pregabalin (LYRICA) 150 MG capsule Take 2 capsules (300 mg) by mouth 2 times daily 360 capsule 3     tadalafil (CIALIS) 5 MG tablet Take 1 - 2 tablets 30 min prior to intercourse 30 tablet 2     carboxymethylcellulose PF (REFRESH PLUS) 0.5 % ophthalmic solution Place 2 drops into both eyes 3 times daily as needed for dry eyes (Patient not taking: Reported on 8/2/2021)           Constitutional, HEENT, Cardiovascular, Pulmonary, GI and  systems are negative, except as otherwise noted.          Objective    /74 (BP Location: Right arm, Patient Position: Sitting, Cuff Size: Adult Large)   Pulse 56   Temp 97.6  F (36.4  C) (Temporal)   Resp 20   Ht 1.885 m (6' 2.2\")   Wt 111.6 kg (246 lb)   SpO2 96%   BMI 31.41 kg/m    Body mass index is 31.41 kg/m .  Physical Exam     GENERAL: healthy, alert and no distress  EYES: Eyes grossly normal to inspection, PERRL and conjunctivae and sclerae normal  HENT: ear canals and TM's normal, nose and mouth without ulcers or lesions  NECK: no adenopathy, no asymmetry, masses, or scars and thyroid normal to palpation  RESP: lungs clear to auscultation - no rales, rhonchi or wheezes  CV: regular rate and rhythm, normal S1 S2, no S3 or S4, no murmur, click or rub, no peripheral edema and peripheral pulses strong  MS: no gross musculoskeletal defects noted, no edema    Diabetic foot exam: normal DP and PT pulses, no trophic changes or ulcerative lesions and   Mild dec. Sensation bilaterally            "

## 2021-08-02 NOTE — NURSING NOTE
Chief Complaint   Patient presents with     Recheck Medication     fasting medication check and review     Pre-visit Screening:  Immunizations:  not up to date - due for pneumo 23   Colonoscopy:  is up to date  Mammogram: NA  Asthma Action Test/Plan:  NA  PHQ9: PHQ-2 done today   GAD7:  No concerns  Questioned patient about current smoking habits Pt. quit smoking some time ago.  Ok to leave detailed message on voice mail for today's visit only Yes, phone # [phone]

## 2021-08-03 ENCOUNTER — MYC MEDICAL ADVICE (OUTPATIENT)
Dept: FAMILY MEDICINE | Facility: CLINIC | Age: 71
End: 2021-08-03

## 2021-08-03 ENCOUNTER — TELEPHONE (OUTPATIENT)
Dept: FAMILY MEDICINE | Facility: CLINIC | Age: 71
End: 2021-08-03

## 2021-08-03 LAB
ABBOTT PSA - QUEST: 1 NG/ML
T4, FREE, NON-DIALYSIS - QUEST: 1.3 NG/DL (ref 0.8–1.8)
TSH SERPL-ACNC: 4.75 MIU/L (ref 0.4–4.5)
URATE SERPL-MCNC: 7.8 MG/DL (ref 4–8)

## 2021-08-03 NOTE — TELEPHONE ENCOUNTER
Prior Auth was denied. Medication used for sexual dysfunction are excluded from coverage under Medicare.   Will inform pt and give him [employer] info.

## 2021-08-06 NOTE — PROGRESS NOTES
[facility]  [address]  [address]  [facility] [address]  [phone]  Dept: [phone]    PRE-OP EVALUATION:  Today's date: 10/1/2018    Rojelio Mosqueda (: 1950) presents for pre-operative evaluation assessment as requested by Dr. Torres.  He requires evaluation and anesthesia risk assessment prior to undergoing surgery/procedure for treatment of knee cartilage detioration .    Proposed Surgery/ Procedure: Right knee replacement  Date of Surgery/ Procedure: 10/8/18  Time of Surgery/ Procedure: 7:20 am  Hospital/Surgical Facility: [facility]  Can see in [facility]   Primary Physician: Viviane Ann  Type of Anesthesia Anticipated: Spinal    Patient has a Health Care Directive or Living Will:  NO    1. NO - Do you have a history of heart attack, stroke, stent, bypass or surgery on an artery in the head, neck, heart or legs?  2. NO - Do you ever have any pain or discomfort in your chest?  3. NO - Do you have a history of  Heart Failure?  4. NO - Are you troubled by shortness of breath when: walking on the level, up a slight hill or at night?  5. NO - Do you currently have a cold, bronchitis or other respiratory infection?  6. YES - DO YOU HAVE A COUGH, SHORTNESS OF BREATH OR WHEEZING? Has a cough, that he relates to allergies  7. NO - Do you sometimes get pains in the calves of your legs when you walk?  8. NO - Do you or anyone in your family have previous history of blood clots?  9. NO - Do you or does anyone in your family have a serious bleeding problem such as prolonged bleeding following surgeries or cuts?  10. NO - Have you ever had problems with anemia or been told to take iron pills?  11. NO - Have you had any abnormal blood loss such as black, tarry or bloody stools, or abnormal vaginal bleeding?  12. NO - Have you ever had a blood transfusion?  13. NO - Have you or any of your relatives ever had problems with anesthesia?  14. NO - Do you have sleep  apnea, excessive snoring or daytime drowsiness?  15. NO - Do you have any prosthetic heart valves?  16. NO - Do you have prosthetic joints?  17. NO - Is there any chance that you may be pregnant?      HPI:     HPI related to upcoming procedure: right knee pain- worse in the last year-       MEDICAL HISTORY:     Patient Active Problem List    Diagnosis Date Noted     ACP (advance care planning) 07/06/2016     Priority: Medium     Advance Care Planning 7/6/2016: ACP Review of Chart / Resources Provided:  Reviewed chart for advance care plan.  Rojelio Mosqueda has no plan or code status on file. Discussed available resources and provided with information. Confirmed code status reflects current choices pending further ACP discussions.  Confirmed/documented legally designated decision makers.  Added by Babita Turcios             Hypothyroidism 05/04/2012     Priority: Medium     Problem list name updated by automated process. Provider to review       Elevated blood uric acid level 10/03/2011     Priority: Medium     (Problem list name updated by automated process. Provider to review and confirm.)       Peripheral neuropathy      Priority: Medium     Impotence of organic origin 01/15/2004     Priority: Medium     ESOPHAGEAL REFLUX/ hiatal hernia      Priority: Medium     Family history of diabetes mellitus      Priority: Medium     Health Care Home 11/29/2012     Priority: Low     State Tier Level:  Tier 1  Status:  NA  Care Coordinator:    See Letters for [facility] Care Plan            Past Medical History:   Diagnosis Date     Arthritis      Depressive disorder, not elsewhere classified 7/21/2002     Esophageal reflux      Family history of diabetes mellitus      GERD (gastroesophageal reflux disease)      Idiopathic neuropathy      Peripheral neuropathy      Thyroid disease      Past Surgical History:   Procedure Laterality Date     C NONSPECIFIC PROCEDURE  2/2011    upper endos/ mod hiatal hernia/ antireflux tx      COLONOSCOPY  2007    [place] office     HERNIA REPAIR      under the age of [age]     lt. foot-surgery       Current Outpatient Prescriptions   Medication Sig Dispense Refill     ACIDOPHILUS LACTOBACILLUS PO Take by mouth daily Takes two tabs       allopurinol (ZYLOPRIM) 100 MG tablet Take 2 tablets (200 mg) by mouth daily 180 tablet 3     cholecalciferol (VITAMIN D3) 15164 UNITS capsule Take 1 capsule (50,000 Units) by mouth once a week 12 capsule 0     levothyroxine (SYNTHROID/LEVOTHROID) 100 MCG tablet TAKE 1 TABLET (100 MCG) BY MOUTH DAILY 90 tablet 1     LYRICA 100 MG capsule TAKE 1 CAPSULE BY MOUTH 3 TIMES DAILY 270 capsule 0     mometasone (NASONEX) 50 MCG/ACT spray Spray 2 sprays into both nostrils daily 3 Box 3     mupirocin (BACTROBAN) 2 % nasal ointment Apply 1 g into each nare 2 times daily BID for 7 days prior to surgery       NONFORMULARY Yang-remidy one daily       NONFORMULARY A-reds2 one daily       Nutritional Supplements (ADULT NUTRITIONAL SUPPLEMENT OR)        omeprazole (PRILOSEC) 20 MG CR capsule Take 1 capsule (20 mg) by mouth daily 90 capsule 3     pregabalin (LYRICA) 150 MG capsule Take 2 capsules (300 mg) by mouth 2 times daily 360 capsule 3     tadalafil (CIALIS) 20 MG tablet Take 1 tablet (20 mg) by mouth daily as needed 20 tablet prn     NONFORMULARY L-Glutamine 1 tab at bedtime       OTC products: no recent use of OTC ASA, NSAIDS or Steroids    Allergies   Allergen Reactions     Cat Hair Extract Shortness Of Breath     Dust Mites Itching     No Known Drug Allergies       Latex Allergy: NO    Social History   Substance Use Topics     Smoking status: Former Smoker     Packs/day: 1.00     Years: 15.00     Types: Cigarettes     Quit date: 1/1/2004     Smokeless tobacco: Never Used      Comment: Smoked off & on     Alcohol use 1.5 oz/week     3 Standard drinks or equivalent per week      Comment: 2-3 days per week 1-2 drinks per time     History   Drug Use No       REVIEW OF SYSTEMS:  "  CONSTITUTIONAL: NEGATIVE for fever, chills, change in weight  ENT/MOUTH: NEGATIVE for ear, mouth and throat problems  RESP: NEGATIVE for significant cough or SOB  CV: NEGATIVE for chest pain, palpitations or peripheral edema  GI: NEGATIVE for nausea, abdominal pain, heartburn, or change in bowel habits (looser stools- more stools)    EXAM:   /68 (BP Location: Left arm, Patient Position: Sitting, Cuff Size: Adult Large)  Pulse 62  Temp 98.2  F (36.8  C) (Oral)  Ht 1.892 m (6' 2.5\")  Wt 117.5 kg (259 lb)  SpO2 98%  BMI 32.81 kg/m2  GENERAL APPEARANCE: healthy, alert and no distress  HENT: ear canals and TM's normal and nose and mouth without ulcers or lesions  RESP: lungs clear to auscultation - no rales, rhonchi or wheezes  CV: regular rate and rhythm, normal S1 S2, no S3 or S4 and no murmur, click or rub   ABDOMEN: soft, nontender, no HSM or masses and bowel sounds normal  NEURO: Normal strength and tone, sensory exam grossly normal, mentation intact and speech normal    DIAGNOSTICS:   EKG: Normal Sinus Rhythm, nonspecific ST-T changes    Hemoglobin   Date Value Ref Range Status   10/01/2018 14.2 13.3 - 17.7 g/dL Final   ]    Potassium   Date Value Ref Range Status   10/01/2018 4.2 3.5 - 5.3 mmol/L Final     Lab Results   Component Value Date    CR 0.96 10/01/2018         Recent Labs   Lab Test  06/22/18   0805  06/22/17   0921  09/09/16   1200   08/20/10   1201   HGB   --    --   14.6   --   13.7*   PLT   --    --   208   --   328   NA  139  137   --    < >   --    POTASSIUM  4.2  4.9   --    < >   --    CR  0.94  0.98   --    < >   --     < > = values in this interval not displayed.        IMPRESSION:   Reason for surgery/procedure: total knee replacement    The proposed surgical procedure is considered INTERMEDIATE risk.    REVISED CARDIAC RISK INDEX  The patient has the following serious cardiovascular risks for perioperative complications such as (MI, PE, VFib and 3  AV Block):  No serious cardiac " risks  INTERPRETATION: 0 risks: Class I (very low risk - 0.4% complication rate)    The patient has the following additional risks for perioperative complications:  No identified additional risks      ICD-10-CM    1. Preoperative examination Z01.818 BASIC METABOLIC PANEL ([facility])     HEMOGRAM/PLATELET ([facility])     Iron and iron binding capacity ([facility])     FERRITIN ([facility])     EKG 12-lead complete w/read - Clinics   2. Arthritis of knee M17.10 BASIC METABOLIC PANEL ([facility])     HEMOGRAM/PLATELET ([facility]FP)     Iron and iron binding capacity (QUEST)     FERRITIN ([facility])     EKG 12-lead complete w/read - Clinics   3. Acquired hypothyroidism E03.9 EKG 12-lead complete w/read - Clinics       RECOMMENDATIONS:     Using nasal bactroban- MRSA culture positive     --Patient is to take all scheduled medications on the day of surgery -  No vitamins or nutritional supplements    APPROVAL GIVEN to proceed with proposed procedure, without further diagnostic evaluation       Signed Electronically by: Viviane Ann MD    Copy of this evaluation report is provided to requesting physician.    [place] Preop Guidelines    Revised Cardiac Risk Index   no

## 2021-09-04 ENCOUNTER — HEALTH MAINTENANCE LETTER (OUTPATIENT)
Age: 71
End: 2021-09-04

## 2021-09-07 DIAGNOSIS — E03.9 ACQUIRED HYPOTHYROIDISM: ICD-10-CM

## 2021-09-07 PROCEDURE — 36415 COLL VENOUS BLD VENIPUNCTURE: CPT | Performed by: PHYSICIAN ASSISTANT

## 2021-09-08 ENCOUNTER — MYC MEDICAL ADVICE (OUTPATIENT)
Dept: FAMILY MEDICINE | Facility: CLINIC | Age: 71
End: 2021-09-08

## 2021-09-08 LAB
T4, FREE, NON-DIALYSIS - QUEST: 1.3 NG/DL (ref 0.8–1.8)
TSH SERPL-ACNC: 0.85 MIU/L (ref 0.4–4.5)

## 2021-10-29 ENCOUNTER — MYC MEDICAL ADVICE (OUTPATIENT)
Dept: FAMILY MEDICINE | Facility: CLINIC | Age: 71
End: 2021-10-29

## 2021-10-29 DIAGNOSIS — G62.9 NEUROPATHY: ICD-10-CM

## 2021-10-29 NOTE — TELEPHONE ENCOUNTER
Rojelio Mosqueda is requesting a refill of:    Pending Prescriptions:                       Disp   Refills    pregabalin (LYRICA) 150 MG capsule        360 ca*2            Sig: Take 2 capsules (300 mg) by mouth 2 times daily    Please close encounter if RX was sent. Thanks, Melyssa

## 2021-10-30 RX ORDER — PREGABALIN 150 MG/1
300 CAPSULE ORAL 2 TIMES DAILY
Qty: 360 CAPSULE | Refills: 2 | Status: SHIPPED | OUTPATIENT
Start: 2021-10-30 | End: 2022-06-30

## 2021-11-12 DIAGNOSIS — G62.9 NEUROPATHY: ICD-10-CM

## 2021-11-15 RX ORDER — DULOXETIN HYDROCHLORIDE 20 MG/1
CAPSULE, DELAYED RELEASE ORAL
Qty: 60 CAPSULE | Refills: 1 | COMMUNITY
Start: 2021-11-15

## 2021-11-15 NOTE — TELEPHONE ENCOUNTER
Received incoming refill request for  Pending Prescriptions:                       Disp   Refills    DULoxetine (CYMBALTA) 20 MG capsule [Phar*60 cap*1            Sig: TAKE ONE CAPSULE BY MOUTH ONCE DAILY FOR 2 WEEKS           THEN 2 CAPSULES BY MOUTH DAILY    Patient last had a refill of this medication on 8/2/21.    [facility] Wound    Who is the name of the provider?:  Georgiana      What is the location you see this provider at?: [place]    Reason for call:  Please call, having trouble with wound.  Very red, weeping and more painful.  Taking antibiotics given by UC.     Can we leave a detailed message on this number?  YES

## 2022-02-04 NOTE — TELEPHONE ENCOUNTER
Message from [employer]:  Rojelio Mosqueda would like a refill of the following medications:  pregabalin (LYRICA) 100 MG capsule [Viviane Ann MD]    Preferred pharmacy: [facility]/PHARMACY #[id number] [facility] [address] [address]    Comment:  Doctor Marissa, Could you please increase and renew my dosage for my Lyrica. I like the 100 mg capsules but would like to increase the dosage from three a day to five a day. I like the 100 mg dosage so if I am feeling better I can slowly reduce my dosage. Thanks, Miguel Mosqueda   Not applicable

## 2022-02-19 ENCOUNTER — HEALTH MAINTENANCE LETTER (OUTPATIENT)
Age: 72
End: 2022-02-19

## 2022-06-30 ENCOUNTER — OFFICE VISIT (OUTPATIENT)
Dept: FAMILY MEDICINE | Facility: CLINIC | Age: 72
End: 2022-06-30

## 2022-06-30 VITALS
OXYGEN SATURATION: 97 % | HEART RATE: 70 BPM | DIASTOLIC BLOOD PRESSURE: 78 MMHG | TEMPERATURE: 97.9 F | BODY MASS INDEX: 31.98 KG/M2 | SYSTOLIC BLOOD PRESSURE: 114 MMHG | HEIGHT: 74 IN | WEIGHT: 249.2 LBS

## 2022-06-30 DIAGNOSIS — R73.01 ELEVATED FASTING GLUCOSE: ICD-10-CM

## 2022-06-30 DIAGNOSIS — E27.9 ADRENAL NODULE (H): ICD-10-CM

## 2022-06-30 DIAGNOSIS — K21.9 CHRONIC GERD: ICD-10-CM

## 2022-06-30 DIAGNOSIS — E03.9 ACQUIRED HYPOTHYROIDISM: Primary | ICD-10-CM

## 2022-06-30 DIAGNOSIS — Z91.89 10 YEAR RISK OF MI OR STROKE 7.5% OR GREATER: ICD-10-CM

## 2022-06-30 DIAGNOSIS — S46.001A ROTATOR CUFF INJURY, RIGHT, INITIAL ENCOUNTER: ICD-10-CM

## 2022-06-30 DIAGNOSIS — N52.9 IMPOTENCE OF ORGANIC ORIGIN: ICD-10-CM

## 2022-06-30 DIAGNOSIS — E78.2 MIXED HYPERLIPIDEMIA: ICD-10-CM

## 2022-06-30 DIAGNOSIS — R09.81 NASAL CONGESTION: ICD-10-CM

## 2022-06-30 DIAGNOSIS — E79.0 ELEVATED BLOOD URIC ACID LEVEL: ICD-10-CM

## 2022-06-30 DIAGNOSIS — R13.14 PHARYNGOESOPHAGEAL DYSPHAGIA: ICD-10-CM

## 2022-06-30 DIAGNOSIS — E66.811 OBESITY (BMI 30.0-34.9): ICD-10-CM

## 2022-06-30 DIAGNOSIS — G62.9 PERIPHERAL POLYNEUROPATHY: ICD-10-CM

## 2022-06-30 DIAGNOSIS — M43.16 ANTEROLISTHESIS OF LUMBAR SPINE: ICD-10-CM

## 2022-06-30 DIAGNOSIS — K44.9 HIATAL HERNIA: ICD-10-CM

## 2022-06-30 DIAGNOSIS — M75.111 NONTRAUMATIC INCOMPLETE TEAR OF RIGHT ROTATOR CUFF: ICD-10-CM

## 2022-06-30 LAB
ALBUMIN SERPL-MCNC: 4.3 G/DL (ref 3.6–5.1)
ALBUMIN/GLOB SERPL: 1.5 {RATIO} (ref 1–2.5)
ALP SERPL-CCNC: 53 U/L (ref 33–130)
ALT 1742-6: 25 U/L (ref 0–32)
AST 1920-8: 19 U/L (ref 0–35)
BILIRUB SERPL-MCNC: 1.1 MG/DL (ref 0.2–1.2)
BUN SERPL-MCNC: 19 MG/DL (ref 7–25)
BUN/CREATININE RATIO: 17.1 (ref 6–22)
CALCIUM SERPL-MCNC: 9.6 MG/DL (ref 8.6–10.3)
CHLORIDE SERPLBLD-SCNC: 106.1 MMOL/L (ref 98–110)
CHOLEST SERPL-MCNC: 200 MG/DL (ref 0–199)
CHOLEST/HDLC SERPL: 5 {RATIO} (ref 0–5)
CO2 SERPL-SCNC: 27.3 MMOL/L (ref 20–32)
CREAT SERPL-MCNC: 1.11 MG/DL (ref 0.6–1.3)
GLOBULIN, CALCULATED - QUEST: 2.9 (ref 1.9–3.7)
GLUCOSE SERPL-MCNC: 104 MG/DL (ref 60–99)
HDLC SERPL-MCNC: 43 MG/DL (ref 40–150)
LDLC SERPL CALC-MCNC: 116 MG/DL (ref 0–130)
POTASSIUM SERPL-SCNC: 4.73 MMOL/L (ref 3.5–5.3)
PROT SERPL-MCNC: 7.2 G/DL (ref 6.1–8.1)
SODIUM SERPL-SCNC: 142.2 MMOL/L (ref 135–146)
TRIGL SERPL-MCNC: 205 MG/DL (ref 0–149)

## 2022-06-30 PROCEDURE — 36415 COLL VENOUS BLD VENIPUNCTURE: CPT | Performed by: PHYSICIAN ASSISTANT

## 2022-06-30 PROCEDURE — 80061 LIPID PANEL: CPT | Performed by: PHYSICIAN ASSISTANT

## 2022-06-30 PROCEDURE — 80053 COMPREHEN METABOLIC PANEL: CPT | Performed by: PHYSICIAN ASSISTANT

## 2022-06-30 PROCEDURE — 99215 OFFICE O/P EST HI 40 MIN: CPT | Performed by: PHYSICIAN ASSISTANT

## 2022-06-30 PROCEDURE — 72110 X-RAY EXAM L-2 SPINE 4/>VWS: CPT | Performed by: PHYSICIAN ASSISTANT

## 2022-06-30 RX ORDER — PREGABALIN 150 MG/1
300 CAPSULE ORAL 2 TIMES DAILY
Qty: 360 CAPSULE | Refills: 3 | Status: SHIPPED | OUTPATIENT
Start: 2022-06-30 | End: 2023-01-10

## 2022-06-30 RX ORDER — SILDENAFIL 50 MG/1
50-100 TABLET, FILM COATED ORAL DAILY PRN
Qty: 30 TABLET | Refills: 11 | Status: SHIPPED | OUTPATIENT
Start: 2022-06-30 | End: 2023-07-17

## 2022-06-30 RX ORDER — LEVOTHYROXINE SODIUM 112 UG/1
TABLET ORAL
Qty: 90 TABLET | Refills: 3 | Status: SHIPPED | OUTPATIENT
Start: 2022-06-30 | End: 2023-07-17

## 2022-06-30 RX ORDER — TADALAFIL 5 MG/1
TABLET ORAL
Qty: 30 TABLET | Refills: 2 | Status: CANCELLED | OUTPATIENT
Start: 2022-06-30

## 2022-06-30 RX ORDER — PANTOPRAZOLE SODIUM 40 MG/1
40 TABLET, DELAYED RELEASE ORAL DAILY
Qty: 90 TABLET | Refills: 3 | Status: SHIPPED | OUTPATIENT
Start: 2022-06-30 | End: 2022-12-23

## 2022-06-30 RX ORDER — MOMETASONE FUROATE MONOHYDRATE 50 UG/1
2 SPRAY, METERED NASAL DAILY
Qty: 17 G | Refills: 4 | Status: SHIPPED | OUTPATIENT
Start: 2022-06-30 | End: 2022-07-06

## 2022-06-30 NOTE — NURSING NOTE
Chief Complaint   Patient presents with     Recheck Medication     Fasting today, refill medications      Pre-visit Screening:  Immunizations:  up to date  Colonoscopy:  is up to date  Mammogram: NA  Asthma Action Test/Plan:  NA  PHQ9:  NA  GAD7:  NA  Questioned patient about current smoking habits Pt. quit smoking some time ago.  Ok to leave detailed message on voice mail for today's visit only Yes, phone # [phone]

## 2022-06-30 NOTE — PROGRESS NOTES
Assessment & Plan     Acquired hypothyroidism    - VENOUS COLLECTION  - levothyroxine (SYNTHROID/LEVOTHROID) 112 MCG tablet  Dispense: 90 tablet; Refill: 3  - T4 FREE ([facility])  - TSH ([facility])    Elevated blood uric acid level  Stay off allopurinol if not more flares  - VENOUS COLLECTION  - URIC ACID ([facility])    Peripheral polyneuropathy  Pt going to see if he can get into [place]  If not, email me and I will refer to neurologist  - pregabalin (LYRICA) 150 MG capsule  Dispense: 360 capsule; Refill: 3  - XR Lumbar Spine G/E 4 Views        Elevated fasting glucose    - VENOUS COLLECTION  - Comprehensive Metobolic Panel (BFP)    Adrenal nodule (H) FU with Endo 8/2021      Obesity (BMI 30.0-34.9)    - VENOUS COLLECTION  - Comprehensive Metobolic Panel (BFP)  - Lipid Panel (BFP)    Impotence of organic origin    - sildenafil (VIAGRA) 50 MG tablet  Dispense: 30 tablet; Refill: 11    Mixed hyperlipidemia    - VENOUS COLLECTION  - Comprehensive Metobolic Panel (BFP)  - Lipid Panel (BFP)    10 year risk of MI or stroke 7.5% or greater    - VENOUS COLLECTION  - Comprehensive Metobolic Panel (BFP)  - Lipid Panel (BFP)    Hiatal hernia    - pantoprazole (PROTONIX) 40 MG EC tablet  Dispense: 90 tablet; Refill: 3  - Adult GI  Referral - Procedure Only    Nasal congestion    - mometasone (NASONEX) 50 MCG/ACT nasal spray  Dispense: 17 g; Refill: 4    Pharyngoesophageal dysphagia    - pantoprazole (PROTONIX) 40 MG EC tablet  Dispense: 90 tablet; Refill: 3  - Adult GI  Referral - Procedure Only    Rotator cuff injury, right, initial encounter      Nontraumatic incomplete tear of right rotator cuff    - Physical Therapy Referral    Chronic GERD    - pantoprazole (PROTONIX) 40 MG EC tablet  Dispense: 90 tablet; Refill: 3        FUTURE APPOINTMENTS:       - Follow-up visit in 1 year fasting med check     47 minutes spent on the date of the encounter doing chart review, history and exam, documentation and further activities  "per the note        LEONCIO Romano  [facility]    Subjective     Nursing Notes:   Khadijah Rai CMA  6/30/2022 10:22 AM  Signed  Chief Complaint   Patient presents with     Recheck Medication     Fasting today, refill medications      Pre-visit Screening:  Immunizations:  up to date  Colonoscopy:  is up to date  Mammogram: NA  Asthma Action Test/Plan:  NA  PHQ9:  NA  GAD7:  NA  Questioned patient about current smoking habits Pt. quit smoking some time ago.  Ok to leave detailed message on voice mail for today's visit only Yes, phone # [phone]                Rojelio Mosqueda is a 71 year old male who presents to clinic today for the following health issues     HPI         Adrenal nodule - No follow up needed  Per Endo    Elevated uric Acid  - off allopurinol   Only 1 attack -   Had surgery on ankle in the 1990s - tore tendons  Rheumatologist consult completed       Pt wants to be seen by [place] for neurology workup  Previous neurologist Dr. Goldman at [facility] started after - walking long time on uneven rock  Twisted ankle and fell   Had recent xray with chiro  - \"something wrong\" with lumbar spine  Initially pain only with shoes on - now all the time  [place] had shots in feet - a  Couple years ago        ED  - Cialis now but $$$ - Viagra has worked in the past but Cialis works longer       GERD  - hiatal hernia - consider PPI - was on omeprazole for many years   A couple years ago before COVID - pains in the chest/stomach  Saw GI   Cutting foods out   Cut Omeprazole and GERD got better    Will now feel food getting stuck in esophagus  Called Nutritionist - on a probiotic     Will occ get burping sensation.       Mometasone - nasal spray   - year round      Left rotator cuff tear - \"medium\"  MRI in [place]      Should be on statin - not interested.     The 10-year ASCVD risk score (Chadwickmonika ARECHIGA Jr., et al., 2013) is: 15%    Values used to calculate the score:      Age: 71 years      Sex: " Male      Is Non- : No      Diabetic: No      Tobacco smoker: No      Systolic Blood Pressure: 114 mmHg      Is BP treated: No      HDL Cholesterol: 43 mg/dL      Total Cholesterol: 200 mg/dL      Hypothyroidism Follow-up      Since last visit, patient describes the following symptoms: Weight stable, no hair loss, no skin changes, no constipation, no loose stools          Current Medications  Current Outpatient Medications   Medication Sig Dispense Refill     ACIDOPHILUS LACTOBACILLUS PO Take 700 mg by mouth daily        carboxymethylcellulose PF (REFRESH PLUS) 0.5 % ophthalmic solution Place 2 drops into both eyes 3 times daily as needed for dry eyes       cholecalciferol 25 MCG (1000 UT) TABS Take 2,000 Units by mouth       Cyanocobalamin (B-12) 1000 MCG TBCR        famotidine (PEPCID) 10 MG tablet Take 1 tablet (10 mg) by mouth 2 times daily 180 tablet 3     levothyroxine (SYNTHROID/LEVOTHROID) 112 MCG tablet TAKE 1 TABLET(112 MCG) BY MOUTH DAILY 90 tablet 3     mometasone (NASONEX) 50 MCG/ACT nasal spray Spray 2 sprays into both nostrils daily 17 g 4     multivitamin w/minerals (MULTI-VITAMIN) tablet Take 1 tablet by mouth daily Pt uses [employer] brand       NONFORMULARY Yang-remedy B12 one daily       NONFORMULARY A-reds2 - 2 capsules daily       Omega-3 Fatty Acids (OMEGA-3 PO) Take 1,000 mg by mouth daily        pantoprazole (PROTONIX) 40 MG EC tablet Take 1 tablet (40 mg) by mouth daily 90 tablet 3     pregabalin (LYRICA) 150 MG capsule Take 2 capsules (300 mg) by mouth 2 times daily 360 capsule 3     sildenafil (VIAGRA) 50 MG tablet Take 1-2 tablets ( mg) by mouth daily as needed (30 min prior to intercourse) 30 tablet 11     tadalafil (CIALIS) 5 MG tablet Take 1 - 2 tablets 30 min prior to intercourse 30 tablet 2         Constitutional, HEENT, Cardiovascular, Pulmonary, GI and  systems are negative, except as otherwise noted.          Objective    /78 (BP Location:  "Right arm, Patient Position: Sitting, Cuff Size: Adult Large)   Pulse 70   Temp 97.9  F (36.6  C) (Temporal)   Ht 1.88 m (6' 2\")   Wt 113 kg (249 lb 3.2 oz)   SpO2 97%   BMI 32.00 kg/m    Body mass index is 32 kg/m .  Physical Exam   GENERAL: healthy, alert and no distress  RESP: lungs clear to auscultation - no rales, rhonchi or wheezes  CV: regular rate and rhythm, normal S1 S2, no S3 or S4, no murmur, click or rub, no peripheral edema and peripheral pulses strong  Abdomen: Normal bowel sounds. Soft, no masses appreciated, no organomegaly. Non-tender. No guarding, no rebound tenderness.   MS: no gross musculoskeletal defects noted          Xray: degenerative disc - retrolisthesis L4/L5    "

## 2022-06-30 NOTE — PATIENT INSTRUCTIONS
Schedule PT for rotator cuff with preferred provider  Call Tg referral coordinator with info and she will fax order    3. Start Protonix for acid reflux  4. If not improving schedule endoscopy    5. Try Viagra 50 mg 30 min prior to intercourse

## 2022-07-01 LAB
T4, FREE, NON-DIALYSIS - QUEST: 1.2 NG/DL (ref 0.8–1.8)
TSH SERPL-ACNC: 2.18 MIU/L (ref 0.4–4.5)
URATE SERPL-MCNC: 7.7 MG/DL (ref 4–8)

## 2022-07-07 ENCOUNTER — TRANSFERRED RECORDS (OUTPATIENT)
Dept: FAMILY MEDICINE | Facility: CLINIC | Age: 72
End: 2022-07-07

## 2022-08-24 ENCOUNTER — TELEPHONE (OUTPATIENT)
Dept: FAMILY MEDICINE | Facility: CLINIC | Age: 72
End: 2022-08-24

## 2022-08-24 DIAGNOSIS — G62.9 PERIPHERAL POLYNEUROPATHY: Primary | ICD-10-CM

## 2022-08-24 NOTE — TELEPHONE ENCOUNTER
Called Pt  I reviewed Case with Dr. Alarcon  I will order EMG through [facility]  [facility] to help facilitate this

## 2022-08-25 NOTE — TELEPHONE ENCOUNTER
Order was faxed to     [facility]  [address] [place] [address]  [phone] -- appt line  [phone] -- fax    I talked with patient regarding the EMG. He stated that he is leaving the state in October. Would like this done before he leaves. Patient was also given  [employer] ([phone]) to call regarding scheduling.

## 2022-08-25 NOTE — TELEPHONE ENCOUNTER
Please complete the pending EMG order. Please make note in comments what you would like -- need to order bilateral lower extremity EMG    Please let me know when you completed/signed the order so I can fax to     [facility]  [address] [place] [id number] [phone] -- appt line  [phone] -- fax    Thank you

## 2022-09-20 ENCOUNTER — MYC MEDICAL ADVICE (OUTPATIENT)
Dept: FAMILY MEDICINE | Facility: CLINIC | Age: 72
End: 2022-09-20

## 2022-09-20 NOTE — TELEPHONE ENCOUNTER
Tg - pt is scheduled for EMG 10/11  Can you find out how many days to get that result  Needs to see Dr. Alarcon for review/consult when the EMG is available    Please schedule him with Dr. Alarcon when EMG will be available    Gricel Strickland PA-C  9/20/2022

## 2022-09-21 ENCOUNTER — MYC MEDICAL ADVICE (OUTPATIENT)
Dept: FAMILY MEDICINE | Facility: CLINIC | Age: 72
End: 2022-09-21

## 2022-09-21 ENCOUNTER — TRANSFERRED RECORDS (OUTPATIENT)
Dept: FAMILY MEDICINE | Facility: CLINIC | Age: 72
End: 2022-09-21

## 2022-09-21 NOTE — TELEPHONE ENCOUNTER
I called \[facility]\"" [facility] ( [phone] opt 1, 5 ) regarding the EMG. I was advised that the results should be back within 3-5 business days from date of EMG.     Patient is scheduled 10/19/2022 at 9:30am with Dr. Dorian ARRIAGA

## 2022-10-22 ENCOUNTER — HEALTH MAINTENANCE LETTER (OUTPATIENT)
Age: 72
End: 2022-10-22

## 2022-11-17 ENCOUNTER — MYC MEDICAL ADVICE (OUTPATIENT)
Dept: FAMILY MEDICINE | Facility: CLINIC | Age: 72
End: 2022-11-17

## 2022-11-28 ENCOUNTER — MYC MEDICAL ADVICE (OUTPATIENT)
Dept: FAMILY MEDICINE | Facility: CLINIC | Age: 72
End: 2022-11-28

## 2022-12-23 ENCOUNTER — OFFICE VISIT (OUTPATIENT)
Dept: FAMILY MEDICINE | Facility: CLINIC | Age: 72
End: 2022-12-23

## 2022-12-23 VITALS
OXYGEN SATURATION: 96 % | HEART RATE: 69 BPM | WEIGHT: 225.4 LBS | DIASTOLIC BLOOD PRESSURE: 70 MMHG | RESPIRATION RATE: 16 BRPM | SYSTOLIC BLOOD PRESSURE: 116 MMHG | TEMPERATURE: 97.5 F | BODY MASS INDEX: 28.93 KG/M2 | HEIGHT: 74 IN

## 2022-12-23 DIAGNOSIS — Z23 NEED FOR VACCINATION: ICD-10-CM

## 2022-12-23 DIAGNOSIS — K21.9 GASTROESOPHAGEAL REFLUX DISEASE WITHOUT ESOPHAGITIS: Primary | ICD-10-CM

## 2022-12-23 PROBLEM — E03.9 HYPOTHYROIDISM: Status: ACTIVE | Noted: 2022-12-17

## 2022-12-23 PROBLEM — F41.9 ANXIETY: Status: ACTIVE | Noted: 2022-12-17

## 2022-12-23 PROBLEM — Z87.891 FORMER SMOKER: Status: ACTIVE | Noted: 2022-12-17

## 2022-12-23 PROCEDURE — G0008 ADMIN INFLUENZA VIRUS VAC: HCPCS | Performed by: PHYSICIAN ASSISTANT

## 2022-12-23 PROCEDURE — 99214 OFFICE O/P EST MOD 30 MIN: CPT | Mod: 25 | Performed by: PHYSICIAN ASSISTANT

## 2022-12-23 PROCEDURE — 90662 IIV NO PRSV INCREASED AG IM: CPT | Performed by: PHYSICIAN ASSISTANT

## 2022-12-23 RX ORDER — MECOBALAMIN 5000 MCG
15 TABLET,DISINTEGRATING ORAL DAILY
Qty: 90 CAPSULE | Refills: 1 | Status: SHIPPED | OUTPATIENT
Start: 2022-12-23 | End: 2023-07-06

## 2022-12-23 NOTE — PROGRESS NOTES
Assessment & Plan     Gastroesophageal reflux disease without esophagitis-concerns for resistance to Protonix/side effects from this medication. Patient needs to follow up with pulmonology for mild obstructive airway disease, but will change PPI to Prevacid and see if symptoms improve.  Call with any questions or concerns/worsening symptoms  - LANsoprazole (PREVACID) 15 MG DR capsule  Dispense: 90 capsule; Refill: 1  -Stop Pepcid and Protonix, start Prevacid, Can increase Prevacid to 30mg 1/day after 4-8 weeks  Consider GI referral if symptoms persist  Need for vaccination  Due today  - INFLUENZA VACCINE 65+ (FLUZONE HD)      Follow up 6 months OV or sooner with GI if needed-can place referral if patient requests this    No follow-ups on file.    Clem Diego, BEATRICE  [facility]    Subjective   Miguel is a 71 year old, presenting for the following health issues:  Gastrophageal Reflux (Pt state that has been getting some acid reflux - Heartburn- for 20 yrs - /Pt has been seeing lot of doctor - Heart , lung doctors  - Pt doesn't think is not his heart  ) and Recheck Medication (Pt state want to stable some care and get some med for acid reflux - )      HPI       Patient was on Nexium for many years for GERD. Had stomach issues, so eliminated Nexium and stomach problems went away. Also has hiatal hernia. Now on Pepcid and Protonix, got EGD which showed inflammation. Rechecked EGD, normal at that time. Told to stay on Protonix indefinitely. 1-2 months ago, started to have issues with breathing. Saw cardiology, negative workup. Had negative CT scan, saw pulmonology and did PFTs which showed mild probably obstructive airway disease (reviewed result with patient in clinic today). Patient feels the pantoprazole is the causative agent of his SOB-stopped this 3 days ago, now only on Pepcid. Feels the Pepcid is causing itching as well.      Review of Systems   Constitutional, HEENT, cardiovascular, pulmonary, gi  "and gu systems are negative, except as otherwise noted.      Objective    /70 (BP Location: Right arm, Patient Position: Sitting, Cuff Size: Adult Large)   Pulse 69   Temp 97.5  F (36.4  C) (Tympanic)   Resp 16   Ht 1.88 m (6' 2\")   Wt 102.2 kg (225 lb 6.4 oz)   SpO2 96%   BMI 28.94 kg/m    Body mass index is 28.94 kg/m .  Physical Exam   GENERAL: healthy, alert and no distress  NECK: no adenopathy, no asymmetry, masses, or scars and thyroid normal to palpation  RESP: lungs clear to auscultation - no rales, rhonchi or wheezes  CV: regular rate and rhythm, normal S1 S2, no S3 or S4, no murmur, click or rub, no peripheral edema and peripheral pulses strong  ABDOMEN: soft, nontender, no hepatosplenomegaly, no masses and bowel sounds normal  MS: no gross musculoskeletal defects noted, no edema  SKIN: no suspicious lesions or rashes  NEURO: Normal strength and tone, mentation intact and speech normal                      "

## 2022-12-23 NOTE — NURSING NOTE
Chief Complaint   Patient presents with     Gastrophageal Reflux     Pt state that has been getting some acid reflux - Heartburn- for 20 yrs -   Pt has been seeing lot of doctor - Heart , lung doctors  - Pt doesn't think is not his heart       Recheck Medication     Pt state want to stable some care and get some med for acid reflux -    Pre-visit Screening:  Immunizations:  up to date  Colonoscopy:  is up to date  Mammogram: N/A  Asthma Action Test/Plan:  N/A  PHQ9:  N/A  GAD7:  N/A  Questioned patient about current smoking habits Pt. quit smoking some time 30 yrs  ago.  Ok to leave detailed message on voice mail for today's visit only Yes  phone #   [phone]

## 2022-12-30 ENCOUNTER — TELEPHONE (OUTPATIENT)
Dept: FAMILY MEDICINE | Facility: CLINIC | Age: 72
End: 2022-12-30

## 2023-01-09 ENCOUNTER — MYC MEDICAL ADVICE (OUTPATIENT)
Dept: FAMILY MEDICINE | Facility: CLINIC | Age: 73
End: 2023-01-09

## 2023-01-09 DIAGNOSIS — G62.9 PERIPHERAL POLYNEUROPATHY: ICD-10-CM

## 2023-01-10 NOTE — TELEPHONE ENCOUNTER
Please review pt's MYChart message.    Rojelio Mosqueda is requesting a refill of:    Pending Prescriptions:                       Disp   Refills    pregabalin (LYRICA) 150 MG capsule        360 ca*1            Sig: Take 2 capsules (300 mg) by mouth 2 times daily

## 2023-01-11 RX ORDER — PREGABALIN 150 MG/1
300 CAPSULE ORAL 2 TIMES DAILY
Qty: 360 CAPSULE | Refills: 1 | Status: SHIPPED | OUTPATIENT
Start: 2023-01-11 | End: 2023-07-06

## 2023-01-11 NOTE — CONFIDENTIAL NOTE
Reviewed, refill appropriate.  RTC 6/23 for further refills  Clem Diego PA-C  [facility]

## 2023-04-01 ENCOUNTER — HEALTH MAINTENANCE LETTER (OUTPATIENT)
Age: 73
End: 2023-04-01

## 2023-07-06 ENCOUNTER — MYC MEDICAL ADVICE (OUTPATIENT)
Dept: FAMILY MEDICINE | Facility: CLINIC | Age: 73
End: 2023-07-06

## 2023-07-06 DIAGNOSIS — K21.9 GASTROESOPHAGEAL REFLUX DISEASE WITHOUT ESOPHAGITIS: ICD-10-CM

## 2023-07-06 DIAGNOSIS — G62.9 PERIPHERAL POLYNEUROPATHY: ICD-10-CM

## 2023-07-06 RX ORDER — PREGABALIN 150 MG/1
300 CAPSULE ORAL 2 TIMES DAILY
Qty: 120 CAPSULE | Refills: 0 | Status: SHIPPED | OUTPATIENT
Start: 2023-07-06 | End: 2023-07-17

## 2023-07-06 RX ORDER — MECOBALAMIN 5000 MCG
15 TABLET,DISINTEGRATING ORAL DAILY
Qty: 30 CAPSULE | Refills: 0 | Status: SHIPPED | OUTPATIENT
Start: 2023-07-06 | End: 2023-07-17

## 2023-07-06 NOTE — TELEPHONE ENCOUNTER
Rojelio Mosqueda is requesting a refill of:    Pending Prescriptions:                       Disp   Refills    LANsoprazole (PREVACID) 15 MG DR capsule  30 cap*0            Sig: Take 1 capsule (15 mg) by mouth daily    pregabalin (LYRICA) 150 MG capsule        120 ca*0            Sig: Take 2 capsules (300 mg) by mouth 2 times daily    Pt will be calling to make an appointment for a medication check

## 2023-07-10 RX ORDER — MECOBALAMIN 5000 MCG
TABLET,DISINTEGRATING ORAL
Qty: 30 CAPSULE | Refills: 0 | COMMUNITY
Start: 2023-07-10

## 2023-07-10 NOTE — TELEPHONE ENCOUNTER
Rojelio Mosqueda is requesting a refill of:    Refused Prescriptions:                       Disp   Refills    LANsoprazole (PREVACID) 15 MG DR capsule [*30 cap*0        Sig: TAKE 1 CAPSULE(15 MG) BY MOUTH DAILY  Refused By: REX FERNANDEZ  Reason for Refusal: Patient needs appointment  Reason for Refusal Comment: please fill #[phone]

## 2023-07-17 ENCOUNTER — OFFICE VISIT (OUTPATIENT)
Dept: FAMILY MEDICINE | Facility: CLINIC | Age: 73
End: 2023-07-17

## 2023-07-17 VITALS
TEMPERATURE: 98.6 F | OXYGEN SATURATION: 97 % | WEIGHT: 225 LBS | HEIGHT: 74 IN | HEART RATE: 55 BPM | SYSTOLIC BLOOD PRESSURE: 116 MMHG | BODY MASS INDEX: 28.88 KG/M2 | DIASTOLIC BLOOD PRESSURE: 76 MMHG

## 2023-07-17 DIAGNOSIS — E79.0 ELEVATED BLOOD URIC ACID LEVEL: ICD-10-CM

## 2023-07-17 DIAGNOSIS — Z00.00 ENCOUNTER FOR INITIAL ANNUAL WELLNESS VISIT (AWV) IN MEDICARE PATIENT: Primary | ICD-10-CM

## 2023-07-17 DIAGNOSIS — N52.9 IMPOTENCE OF ORGANIC ORIGIN: ICD-10-CM

## 2023-07-17 DIAGNOSIS — R73.01 ELEVATED FASTING GLUCOSE: ICD-10-CM

## 2023-07-17 DIAGNOSIS — G62.9 PERIPHERAL POLYNEUROPATHY: ICD-10-CM

## 2023-07-17 DIAGNOSIS — R09.81 NASAL CONGESTION: ICD-10-CM

## 2023-07-17 DIAGNOSIS — K21.9 GASTROESOPHAGEAL REFLUX DISEASE WITHOUT ESOPHAGITIS: ICD-10-CM

## 2023-07-17 DIAGNOSIS — Z13.220 SCREENING FOR LIPOID DISORDERS: ICD-10-CM

## 2023-07-17 DIAGNOSIS — E03.9 ACQUIRED HYPOTHYROIDISM: ICD-10-CM

## 2023-07-17 PROBLEM — E66.811 OBESITY (BMI 30.0-34.9): Status: RESOLVED | Noted: 2020-08-18 | Resolved: 2023-07-17

## 2023-07-17 LAB
BUN SERPL-MCNC: 20 MG/DL (ref 7–25)
BUN/CREATININE RATIO: 19.4 (ref 6–32)
CALCIUM SERPL-MCNC: 9.5 MG/DL (ref 8.6–10.3)
CHLORIDE SERPLBLD-SCNC: 105.2 MMOL/L (ref 98–110)
CHOLEST SERPL-MCNC: 186 MG/DL (ref 0–199)
CHOLEST/HDLC SERPL: 5 {RATIO} (ref 0–5)
CO2 SERPL-SCNC: 28.4 MMOL/L (ref 20–32)
CREAT SERPL-MCNC: 1.03 MG/DL (ref 0.6–1.3)
GLUCOSE SERPL-MCNC: 109 MG/DL (ref 60–99)
HDLC SERPL-MCNC: 41 MG/DL (ref 40–150)
LDLC SERPL CALC-MCNC: 115 MG/DL (ref 0–130)
POTASSIUM SERPL-SCNC: 4.81 MMOL/L (ref 3.5–5.3)
SODIUM SERPL-SCNC: 139.5 MMOL/L (ref 135–146)
TRIGL SERPL-MCNC: 150 MG/DL (ref 0–149)
URIC ACID (BFP): 6.7 (ref 2.5–7)

## 2023-07-17 PROCEDURE — 84550 ASSAY OF BLOOD/URIC ACID: CPT | Performed by: PHYSICIAN ASSISTANT

## 2023-07-17 PROCEDURE — 80048 BASIC METABOLIC PNL TOTAL CA: CPT | Performed by: PHYSICIAN ASSISTANT

## 2023-07-17 PROCEDURE — 36415 COLL VENOUS BLD VENIPUNCTURE: CPT | Performed by: PHYSICIAN ASSISTANT

## 2023-07-17 PROCEDURE — 80061 LIPID PANEL: CPT | Performed by: PHYSICIAN ASSISTANT

## 2023-07-17 PROCEDURE — 99214 OFFICE O/P EST MOD 30 MIN: CPT | Mod: 25 | Performed by: PHYSICIAN ASSISTANT

## 2023-07-17 PROCEDURE — G0438 PPPS, INITIAL VISIT: HCPCS | Performed by: PHYSICIAN ASSISTANT

## 2023-07-17 RX ORDER — MECOBALAMIN 5000 MCG
15 TABLET,DISINTEGRATING ORAL DAILY
Qty: 90 CAPSULE | Refills: 3 | Status: SHIPPED | OUTPATIENT
Start: 2023-07-17 | End: 2024-07-19

## 2023-07-17 RX ORDER — PREGABALIN 150 MG/1
300 CAPSULE ORAL 2 TIMES DAILY
Qty: 360 CAPSULE | Refills: 3 | Status: SHIPPED | OUTPATIENT
Start: 2023-07-17 | End: 2024-03-05

## 2023-07-17 RX ORDER — LEVOTHYROXINE SODIUM 112 UG/1
TABLET ORAL
Qty: 90 TABLET | Refills: 3 | Status: SHIPPED | OUTPATIENT
Start: 2023-07-17 | End: 2024-07-22

## 2023-07-17 RX ORDER — SILDENAFIL 50 MG/1
50-100 TABLET, FILM COATED ORAL DAILY PRN
Qty: 60 TABLET | Refills: 1 | Status: SHIPPED | OUTPATIENT
Start: 2023-07-17 | End: 2024-07-19

## 2023-07-17 RX ORDER — MOMETASONE FUROATE MONOHYDRATE 50 UG/1
2 SPRAY, METERED NASAL DAILY
Qty: 51 G | Refills: 3 | Status: SHIPPED | OUTPATIENT
Start: 2023-07-17 | End: 2024-05-29

## 2023-07-17 NOTE — PROGRESS NOTES
Assessment & Plan     Peripheral polyneuropathy-stable, refilled without change  Call with worsening symptoms  - pregabalin (LYRICA) 150 MG capsule  Dispense: 360 capsule; Refill: 3    Gastroesophageal reflux disease without esophagitis-stable, refilled without change  Call if any symptoms-pt has needed to change PPIs in the past  - LANsoprazole (PREVACID) 15 MG DR capsule  Dispense: 90 capsule; Refill: 3    Acquired hypothyroidism-stable on history and PE, refilled pending labs    - levothyroxine (SYNTHROID/LEVOTHROID) 112 MCG tablet  Dispense: 90 tablet; Refill: 3  - VENOUS COLLECTION  - TSH ([facility])  - T4 FREE ([facility])    Nasal congestion-stable, pt considering seeing ENT but will call if needing referral  - mometasone (NASONEX) 50 MCG/ACT nasal spray  Dispense: 51 g; Refill: 3    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, will refill as he had extra Cialis last year  Monitor for low BP, dizziness, facial flushing-call if any side effects  - sildenafil (VIAGRA) 50 MG tablet  Dispense: 60 tablet; Refill: 1    Screening for lipoid disorders  Pt fasting  - VENOUS COLLECTION  - Lipid Panel ([facility])    Elevated blood uric acid level-remote history of high uric acid and gout but no meds for a while, needs recheck    - VENOUS COLLECTION  - Uric Acid ([facility])    Elevated fasting glucose-recheck today    - VENOUS COLLECTION  - Basic Metabolic Panel ([facility])    Encounter for initial annual wellness visit (AWV) [facility] Medicare patient  [facility] reviewed and updated as appropriate        Follow up 1 year OV, sooner as needed    No follow-ups on file.    Clem Diego PA-C  [facility]    Subjective   Miguel is a 72 year old, presenting for the following health issues:  Recheck Medication (Fasting today, refill medications )    HPI     Hypothyroidism Follow-up      Since last visit, patient describes the following symptoms: Weight stable, no hair loss, no skin changes, no constipation, no loose stools    Nasonex:  "works well. No concerns. Using on PRN basis. Considering seeing ENT if symptoms worsen as surgery was recommended in the past.    Viagra: Hasn't used yet. Was using Cialis in the past. No side effects on Cialis.    Prevacid: working well, no concerns. Taking daily otherwise. No symptoms at this time.     Lyrica: working well, generally. Still having some neuropathy symptoms. No side effects at this time. Feels the med is helping as when he forgets to take, symptoms get much worse.    Needs recheck of labs, hx elevated glucose and uric acid in the past. No gout attacks in the last few years.     Review of Systems   Constitutional, HEENT, cardiovascular, pulmonary, gi and gu systems are negative, except as otherwise noted.      Objective    /76 (BP Location: Right arm, Patient Position: Sitting, Cuff Size: Adult Large)   Pulse 55   Temp 98.6  F (37  C) (Temporal)   Ht 1.88 m (6' 2\")   Wt 102.1 kg (225 lb)   SpO2 97%   BMI 28.89 kg/m    Body mass index is 28.89 kg/m .  Physical Exam   GENERAL: healthy, alert and no distress  HENT: ear canals and TM's normal, nose and mouth without ulcers or lesions  NECK: no adenopathy, no asymmetry, masses, or scars and thyroid normal to palpation  RESP: lungs clear to auscultation - no rales, rhonchi or wheezes  CV: regular rate and rhythm, normal S1 S2, no S3 or S4, no murmur, click or rub, no peripheral edema and peripheral pulses strong  ABDOMEN: soft, nontender, no hepatosplenomegaly, no masses and bowel sounds normal  MS: no gross musculoskeletal defects noted, no edema  SKIN: no suspicious lesions or rashes  NEURO: Normal strength and tone, mentation intact and speech normal                      "

## 2023-07-17 NOTE — NURSING NOTE
Chief Complaint   Patient presents with     Recheck Medication     Fasting today, refill medications      Pre-visit Screening:  Immunizations:  not up to date - tdap and shingrix at pharmacy  Colonoscopy:  is up to date  Mammogram: NA  Asthma Action Test/Plan:  NA  PHQ9:  NA  GAD7:  NA  Questioned patient about current smoking habits Pt. quit smoking some time ago.  Ok to leave detailed message on voice mail for today's visit only Yes, phone # [phone]

## 2023-07-17 NOTE — PROGRESS NOTES
Rojelio Mosqueda is a 72 year old male who presents for Medicare Annual Wellness Visit.    Current providers caring for this patient include:  Patient Care Team:  Clem Diego PA-C as PCP - General (Family Medicine)    Complete Medical and Social history reviewed with patient, outlined below.    Patient Active Problem List   Diagnosis     Family history of diabetes mellitus     Impotence of organic origin     Peripheral polyneuropathy     Elevated blood uric acid level     Acquired hypothyroidism     Health Care Home     Arthritis of knee     S/P total knee arthroplasty     Sinus bradycardia     Obesity (BMI 30.0-34.9)     Adrenal nodule (H) FU with Endo 8/2021     Elevated fasting glucose     Nasal congestion     Enlarged clavicle     Mixed hyperlipidemia     10 year risk of MI or stroke 7.5% or greater     Hiatal hernia     Anxiety     Former smoker     Hypothyroidism     GERD (gastroesophageal reflux disease)       Past Medical History:   Diagnosis Date     Arthritis      Family history of diabetes mellitus      GERD (gastroesophageal reflux disease)      Inflammatory 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  Right total knee arthroplasty.   2.  Intra-articular corticosteroid injection, left knee. ;  Surgeon: Javi Torres MD;  Location: [facility] OR     ARTHROPLASTY KNEE Left 08/26/2019    Procedure: Left total knee arthroplasty;  Surgeon: Javi Torres MD;  Location: [facility] OR     EGD  09/21/2022    esophageal dilation     HERNIA REPAIR      under the age of [age]     INJECT STEROID (LOCATION) Left 10/08/2018    Procedure: INJECT STEROID (LOCATION);;  Surgeon: Javi Torres MD;  Location:  OR     lt. foot-surgery       ZZC NONSPECIFIC PROCEDURE  02/2011    upper endos/ mod hiatal hernia/ antireflux tx  "      Family History   Problem Relation Age of Onset     Coronary Artery Disease Mother      Diabetes Mother      Coronary Artery Disease Father      Rheumatoid Arthritis Sister      Thyroid Disease Sister      Thyroid Disease Sister      Cancer No family hx of      Heart Disease No family hx of        Social History     Tobacco Use     Smoking status: Former     Packs/day: 1.00     Years: 15.00     Pack years: 15.00     Types: Cigarettes     Quit date: 2004     Years since quittin.5     Passive exposure: Never     Smokeless tobacco: Never     Tobacco comments:     Smoked off & on   Substance Use Topics     Alcohol use: Yes     Alcohol/week: 6.0 standard drinks of alcohol     Types: 6 Standard drinks or equivalent per week     Comment: 2-3 days per week 1-2 drinks per time       Diet: regular, low salt/low fat  Physical Activity: active without specific exercise program, pickelball and biking  Depression Screen:    Over the past 2 weeks, patient has felt down, depressed, or hopeless:  No    Over the past 2 weeks, patient has felt little interest or pleasure in doing things: No    Functional ability/Safety screen:  Up and go test (able to get up and walk longer than 30 seconds): Passed  Patient needs assistance with: nothing  Patient's home has the following possible safety concerns: none identified  Patient has concerns about his hearing:  Yes  Cognitive Screen  Patient repeats three objects (ball, flag, tree)      Clock drawing test:   NORMAL  Recalls three objects after 3 minutes (ball,flag,tree):                                                                                               recalls 3 objects (3 points)    Physical Exam:  /76 (BP Location: Right arm, Patient Position: Sitting, Cuff Size: Adult Large)   Pulse 55   Temp 98.6  F (37  C) (Temporal)   Ht 1.88 m (6' 2\")   Wt 102.1 kg (225 lb)   SpO2 97%   BMI 28.89 kg/m     Body mass index is 28.89 kg/m .        Health Maintenance "   Topic Date Due     TSH W/FREE T4 REFLEX  08/17/2021     COVID-19 Vaccine (5 - Pfizer series) 12/25/2023 (Originally 7/4/2022)     ZOSTER IMMUNIZATION (1 of 2) 08/02/2024 (Originally 12/29/2002)     DTAP/TDAP/TD IMMUNIZATION (2 - Td or Tdap) 08/02/2024 (Originally 1/18/2020)     INFLUENZA VACCINE (1) 09/01/2023     FALL RISK ASSESSMENT  12/23/2023     MEDICARE ANNUAL WELLNESS VISIT  07/17/2024     ADVANCE CARE PLANNING  08/29/2024     COLORECTAL CANCER SCREENING  03/18/2025     LIPID  06/30/2027     HEPATITIS C SCREENING  Completed     PHQ-2 (once per calendar year)  Completed     Pneumococcal Vaccine: 65+ Years  Completed     AORTIC ANEURYSM SCREENING (SYSTEM ASSIGNED)  Completed     IPV IMMUNIZATION  Aged Out     MENINGITIS IMMUNIZATION  Aged Out       End of Life Planning:   Patient currently has an advanced directive: Yes.  Practitioner is supportive of decision.    Education/Counseling:   Based on review of the above information, the following items were addressed:      Obesity - appropriate counseling on diet, exercise, etc.    Appropriate preventive services were discussed with this patient, including applicable screening as appropriate for cardiovascular disease, diabetes, osteopenia/osteoporosis, and glaucoma.  As appropriate for age/gender, discussed screening for colorectal cancer, prostate cancer, breast cancer, and cervical cancer.   Checklist reviewing preventive services available has been given to the patient.

## 2023-07-18 LAB
T4, FREE, NON-DIALYSIS - QUEST: 1 NG/DL (ref 0.8–1.8)
TSH SERPL-ACNC: 0.86 MIU/L (ref 0.4–4.5)

## 2024-03-05 ENCOUNTER — MYC REFILL (OUTPATIENT)
Dept: FAMILY MEDICINE | Facility: CLINIC | Age: 74
End: 2024-03-05

## 2024-03-05 DIAGNOSIS — G62.9 PERIPHERAL POLYNEUROPATHY: ICD-10-CM

## 2024-03-06 RX ORDER — PREGABALIN 150 MG/1
300 CAPSULE ORAL 2 TIMES DAILY
Qty: 360 CAPSULE | Refills: 0 | Status: SHIPPED | OUTPATIENT
Start: 2024-03-06 | End: 2024-05-29

## 2024-03-06 NOTE — TELEPHONE ENCOUNTER
Pt currently in [place] until June, he would like refill sent there.    Rojelio Mosqueda is requesting a refill of:    Pending Prescriptions:                       Disp   Refills    pregabalin (LYRICA) 150 MG capsule        360 ca*0            Sig: Take 2 capsules (300 mg) by mouth 2 times daily

## 2024-04-28 DIAGNOSIS — E03.9 ACQUIRED HYPOTHYROIDISM: ICD-10-CM

## 2024-04-30 RX ORDER — LEVOTHYROXINE SODIUM 112 UG/1
TABLET ORAL
COMMUNITY
Start: 2024-04-30

## 2024-04-30 NOTE — TELEPHONE ENCOUNTER
Rojelio Mosqueda is requesting a refill of:    Refused Prescriptions:                       Disp   Refills    levothyroxine (SYNTHROID/LEVOTHROID) 112 M*                Sig: TAKE 1 TABLET(112MCG) BY MOUTH EVERY DAY  Refused By: NATHANAEL FARRELL  Reason for Refusal: Patient has requested refill too soon    Refills local printed on 07/17/23 for 90 tab with 3 refills, enough medication until 07/2024

## 2024-05-29 ENCOUNTER — MYC REFILL (OUTPATIENT)
Dept: FAMILY MEDICINE | Facility: CLINIC | Age: 74
End: 2024-05-29

## 2024-05-29 DIAGNOSIS — R09.81 NASAL CONGESTION: ICD-10-CM

## 2024-05-29 DIAGNOSIS — G62.9 PERIPHERAL POLYNEUROPATHY: ICD-10-CM

## 2024-05-30 RX ORDER — PREGABALIN 150 MG/1
300 CAPSULE ORAL 2 TIMES DAILY
Qty: 240 CAPSULE | Refills: 0 | Status: SHIPPED | OUTPATIENT
Start: 2024-05-30 | End: 2024-06-10

## 2024-05-30 RX ORDER — MOMETASONE FUROATE MONOHYDRATE 50 UG/1
2 SPRAY, METERED NASAL DAILY
Qty: 51 G | Refills: 0 | Status: SHIPPED | OUTPATIENT
Start: 2024-05-30 | End: 2024-07-19

## 2024-05-30 NOTE — TELEPHONE ENCOUNTER
Approved  Clem Diego PA-C  [facility]    
Pt due for OV in July.    Rojelio Mosqueda is requesting a refill of:    Pending Prescriptions:                       Disp   Refills    mometasone (NASONEX) 50 MCG/ACT nasal spr*51 g   0            Sig: Spray 2 sprays into both nostrils daily    pregabalin (LYRICA) 150 MG capsule        240 ca*0            Sig: Take 2 capsules (300 mg) by mouth 2 times daily      
Universal Safety Interventions

## 2024-06-10 ENCOUNTER — MYC REFILL (OUTPATIENT)
Dept: FAMILY MEDICINE | Facility: CLINIC | Age: 74
End: 2024-06-10

## 2024-06-10 DIAGNOSIS — G62.9 PERIPHERAL POLYNEUROPATHY: ICD-10-CM

## 2024-06-10 NOTE — TELEPHONE ENCOUNTER
Patient is requesting that his  Pending Prescriptions:                       Disp   Refills    pregabalin (LYRICA) 150 MG capsule        240 ca*0            Sig: Take 2 capsules (300 mg) by mouth 2 times daily    Be resent to [facility] in [place] where he is currently at. Routing to Clem to send in-please review patients mychart message.

## 2024-06-12 RX ORDER — PREGABALIN 150 MG/1
300 CAPSULE ORAL 2 TIMES DAILY
Qty: 240 CAPSULE | Refills: 0 | Status: SHIPPED | OUTPATIENT
Start: 2024-06-12 | End: 2024-07-19

## 2024-07-19 ENCOUNTER — OFFICE VISIT (OUTPATIENT)
Dept: FAMILY MEDICINE | Facility: CLINIC | Age: 74
End: 2024-07-19

## 2024-07-19 VITALS
DIASTOLIC BLOOD PRESSURE: 80 MMHG | SYSTOLIC BLOOD PRESSURE: 110 MMHG | HEART RATE: 59 BPM | OXYGEN SATURATION: 99 % | BODY MASS INDEX: 31.58 KG/M2 | HEIGHT: 75 IN | RESPIRATION RATE: 20 BRPM | WEIGHT: 254 LBS | TEMPERATURE: 98.5 F

## 2024-07-19 DIAGNOSIS — E03.9 ACQUIRED HYPOTHYROIDISM: ICD-10-CM

## 2024-07-19 DIAGNOSIS — Z00.00 MEDICARE ANNUAL WELLNESS VISIT, SUBSEQUENT: Primary | ICD-10-CM

## 2024-07-19 DIAGNOSIS — R73.01 ELEVATED FASTING GLUCOSE: ICD-10-CM

## 2024-07-19 DIAGNOSIS — Z13.228 SCREENING FOR ENDOCRINE, METABOLIC AND IMMUNITY DISORDER: ICD-10-CM

## 2024-07-19 DIAGNOSIS — K21.9 GASTROESOPHAGEAL REFLUX DISEASE WITHOUT ESOPHAGITIS: ICD-10-CM

## 2024-07-19 DIAGNOSIS — Z13.220 SCREENING FOR LIPOID DISORDERS: ICD-10-CM

## 2024-07-19 DIAGNOSIS — N52.9 IMPOTENCE OF ORGANIC ORIGIN: ICD-10-CM

## 2024-07-19 DIAGNOSIS — Z13.0 SCREENING FOR ENDOCRINE, METABOLIC AND IMMUNITY DISORDER: ICD-10-CM

## 2024-07-19 DIAGNOSIS — G62.9 PERIPHERAL POLYNEUROPATHY: ICD-10-CM

## 2024-07-19 DIAGNOSIS — Z13.29 SCREENING FOR ENDOCRINE, METABOLIC AND IMMUNITY DISORDER: ICD-10-CM

## 2024-07-19 DIAGNOSIS — R09.81 NASAL CONGESTION: ICD-10-CM

## 2024-07-19 DIAGNOSIS — Z00.01 ENCOUNTER FOR GENERAL ADULT MEDICAL EXAMINATION WITH ABNORMAL FINDINGS: ICD-10-CM

## 2024-07-19 LAB
BUN SERPL-MCNC: 20 MG/DL (ref 7–25)
BUN/CREATININE RATIO: 18 (ref 6–32)
CALCIUM SERPL-MCNC: 9.6 MG/DL (ref 8.6–10.3)
CHLORIDE SERPLBLD-SCNC: 106.3 MMOL/L (ref 98–110)
CHOLEST SERPL-MCNC: 214 MG/DL (ref 0–199)
CHOLEST/HDLC SERPL: 5 {RATIO} (ref 0–5)
CO2 SERPL-SCNC: 28.6 MMOL/L (ref 20–32)
CREAT SERPL-MCNC: 1.11 MG/DL (ref 0.6–1.3)
GLUCOSE SERPL-MCNC: 114 MG/DL (ref 60–99)
HDLC SERPL-MCNC: 42 MG/DL (ref 40–150)
HEMOGLOBIN A1C: 5.7 % (ref 4–5.6)
LDLC SERPL CALC-MCNC: 135 MG/DL
POTASSIUM SERPL-SCNC: 4.78 MMOL/L (ref 3.5–5.3)
SODIUM SERPL-SCNC: 141.3 MMOL/L (ref 135–146)
TRIGL SERPL-MCNC: 184 MG/DL (ref 0–149)

## 2024-07-19 PROCEDURE — 36415 COLL VENOUS BLD VENIPUNCTURE: CPT | Performed by: PHYSICIAN ASSISTANT

## 2024-07-19 PROCEDURE — 80061 LIPID PANEL: CPT | Performed by: PHYSICIAN ASSISTANT

## 2024-07-19 PROCEDURE — 99214 OFFICE O/P EST MOD 30 MIN: CPT | Mod: 25 | Performed by: PHYSICIAN ASSISTANT

## 2024-07-19 PROCEDURE — 99397 PER PM REEVAL EST PAT 65+ YR: CPT | Performed by: PHYSICIAN ASSISTANT

## 2024-07-19 PROCEDURE — G0439 PPPS, SUBSEQ VISIT: HCPCS | Performed by: PHYSICIAN ASSISTANT

## 2024-07-19 PROCEDURE — 83036 HEMOGLOBIN GLYCOSYLATED A1C: CPT | Performed by: PHYSICIAN ASSISTANT

## 2024-07-19 PROCEDURE — 80048 BASIC METABOLIC PNL TOTAL CA: CPT | Performed by: PHYSICIAN ASSISTANT

## 2024-07-19 RX ORDER — SILDENAFIL 50 MG/1
50-100 TABLET, FILM COATED ORAL DAILY PRN
Qty: 60 TABLET | Refills: 1 | Status: SHIPPED | OUTPATIENT
Start: 2024-07-19

## 2024-07-19 RX ORDER — LEVOTHYROXINE SODIUM 112 UG/1
TABLET ORAL
Qty: 90 TABLET | Refills: 3 | Status: CANCELLED | OUTPATIENT
Start: 2024-07-19

## 2024-07-19 RX ORDER — MECOBALAMIN 5000 MCG
15 TABLET,DISINTEGRATING ORAL DAILY
Qty: 90 CAPSULE | Refills: 3 | Status: SHIPPED | OUTPATIENT
Start: 2024-07-19

## 2024-07-19 RX ORDER — PREGABALIN 150 MG/1
300 CAPSULE ORAL 2 TIMES DAILY
Qty: 360 CAPSULE | Refills: 3 | Status: SHIPPED | OUTPATIENT
Start: 2024-07-19

## 2024-07-19 RX ORDER — MOMETASONE FUROATE MONOHYDRATE 50 UG/1
2 SPRAY, METERED NASAL DAILY
Qty: 51 G | Refills: 3 | Status: SHIPPED | OUTPATIENT
Start: 2024-07-19

## 2024-07-19 NOTE — PROGRESS NOTES
Preventive Care Visit  [facility], PMORENA Diego PA-C, Family Medicine  2024      SUBJECTIVE:   Miguel is a 73 year old, presenting for the following:  Physical (Annual, fasting ), Medicare Visit (Annual medicare wellness visit), and Recheck Medication (Needs refills of medications )      HPI    Diet-good, homecooked  Exercise-3x a week, pickleball  Sleep-better, improving vs last year  BM-some constipation but resolved  Vitamin Use-multivitamin, vitamin D  Dentist-UTD  Eye Doctor-UTD  Colon-due   PSA-ordered      Neuropathic pain: Lyrica 600mg a day. Worsening neuropathy. Last saw neurology ~20 years ago, but did do EMG testing in last few years. Willing to fill Lyrica when needed for 1 year pending neuro consult.     GERD: chronic hiatal hernia. Prevacid daily.     Hypothyroidism Follow-up    Since last visit, patient describes the following symptoms: weight gain of 10lbs  Some fatigue while in [place]-normal testing at [facility] there. Levothyroxine 112mcg. Will fill pending labs. Cherie Muñoz    Nasonex: uses for nasal congestion. Continues to work well.     Viagra: uses for ED. Works well. No side effects.     Social History     Tobacco Use    Smoking status: Former     Current packs/day: 0.00     Average packs/day: 1 pack/day for 15.0 years (15.0 ttl pk-yrs)     Types: Cigarettes     Start date: 1989     Quit date: 2004     Years since quittin.5     Passive exposure: Never    Smokeless tobacco: Never    Tobacco comments:     Smoked off & on   Substance Use Topics    Alcohol use: Yes     Alcohol/week: 4.0 standard drinks of alcohol     Types: 4 Standard drinks or equivalent per week     Comment: 2-3 days per week 1-2 drinks per time              No data to display                Last PSA:   Abbott PSA   Date Value Ref Range Status   2021 1.0 < OR = 4.0 ng/mL Final     Comment:     The total PSA value from this assay system is   standardized against the WHO  standard. The test   result will be approximately 20% lower when compared   to the equimolar-standardized total PSA (Jeanne   [place]). Comparison of serial PSA results should be   interpreted with this fact in mind.     This test was performed using the Siemens   chemiluminescent method. Values obtained from   different assay methods cannot be used  interchangeably. PSA levels, regardless of  value, should not be interpreted as absolute  evidence of the presence or absence of disease.         Reviewed orders with patient. 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  Right total knee arthroplasty.   2.  Intra-articular corticosteroid injection, left knee. ;  Surgeon: Javi Torres MD;  Location:  OR    ARTHROPLASTY KNEE Left 08/26/2019    Procedure: Left total knee arthroplasty;  Surgeon: Javi Torres MD;  Location:  OR    EGD  09/21/2022    esophageal dilation    HERNIA REPAIR      under the age of [age]    INJECT STEROID (LOCATION) Left 10/08/2018    Procedure: INJECT STEROID (LOCATION);;  Surgeon: Javi Torres MD;  Location:  OR    lt. foot-surgery      Z NONSPECIFIC PROCEDURE  02/2011    upper endos/ mod hiatal hernia/ antireflux tx         Review of Systems  Constitutional, neuro, ENT, endocrine, pulmonary, cardiac, gastrointestinal,  "genitourinary, musculoskeletal, integument and psychiatric systems are negative, except as otherwise noted.    OBJECTIVE:   /80 (BP Location: Right arm, Patient Position: Sitting, Cuff Size: Adult Large)   Pulse 59   Temp 98.5  F (36.9  C) (Temporal)   Resp 20   Ht 1.892 m (6' 2.5\")   Wt 115.2 kg (254 lb)   SpO2 99%   BMI 32.18 kg/m     Estimated body mass index is 32.18 kg/m  as calculated from the following:    Height as of this encounter: 1.892 m (6' 2.5\").    Weight as of this encounter: 115.2 kg (254 lb).  Physical Exam  GENERAL: alert and no distress  EYES: Eyes grossly normal to inspection, PERRL and conjunctivae and sclerae normal  HENT: ear canals and TM's normal, nose and mouth without ulcers or lesions  NECK: no adenopathy, no asymmetry, masses, or scars  RESP: lungs clear to auscultation - no rales, rhonchi or wheezes  CV: regular rate and rhythm, normal S1 S2, no S3 or S4, no murmur, click or rub, no peripheral edema  ABDOMEN: soft, nontender, no hepatosplenomegaly, no masses and bowel sounds normal  MS: no gross musculoskeletal defects noted, no edema  NEURO: Normal strength and tone, mentation intact and speech normal  PSYCH: mentation appears normal, affect normal/bright    Diagnostic Test Results:  Labs reviewed in Epic    ASSESSMENT/PLAN:   Gastroesophageal reflux disease without esophagitis  Stable, good control continues  - LANsoprazole (PREVACID) 15 MG DR capsule  Dispense: 90 capsule; Refill: 3    Acquired hypothyroidism  Await labs, will fill pending these. 1 year if stable  - TSH ([facility])  - T4 FREE ([facility])  - PSA Total ([facility])    Nasal congestion  Stable, good control continues  - mometasone (NASONEX) 50 MCG/ACT nasal spray  Dispense: 51 g; Refill: 3    Peripheral polyneuropathy  Worsening unfortunately. Will get a consult from neurology for other treatment options, in the meantime fill meds unchanged  - pregabalin (LYRICA) 150 MG capsule  Dispense: 360 capsule; Refill: 3  - " Adult Neurology  Referral - To a [facility] Location (Use POS/Location)    Medicare annual wellness visit, subsequent   UTD, due for Tdap and RSV (will get at outside pharmacy    Encounter for general adult medical examination with abnormal findings    - VENOUS COLLECTION  - Lipid Panel ([facility])  - Basic Metabolic Panel ([facility])    Impotence of organic origin  Stable, working well  - sildenafil (VIAGRA) 50 MG tablet  Dispense: 60 tablet; Refill: 1    Screening for lipoid disorders    - VENOUS COLLECTION  - Lipid Panel ([facility])    Screening for endocrine, metabolic and immunity disorder    - VENOUS COLLECTION  - Basic Metabolic Panel ([facility])    Follow up 1 year OV    Patient has been advised of split billing requirements and indicates understanding: Yes      Counseling  Reviewed preventive health counseling, as reflected in patient instructions       Regular exercise       Healthy diet/nutrition       Vision screening       Colorectal cancer screening       Prostate cancer screening        He reports that he quit smoking about 20 years ago. His smoking use included cigarettes. He started smoking about 35 years ago. He has a 15 pack-year smoking history. He has never been exposed to tobacco smoke. He has never used smokeless tobacco.            Signed Electronically by: Clem Diego PA-C

## 2024-07-19 NOTE — PROGRESS NOTES
Rojelio Mosqueda is a 73 year old male who presents for Medicare Annual Wellness Visit.    Current providers caring for this patient include:  Patient Care Team:  Clem Diego PA-C as PCP - General (Family Medicine)  [facility], [facility] as Assigned PCP    Complete Medical and Social history reviewed with patient, outlined below.    Patient Active Problem List   Diagnosis    Family history of diabetes mellitus    Impotence of organic origin    Peripheral polyneuropathy    Elevated blood uric acid level    Acquired hypothyroidism    Arthritis of knee    S/P total knee arthroplasty    Sinus bradycardia    Adrenal nodule (H) FU with Endo 8/2021    Elevated fasting glucose    Nasal congestion    Enlarged clavicle    Mixed hyperlipidemia    10 year risk of MI or stroke 7.5% or greater    Hiatal hernia    Anxiety    Former smoker    Hypothyroidism    GERD (gastroesophageal reflux disease)       Past Medical History:   Diagnosis Date    Arthritis     Family history of diabetes mellitus     GERD (gastroesophageal reflux disease)     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  Right total knee arthroplasty.   2.  Intra-articular corticosteroid injection, left knee. ;  Surgeon: Javi Torres MD;  Location: [facility]    ARTHROPLASTY KNEE Left 08/26/2019    Procedure: Left total knee arthroplasty;  Surgeon: Javi Torres MD;  Location: [facility] OR    EGD  09/21/2022    esophageal dilation    HERNIA REPAIR      under the age of [age]    INJECT STEROID (LOCATION) Left 10/08/2018    Procedure: INJECT STEROID (LOCATION);;  Surgeon: Javi Torres MD;  Location: [facility] OR    lt. foot-surgery      ZZC NONSPECIFIC PROCEDURE  02/2011    upper endos/ mod hiatal hernia/ antireflux tx       Family History   Problem  Relation Age of Onset    Coronary Artery Disease Mother     Diabetes Mother     Coronary Artery Disease Father     Rheumatoid Arthritis Sister     Thyroid Disease Sister     Thyroid Disease Sister     Cancer No family hx of     Heart Disease No family hx of        Social History     Tobacco Use    Smoking status: Former     Current packs/day: 0.00     Average packs/day: 1 pack/day for 15.0 years (15.0 ttl pk-yrs)     Types: Cigarettes     Start date: 1989     Quit date: 2004     Years since quittin.5     Passive exposure: Never    Smokeless tobacco: Never    Tobacco comments:     Smoked off & on   Substance Use Topics    Alcohol use: Yes     Alcohol/week: 6.0 standard drinks of alcohol     Types: 6 Standard drinks or equivalent per week     Comment: 2-3 days per week 1-2 drinks per time       Diet: feels he is good with low fat, likes to use salt, feels he could eat more fruit but wife makes sure that he eats healthy overall, wife is aware of bad foods   Physical Activity: patient exercises 3 times weekly-has gym equipment in basement and likes to play pickle ball, likes to bike   Depression Screen:    Over the past 2 weeks, patient has felt down, depressed, or hopeless:  No    Over the past 2 weeks, patient has felt little interest or pleasure in doing things: No    Functional ability/Safety screen:  Up and go test (able to get up and walk longer than 30 seconds): Passed  Patient needs assistance with: nothing-fully independent   Patient's home has the following possible safety concerns: none identified  Patient has concerns about his hearing:  No  Cognitive Screen  Patient repeats three objects (ball, flag, tree)      Clock drawing test:   NORMAL  Recalls three objects after 3 minutes (ball,flag,tree):                                                                                               recalls 3 objects (3 points)    Physical Exam:  /80 (BP Location: Right arm, Patient Position:  "Sitting, Cuff Size: Adult Large)   Pulse 59   Temp 98.5  F (36.9  C) (Temporal)   Resp 20   Ht 1.892 m (6' 2.5\")   Wt 115.2 kg (254 lb)   SpO2 99%   BMI 32.18 kg/m     Body mass index is 32.18 kg/m .       End of Life Planning:   Patient currently has an advanced directive: Yes.  Practitioner is supportive of decision.    Education/Counseling:   Based on review of the above information, the following items were addressed:      Obesity - appropriate counseling on diet, exercise, etc.    Appropriate preventive services were discussed with this patient, including applicable screening as appropriate for cardiovascular disease, diabetes, osteopenia/osteoporosis, and glaucoma.  As appropriate for age/gender, discussed screening for colorectal cancer, prostate cancer, breast cancer, and cervical cancer.   Checklist reviewing preventive services available has been given to the patient.       "

## 2024-07-19 NOTE — NURSING NOTE
Chief Complaint   Patient presents with    Physical     Annual, fasting     Medicare Visit     Annual medicare wellness visit    Recheck Medication     Needs refills of medications      Pre-visit Screening:  Immunizations:  up to date  Colonoscopy:  is up to date  Mammogram: diamante  Asthma Action Test/Plan:  diamante  PHQ9:  phq2 done today   GAD7:  na  Questioned patient about current smoking habits Pt. quit smoking some time ago.  Ok to leave detailed message on voice mail for today's visit only yes, phone # [phone] (home)

## 2024-07-20 LAB
ABBOTT PSA - QUEST: 1.21 NG/ML
T4, FREE, NON-DIALYSIS - QUEST: 1.2 NG/DL (ref 0.8–1.8)
TSH SERPL-ACNC: 1.28 MIU/L (ref 0.4–4.5)

## 2024-07-22 RX ORDER — LEVOTHYROXINE SODIUM 112 UG/1
TABLET ORAL
Qty: 90 TABLET | Refills: 3 | Status: SHIPPED | OUTPATIENT
Start: 2024-07-22

## 2024-07-30 ENCOUNTER — TELEPHONE (OUTPATIENT)
Dept: FAMILY MEDICINE | Facility: CLINIC | Age: 74
End: 2024-07-30

## 2024-07-30 DIAGNOSIS — G62.9 PERIPHERAL POLYNEUROPATHY: Primary | ICD-10-CM

## 2024-07-30 NOTE — TELEPHONE ENCOUNTER
[facility] faxed back the referral stating [facility] does not provide pain management services.     Clem Diego PA-C please review.     Redirect referral to     [facility]   [address]  [place] [address] [phone] -- phone  [phone] -- fax     [facility]  [id number] [place] [address] [address]  [place] [address] [phone] -- appt line  [phone] -- fax    Please complete/sign the pending Pain Management Referral if this is the direction.

## 2024-11-12 ENCOUNTER — TELEPHONE (OUTPATIENT)
Dept: FAMILY MEDICINE | Facility: CLINIC | Age: 74
End: 2024-11-12

## 2024-11-12 NOTE — TELEPHONE ENCOUNTER
Patient reached out regarding a bill he received from [employer] for the PSA that was done 7.19.2024. Patients insurance denied the PSA as non covered. Message was sent to Clem Diego PA-C to review diagnosis code that was associated -- E03.9. This was associated to the PSA in error. Clem gave approval to change the diagnosis code to Z12.5    I called [employer] ( [phone] ),talked with Bismark. Bismark changed the diagnosis code to Z12.5. Bismark said to advise patient to disregard the bill he received as a corrected claim has been filed to his insurance. Patient will receive a new bill if Medicare applies a patient responsibility.     I left a message for patient to call me back. Will review above information.     BART

## 2025-02-02 ENCOUNTER — MYC REFILL (OUTPATIENT)
Dept: FAMILY MEDICINE | Facility: CLINIC | Age: 75
End: 2025-02-02

## 2025-02-02 DIAGNOSIS — G62.9 PERIPHERAL POLYNEUROPATHY: ICD-10-CM

## 2025-02-03 RX ORDER — PREGABALIN 150 MG/1
300 CAPSULE ORAL 2 TIMES DAILY
COMMUNITY
Start: 2025-02-03

## 2025-02-03 NOTE — TELEPHONE ENCOUNTER
Refused Prescriptions:                       Disp   Refills    pregabalin (LYRICA) 150 MG capsule                         Sig: Take 2 capsules (300 mg) by mouth 2 times daily.  Refused By: DENISSE SHELBY  Reason for Refusal: Should already have refills on file    Refilled on 7-19-24 for one year  Denisse

## 2025-02-06 DIAGNOSIS — G62.9 PERIPHERAL POLYNEUROPATHY: ICD-10-CM

## 2025-02-06 RX ORDER — PREGABALIN 150 MG/1
300 CAPSULE ORAL 2 TIMES DAILY
Qty: 360 CAPSULE | Refills: 1 | Status: SHIPPED | OUTPATIENT
Start: 2025-02-06

## 2025-02-06 NOTE — TELEPHONE ENCOUNTER
[facility] Mosqu[facility] is requesting a refill of:    Pending Prescriptions:                       Disp   Refills    pregabalin (LYRICA) 150 MG capsule [Pharm*360 ca*1            Sig: TAKE 2 CAPSULES BY MOUTH TWICE DAILY FOR 90 DAYS    Needs to be sent to pharmacy in [place]

## 2025-05-14 DIAGNOSIS — G62.9 PERIPHERAL POLYNEUROPATHY: ICD-10-CM

## 2025-05-14 RX ORDER — PREGABALIN 150 MG/1
300 CAPSULE ORAL 2 TIMES DAILY
COMMUNITY
Start: 2025-05-14

## 2025-05-14 NOTE — TELEPHONE ENCOUNTER
[facility] Mosqueda is requesting a refill of:    Refused Prescriptions:                       Disp   Refills    pregabalin (LYRICA) 150 MG capsule [Pharma*                Sig: TAKE 2 CAPSULES BY MOUTH TWICE DAILY  Refused By: NATHANAEL FARRELL  Reason for Refusal: OTHER  Reason for Refusal Comment: Refills sent on 02/06/25 for 360 tab with 1 refill    Denied refill from [facility], pt had refills sent on 02/06/25 to [facility]

## 2025-05-22 ENCOUNTER — OFFICE VISIT (OUTPATIENT)
Dept: FAMILY MEDICINE | Facility: CLINIC | Age: 75
End: 2025-05-22

## 2025-05-22 VITALS
BODY MASS INDEX: 32.5 KG/M2 | WEIGHT: 256.6 LBS | OXYGEN SATURATION: 98 % | DIASTOLIC BLOOD PRESSURE: 82 MMHG | HEART RATE: 55 BPM | TEMPERATURE: 97.9 F | SYSTOLIC BLOOD PRESSURE: 118 MMHG

## 2025-05-22 DIAGNOSIS — K21.9 GASTROESOPHAGEAL REFLUX DISEASE WITHOUT ESOPHAGITIS: ICD-10-CM

## 2025-05-22 DIAGNOSIS — G62.9 PERIPHERAL POLYNEUROPATHY: ICD-10-CM

## 2025-05-22 DIAGNOSIS — E78.2 MIXED HYPERLIPIDEMIA: ICD-10-CM

## 2025-05-22 DIAGNOSIS — N52.9 IMPOTENCE OF ORGANIC ORIGIN: ICD-10-CM

## 2025-05-22 DIAGNOSIS — E03.9 ACQUIRED HYPOTHYROIDISM: ICD-10-CM

## 2025-05-22 DIAGNOSIS — Z12.11 SCREENING FOR MALIGNANT NEOPLASM OF COLON: Primary | ICD-10-CM

## 2025-05-22 DIAGNOSIS — R09.81 NASAL CONGESTION: ICD-10-CM

## 2025-05-22 LAB
BUN SERPL-MCNC: 21 MG/DL (ref 7–25)
BUN/CREATININE RATIO: 19 (ref 6–32)
CALCIUM SERPL-MCNC: 9.4 MG/DL (ref 8.6–10.3)
CHLORIDE SERPLBLD-SCNC: 104.4 MMOL/L (ref 98–110)
CHOLEST SERPL-MCNC: 188 MG/DL (ref 0–199)
CHOLEST/HDLC SERPL: 5 {RATIO} (ref 0–5)
CO2 SERPL-SCNC: 25.6 MMOL/L (ref 20–32)
CREAT SERPL-MCNC: 1.12 MG/DL (ref 0.6–1.3)
GLUCOSE SERPL-MCNC: 107 MG/DL (ref 60–99)
HDLC SERPL-MCNC: 39 MG/DL (ref 40–150)
HEMOGLOBIN A1C: 5.6 % (ref 4–5.6)
LDLC SERPL CALC-MCNC: 109 MG/DL (ref 0–129)
POTASSIUM SERPL-SCNC: 4.75 MMOL/L (ref 3.5–5.3)
SODIUM SERPL-SCNC: 139.7 MMOL/L (ref 135–146)
TRIGL SERPL-MCNC: 198 MG/DL (ref 0–149)

## 2025-05-22 RX ORDER — LEVOTHYROXINE SODIUM 112 UG/1
TABLET ORAL
Qty: 90 TABLET | Refills: 3 | Status: SHIPPED | OUTPATIENT
Start: 2025-05-22

## 2025-05-22 RX ORDER — MOMETASONE FUROATE MONOHYDRATE 50 UG/1
2 SPRAY, METERED NASAL DAILY
Qty: 51 G | Refills: 3 | Status: SHIPPED | OUTPATIENT
Start: 2025-05-22

## 2025-05-22 RX ORDER — SILDENAFIL 50 MG/1
50-100 TABLET, FILM COATED ORAL DAILY PRN
Qty: 60 TABLET | Refills: 1 | Status: SHIPPED | OUTPATIENT
Start: 2025-05-22

## 2025-05-22 RX ORDER — PREGABALIN 150 MG/1
300 CAPSULE ORAL 2 TIMES DAILY
Qty: 360 CAPSULE | Refills: 3 | Status: SHIPPED | OUTPATIENT
Start: 2025-05-22

## 2025-05-22 RX ORDER — MECOBALAMIN 5000 MCG
15 TABLET,DISINTEGRATING ORAL DAILY
Qty: 90 CAPSULE | Refills: 3 | Status: SHIPPED | OUTPATIENT
Start: 2025-05-22

## 2025-05-22 RX ORDER — MECOBALAMIN 5000 MCG
15 TABLET,DISINTEGRATING ORAL DAILY
COMMUNITY

## 2025-05-22 NOTE — NURSING NOTE
Chief Complaint   Patient presents with    Recheck Medication     Fasting med check and refill      Pre-visit Screening:  Immunizations:  not up to date - tdap at pharmacy   Colonoscopy:  is due and to be scheduled by patient for later completion  Mammogram: [facility]  Asthma Action Test/Plan:  diamante  PHQ9:  phq2 given  GAD7:  diamante  Questioned patient about current smoking habits Pt. quit smoking some time ago.  Ok to leave detailed message on voice mail for today's visit only yes, phone # [phone] (home)

## 2025-05-22 NOTE — PROGRESS NOTES
Assessment & Plan     Gastroesophageal reflux disease without esophagitis-due for EGD, ordered and refilled med without change    - LANsoprazole (PREVACID) 15 MG DR capsule  Dispense: 90 capsule; Refill: 3  - Adult GI  Referral - Procedure Only    Acquired hypothyroidism  Clinically stable, refilled pending labs  - levothyroxine (SYNTHROID/LEVOTHROID) 112 MCG tablet  Dispense: 90 tablet; Refill: 3  - VENOUS COLLECTION  - TSH ([facility])  - T4 FREE ([facility])    Nasal congestion  Stable, refilled pending labs  - mometasone (NASONEX) 50 MCG/ACT nasal spray  Dispense: 51 g; Refill: 3    Peripheral polyneuropathy  Stable, plans to see neuro in next 1-2 months, in the meantime refilled [facility]ling unchanged  - pregabalin (LYRICA) 150 MG capsule  Dispense: 360 capsule; Refill: 3  - HEMOGLOBIN A1C ([facility])    Impotence of organic origin  Stable, meds continue to work well  - sildenafil (VIAGRA) 50 MG tablet  Dispense: 60 tablet; Refill: 1  - Basic Metabolic Panel ([facility])    Screening for malignant neoplasm of colon    - Colonoscopy Screening  Referral    Mixed hyperlipidemia  Recheck today, elevated previously, may need statin if future weight loss doesn't improve numbers  - VENOUS COLLECTION  - Lipid Panel ([facility])  - HEMOGLOBIN A1C ([facility])      40 minutes spent with patient in outside chart review, history, chart completion excluding procedures on same day of appointment.      Follow up 1 year OV    Subjective   Miguel is a 74 year old, presenting for the following health issues:  Recheck Medication (Fasting med check and refill )    HPI        GERD: lansoprazole 15mg daily. Due for colonoscopy and EGD this year.     ED: Viagra PRN, using 100mg generally. Working well.     Neuropathy: Lyrica daily, working well, no issues.     Hypothyroidism: levothyroxine daily. No issues.     Nasal concerns: Nasonex daily, PRN. No concerns.     Lipids: elevated ASCVD, wants to wait for weight loss. Trying red rice pills for lipids, helped  his wife a lot.     Didn't complete neuro referral last July-will do now with [facility]-will call if needing new referral.       Review of Systems  Constitutional, neuro, ENT, endocrine, pulmonary, cardiac, gastrointestinal, genitourinary, musculoskeletal, integument and psychiatric systems are negative, except as otherwise noted.      Objective    /82 (BP Location: Right arm, Patient Position: Sitting, Cuff Size: Adult Regular)   Pulse 55   Temp 97.9  F (36.6  C) (Temporal)   Wt 116.4 kg (256 lb 9.6 oz)   SpO2 98%   BMI 32.50 kg/m    Body mass index is 32.5 kg/m .  Physical Exam   GENERAL: alert and no distress  NECK: no adenopathy, no asymmetry, masses, or scars  RESP: lungs clear to auscultation - no rales, rhonchi or wheezes  CV: regular rate and rhythm, normal S1 S2, no S3 or S4, no murmur, click or rub, no peripheral edema  ABDOMEN: soft, nontender, no hepatosplenomegaly, no masses and bowel sounds normal  MS: no gross musculoskeletal defects noted, no edema  NEURO: Normal strength and tone, mentation intact and speech normal  PSYCH: mentation appears normal, affect normal/bright    Results for orders placed or performed in visit on 05/22/25 (from the past 24 hours)   HEMOGLOBIN A1C ([facility])   Result Value Ref Range    Hemoglobin A1C 5.6 4 - 5.6 %           Signed Electronically by: Clem Diego PA-C

## 2025-05-23 ENCOUNTER — RESULTS FOLLOW-UP (OUTPATIENT)
Dept: FAMILY MEDICINE | Facility: CLINIC | Age: 75
End: 2025-05-23

## 2025-06-18 ENCOUNTER — OFFICE VISIT (OUTPATIENT)
Dept: FAMILY MEDICINE | Facility: CLINIC | Age: 75
End: 2025-06-18

## 2025-06-18 VITALS
BODY MASS INDEX: 32.38 KG/M2 | SYSTOLIC BLOOD PRESSURE: 122 MMHG | HEART RATE: 64 BPM | OXYGEN SATURATION: 95 % | WEIGHT: 255.6 LBS | DIASTOLIC BLOOD PRESSURE: 80 MMHG

## 2025-06-18 DIAGNOSIS — E66.811 CLASS 1 OBESITY DUE TO EXCESS CALORIES WITH SERIOUS COMORBIDITY AND BODY MASS INDEX (BMI) OF 32.0 TO 32.9 IN ADULT: Primary | ICD-10-CM

## 2025-06-18 DIAGNOSIS — E66.09 CLASS 1 OBESITY DUE TO EXCESS CALORIES WITH SERIOUS COMORBIDITY AND BODY MASS INDEX (BMI) OF 32.0 TO 32.9 IN ADULT: Primary | ICD-10-CM

## 2025-06-18 DIAGNOSIS — Z71.3 ENCOUNTER FOR WEIGHT LOSS COUNSELING: ICD-10-CM

## 2025-06-18 PROCEDURE — 99214 OFFICE O/P EST MOD 30 MIN: CPT | Performed by: PHYSICIAN ASSISTANT

## 2025-06-18 PROCEDURE — G2211 COMPLEX E/M VISIT ADD ON: HCPCS | Performed by: PHYSICIAN ASSISTANT

## 2025-06-18 RX ORDER — OLOPATADINE HYDROCHLORIDE 1 MG/ML
SOLUTION OPHTHALMIC
COMMUNITY

## 2025-06-18 NOTE — NURSING NOTE
Chief Complaint   Patient presents with    Consult     Wants to talk about weight loss medication, wants to see if there is anything he can go on, wants to lose weight has a hard time losing weight, A1c glucose triglycerides has been high and wants to see improvement.     Pre-visit Screening:  Immunizations:  not up to date - tdap, Zoster, rsv, Covid  Colonoscopy:  is due and to be scheduled by patient for later completion  Mammogram: na  Asthma Action Test/Plan:  diamante  PHQ9:  na  GAD7:  na  Questioned patient about current smoking habits Pt. quit smoking some time ago.  Ok to leave detailed message on voice mail for today's visit only yes, phone # [phone] (home)

## 2025-06-18 NOTE — PROGRESS NOTES
Assessment & Plan     Class 1 obesity due to excess calories with serious comorbidity and body mass index (BMI) of 32.0 to 32.9 in adult    - tirzepatide-weight management (ZEPBOUND) 2.5 MG/0.5ML vial  Dispense: 2 mL; Refill: 0    Encounter for weight loss counseling-discussed risk vs benefits and side effects of medication and patient wishes to proceed with prescription therapy   Monitor for any side effects, contact me if this occurs  MyChart in 3-4 weeks for next dose, 3-4 weeks after this, needs OV (9/25)  - tirzepatide-weight management (ZEPBOUND) 2.5 MG/0.5ML vial  Dispense: 2 mL; Refill: 0        Follow up 3 months OV    Subjective   Miguel is a 74 year old, presenting for the following health issues:  Consult (Wants to talk about weight loss medication, wants to see if there is anything he can go on, wants to lose weight has a hard time losing weight, A1c glucose triglycerides has been high and wants to see improvement.)    HPI      Pt presents for consult on weight loss. Weight today: 255lbs. Goal weight: 220lbs. Generally active lifestyle. Has struggled with weight for last 12 years since he retired. Generally eats well, weight continues to increase. Interested in Zepbound, willing to pay OOP due to now insurance coverage.     No history of MEN, MEN2 in himself or family.       Review of Systems  Constitutional, neuro, ENT, endocrine, pulmonary, cardiac, gastrointestinal, genitourinary, musculoskeletal, integument and psychiatric systems are negative, except as otherwise noted.      Objective    /80 (BP Location: Left arm, Patient Position: Sitting, Cuff Size: Adult Large)   Pulse 64   Wt 115.9 kg (255 lb 9.6 oz)   SpO2 95%   BMI 32.38 kg/m    Body mass index is 32.38 kg/m .  Physical Exam   GENERAL: alert and no distress  NECK: no adenopathy, no asymmetry, masses, or scars  RESP: lungs clear to auscultation - no rales, rhonchi or wheezes  CV: regular rate and rhythm, normal S1 S2, no S3 or S4, no  murmur, click or rub, no peripheral edema  ABDOMEN: soft, nontender, no hepatosplenomegaly, no masses and bowel sounds normal  MS: no gross musculoskeletal defects noted, no edema  NEURO: Normal strength and tone, mentation intact and speech normal  PSYCH: mentation appears normal, affect normal/bright            Signed Electronically by: Clem Diego PA-C

## 2025-07-06 ENCOUNTER — MYC MEDICAL ADVICE (OUTPATIENT)
Dept: FAMILY MEDICINE | Facility: CLINIC | Age: 75
End: 2025-07-06

## 2025-07-06 DIAGNOSIS — E66.811 CLASS 1 OBESITY DUE TO EXCESS CALORIES WITH SERIOUS COMORBIDITY AND BODY MASS INDEX (BMI) OF 32.0 TO 32.9 IN ADULT: ICD-10-CM

## 2025-07-06 DIAGNOSIS — Z71.3 ENCOUNTER FOR WEIGHT LOSS COUNSELING: ICD-10-CM

## 2025-07-06 DIAGNOSIS — E66.09 CLASS 1 OBESITY DUE TO EXCESS CALORIES WITH SERIOUS COMORBIDITY AND BODY MASS INDEX (BMI) OF 32.0 TO 32.9 IN ADULT: ICD-10-CM

## 2025-07-07 NOTE — TELEPHONE ENCOUNTER
Please review pt's MYChart message.    [facility] is requesting a refill of:    Pending Prescriptions:                       Disp   Refills    tirzepatide-weight management (ZEPBOUND) *2 mL   0            Sig: Inject 0.5 mLs (5 mg) subcutaneously once a week.

## 2025-07-09 RX ORDER — MECOBALAMIN 5000 MCG
15 TABLET,DISINTEGRATING ORAL DAILY
COMMUNITY
Start: 2025-07-09

## 2025-07-09 NOTE — TELEPHONE ENCOUNTER
R[facility] Mosqueda is requesting a refill of:    Refused Prescriptions:                       Disp   Refills    LANsoprazole (PREVACID) 15 MG DR capsule [*                Sig: TAKE 1 CAPSULE BY MOUTH DAILY.  Refused By: REX FERNANDEZ  Reason for Refusal: Duplicate    Sent 5/22/25 for 1 year

## 2025-07-20 ENCOUNTER — MYC REFILL (OUTPATIENT)
Dept: FAMILY MEDICINE | Facility: CLINIC | Age: 75
End: 2025-07-20

## 2025-07-20 DIAGNOSIS — K21.9 GASTROESOPHAGEAL REFLUX DISEASE WITHOUT ESOPHAGITIS: ICD-10-CM

## 2025-07-20 DIAGNOSIS — R09.81 NASAL CONGESTION: ICD-10-CM

## 2025-07-20 DIAGNOSIS — E03.9 ACQUIRED HYPOTHYROIDISM: ICD-10-CM

## 2025-07-21 RX ORDER — MECOBALAMIN 5000 MCG
15 TABLET,DISINTEGRATING ORAL DAILY
COMMUNITY
Start: 2025-07-21

## 2025-07-21 RX ORDER — LEVOTHYROXINE SODIUM 112 UG/1
TABLET ORAL
COMMUNITY
Start: 2025-07-21

## 2025-07-21 RX ORDER — MOMETASONE FUROATE MONOHYDRATE 50 UG/1
2 SPRAY, METERED NASAL DAILY
COMMUNITY
Start: 2025-07-21

## 2025-07-21 NOTE — TELEPHONE ENCOUNTER
Refused Prescriptions:                       Disp   Refills    LANsoprazole (PREVACID) 15 MG DR capsule                   Sig: Take 1 capsule (15 mg) by mouth daily.  Refused By: DENISSE SHELBY  Reason for Refusal: Should already have refills on file    levothyroxine (SYNTHROID/LEVOTHROID) 112 M*                Sig: TAKE 1 TABLET(112 MCG) BY MOUTH DAILY  Refused By: DENISSE SHELBY  Reason for Refusal: Should already have refills on file    mometasone (NASONEX) 50 MCG/ACT nasal spray                Sig: Spray 2 sprays into both nostrils daily.  Refused By: DENISSE SHELBY  Reason for Refusal: Should already have refills on file    All meds were sent in for a year  Denisse     98

## 2025-07-28 DIAGNOSIS — E66.09 CLASS 1 OBESITY DUE TO EXCESS CALORIES WITH SERIOUS COMORBIDITY AND BODY MASS INDEX (BMI) OF 32.0 TO 32.9 IN ADULT: ICD-10-CM

## 2025-07-28 DIAGNOSIS — E66.811 CLASS 1 OBESITY DUE TO EXCESS CALORIES WITH SERIOUS COMORBIDITY AND BODY MASS INDEX (BMI) OF 32.0 TO 32.9 IN ADULT: ICD-10-CM

## 2025-07-28 DIAGNOSIS — Z71.3 ENCOUNTER FOR WEIGHT LOSS COUNSELING: ICD-10-CM

## 2025-07-30 RX ORDER — TIRZEPATIDE 5 MG/.5ML
INJECTION, SOLUTION SUBCUTANEOUS
Qty: 2 ML | Refills: 0 | Status: SHIPPED | OUTPATIENT
Start: 2025-07-30

## 2025-08-28 ENCOUNTER — TELEPHONE (OUTPATIENT)
Dept: FAMILY MEDICINE | Facility: CLINIC | Age: 75
End: 2025-08-28

## 2025-09-02 ENCOUNTER — TRANSFERRED RECORDS (OUTPATIENT)
Dept: FAMILY MEDICINE | Facility: CLINIC | Age: 75
End: 2025-09-02

## (undated) DEVICE — SUCTION MANIFOLD NEPTUNE 2 SYS 4 PORT 0702-020-000

## (undated) DEVICE — TOURNIQUET CUFF 30" REPRO BLUE 60-7070-105

## (undated) DEVICE — BLADE SAW SAGITTAL STRK 25X90X1.27MM HD SYS 6 6125-127-090

## (undated) DEVICE — GLOVE PROTEXIS BLUE W/NEU-THERA 7.0  2D73EB70

## (undated) DEVICE — SOL WATER IRRIG 1000ML BOTTLE 2F7114

## (undated) DEVICE — CATH TRAY FOLEY COUDE SURESTEP 16FR W/DRN BAG LATEX A304416A

## (undated) DEVICE — CAST PADDING 6" STERILE 9046S

## (undated) DEVICE — LINEN ORTHO ACL PACK 5447

## (undated) DEVICE — GLOVE PROTEXIS W/NEU-THERA 8.5  2D73TE85

## (undated) DEVICE — SU ETHIBOND 0 CT-1 CR 8X18" CX21D

## (undated) DEVICE — PACK TOTAL KNEE BOXED LATEX FREE PO15TKFCT

## (undated) DEVICE — GLOVE PROTEXIS BLUE W/NEU-THERA 8.5  2D73EB85

## (undated) DEVICE — GLOVE PROTEXIS W/NEU-THERA 7.0  2D73TE70

## (undated) DEVICE — SU VICRYL 2-0 CT-1 27" UND J259H

## (undated) DEVICE — LINEN FULL SHEET 5511

## (undated) DEVICE — DRSG AQUACEL AG 3.5X9.75" HYDROFIBER 412011

## (undated) DEVICE — NDL BLUNT 18GA 1" W/O FILTER 305181

## (undated) DEVICE — BONE CEMENT MIXEVAC III HI VAC KIT  0206-015-000

## (undated) DEVICE — DRAIN HEMOVAC RESERVOIR KIT 10FR 1/8" MED 00-2550-002-10

## (undated) DEVICE — GLOVE PROTEXIS POWDER FREE 8.5 ORTHOPEDIC 2D73ET85

## (undated) DEVICE — SU VICRYL 1 CT-1 27" J341H

## (undated) DEVICE — SET HANDPIECE INTERPULSE W/COAXIAL FAN SPRAY TIP 0210118000

## (undated) DEVICE — BAG CLEAR TRASH 1.3M 39X33" P4040C

## (undated) DEVICE — GLOVE PROTEXIS POWDER FREE 7.0 ORTHOPEDIC 2D73ET70

## (undated) DEVICE — BLADE SAW SAGITTAL STRK 13X90X1.27MM HD SYS 6 6113-127-090

## (undated) DEVICE — PREP CHLORAPREP 26ML TINTED ORANGE  260815

## (undated) DEVICE — SYR 03ML LL W/O NDL 309657

## (undated) RX ORDER — CELECOXIB 200 MG/1
CAPSULE ORAL
Status: DISPENSED
Start: 2019-08-26

## (undated) RX ORDER — GLYCOPYRROLATE 0.2 MG/ML
INJECTION INTRAMUSCULAR; INTRAVENOUS
Status: DISPENSED
Start: 2019-08-26

## (undated) RX ORDER — LIDOCAINE HYDROCHLORIDE 10 MG/ML
INJECTION, SOLUTION EPIDURAL; INFILTRATION; INTRACAUDAL; PERINEURAL
Status: DISPENSED
Start: 2018-10-08

## (undated) RX ORDER — FENTANYL CITRATE 50 UG/ML
INJECTION, SOLUTION INTRAMUSCULAR; INTRAVENOUS
Status: DISPENSED
Start: 2018-10-08

## (undated) RX ORDER — ONDANSETRON 2 MG/ML
INJECTION INTRAMUSCULAR; INTRAVENOUS
Status: DISPENSED
Start: 2019-08-26

## (undated) RX ORDER — FENTANYL CITRATE 50 UG/ML
INJECTION, SOLUTION INTRAMUSCULAR; INTRAVENOUS
Status: DISPENSED
Start: 2019-08-26

## (undated) RX ORDER — CELECOXIB 200 MG/1
CAPSULE ORAL
Status: DISPENSED
Start: 2018-10-08

## (undated) RX ORDER — PROPOFOL 10 MG/ML
INJECTION, EMULSION INTRAVENOUS
Status: DISPENSED
Start: 2018-10-08

## (undated) RX ORDER — NEOSTIGMINE METHYLSULFATE 1 MG/ML
VIAL (ML) INJECTION
Status: DISPENSED
Start: 2019-08-26

## (undated) RX ORDER — BUPIVACAINE HYDROCHLORIDE AND EPINEPHRINE 2.5; 5 MG/ML; UG/ML
INJECTION, SOLUTION EPIDURAL; INFILTRATION; INTRACAUDAL; PERINEURAL
Status: DISPENSED
Start: 2018-10-08

## (undated) RX ORDER — PROPOFOL 10 MG/ML
INJECTION, EMULSION INTRAVENOUS
Status: DISPENSED
Start: 2019-08-26

## (undated) RX ORDER — ALBUTEROL SULFATE 90 UG/1
AEROSOL, METERED RESPIRATORY (INHALATION)
Status: DISPENSED
Start: 2019-08-26

## (undated) RX ORDER — GABAPENTIN 600 MG/1
TABLET ORAL
Status: DISPENSED
Start: 2019-08-26

## (undated) RX ORDER — ONDANSETRON 2 MG/ML
INJECTION INTRAMUSCULAR; INTRAVENOUS
Status: DISPENSED
Start: 2018-10-08

## (undated) RX ORDER — ACETAMINOPHEN 325 MG/1
TABLET ORAL
Status: DISPENSED
Start: 2019-08-26

## (undated) RX ORDER — BUPIVACAINE HYDROCHLORIDE AND EPINEPHRINE 2.5; 5 MG/ML; UG/ML
INJECTION, SOLUTION EPIDURAL; INFILTRATION; INTRACAUDAL; PERINEURAL
Status: DISPENSED
Start: 2019-08-26

## (undated) RX ORDER — DEXAMETHASONE SODIUM PHOSPHATE 4 MG/ML
INJECTION, SOLUTION INTRA-ARTICULAR; INTRALESIONAL; INTRAMUSCULAR; INTRAVENOUS; SOFT TISSUE
Status: DISPENSED
Start: 2018-10-08

## (undated) RX ORDER — LIDOCAINE HYDROCHLORIDE 10 MG/ML
INJECTION, SOLUTION EPIDURAL; INFILTRATION; INTRACAUDAL; PERINEURAL
Status: DISPENSED
Start: 2019-08-26

## (undated) RX ORDER — CEFAZOLIN SODIUM 2 G/100ML
INJECTION, SOLUTION INTRAVENOUS
Status: DISPENSED
Start: 2018-10-08

## (undated) RX ORDER — CEFAZOLIN SODIUM 2 G/100ML
INJECTION, SOLUTION INTRAVENOUS
Status: DISPENSED
Start: 2019-08-26

## (undated) RX ORDER — DEXAMETHASONE SODIUM PHOSPHATE 4 MG/ML
INJECTION, SOLUTION INTRA-ARTICULAR; INTRALESIONAL; INTRAMUSCULAR; INTRAVENOUS; SOFT TISSUE
Status: DISPENSED
Start: 2019-08-26

## (undated) RX ORDER — KETOROLAC TROMETHAMINE 30 MG/ML
INJECTION, SOLUTION INTRAMUSCULAR; INTRAVENOUS
Status: DISPENSED
Start: 2018-10-08

## (undated) RX ORDER — HYDROMORPHONE HYDROCHLORIDE 1 MG/ML
INJECTION, SOLUTION INTRAMUSCULAR; INTRAVENOUS; SUBCUTANEOUS
Status: DISPENSED
Start: 2019-08-26

## (undated) RX ORDER — PHENYLEPHRINE HCL IN 0.9% NACL 1 MG/10 ML
SYRINGE (ML) INTRAVENOUS
Status: DISPENSED
Start: 2019-08-26

## (undated) RX ORDER — OXYCODONE HCL 10 MG/1
TABLET, FILM COATED, EXTENDED RELEASE ORAL
Status: DISPENSED
Start: 2018-10-08

## (undated) RX ORDER — KETOROLAC TROMETHAMINE 30 MG/ML
INJECTION, SOLUTION INTRAMUSCULAR; INTRAVENOUS
Status: DISPENSED
Start: 2019-08-26

## (undated) RX ORDER — TRIAMCINOLONE ACETONIDE 40 MG/ML
INJECTION, SUSPENSION INTRA-ARTICULAR; INTRAMUSCULAR
Status: DISPENSED
Start: 2018-10-08

## (undated) RX ORDER — GLYCOPYRROLATE 0.2 MG/ML
INJECTION INTRAMUSCULAR; INTRAVENOUS
Status: DISPENSED
Start: 2018-10-08